# Patient Record
Sex: FEMALE | Race: WHITE | NOT HISPANIC OR LATINO | Employment: OTHER | URBAN - METROPOLITAN AREA
[De-identification: names, ages, dates, MRNs, and addresses within clinical notes are randomized per-mention and may not be internally consistent; named-entity substitution may affect disease eponyms.]

---

## 2023-04-28 ENCOUNTER — TELEPHONE (OUTPATIENT)
Dept: PRIMARY CARE | Facility: CLINIC | Age: 83
End: 2023-04-28
Payer: MEDICARE

## 2023-04-28 DIAGNOSIS — E03.9 HYPOTHYROIDISM, UNSPECIFIED TYPE: ICD-10-CM

## 2023-04-28 RX ORDER — LEVOTHYROXINE SODIUM 50 UG/1
50 TABLET ORAL DAILY
Qty: 90 TABLET | Refills: 3 | Status: SHIPPED | OUTPATIENT
Start: 2023-04-28 | End: 2024-02-02 | Stop reason: SDUPTHER

## 2023-04-28 RX ORDER — LEVOTHYROXINE SODIUM 50 UG/1
1 TABLET ORAL DAILY
COMMUNITY
Start: 2016-05-16 | End: 2023-04-28 | Stop reason: SDUPTHER

## 2023-04-28 NOTE — TELEPHONE ENCOUNTER
Rx Refill Request Telephone Encounter    Name:  Tania Guerrero  :  [id number]  Medication Name:    Levothyroxine Sodium 50MG Tablet   Specific Pharmacy location:   [facility] - [address]      Date of last appointment: 23   Date of next appointment: 23   Best number to reach patient: [phone]

## 2023-05-23 LAB
RBC, URINE: 9 /HPF (ref 0–5)
SQUAMOUS EPITHELIAL CELLS, URINE: 6 /HPF
WBC, URINE: 3 /HPF (ref 0–5)

## 2023-05-24 LAB — URINE CULTURE: NORMAL

## 2023-06-02 LAB
ALANINE AMINOTRANSFERASE (SGPT) (U/L) IN SER/PLAS: 35 U/L (ref 7–45)
ALBUMIN (G/DL) IN SER/PLAS: 3.9 G/DL (ref 3.4–5)
ALBUMIN (MG/L) IN URINE: 12.8 MG/L
ALBUMIN/CREATININE (UG/MG) IN URINE: 16 UG/MG CRT (ref 0–30)
ALKALINE PHOSPHATASE (U/L) IN SER/PLAS: 74 U/L (ref 33–136)
ANION GAP IN SER/PLAS: 15 MMOL/L (ref 10–20)
ASPARTATE AMINOTRANSFERASE (SGOT) (U/L) IN SER/PLAS: 16 U/L (ref 9–39)
BILIRUBIN TOTAL (MG/DL) IN SER/PLAS: 0.6 MG/DL (ref 0–1.2)
CALCIUM (MG/DL) IN SER/PLAS: 9.3 MG/DL (ref 8.6–10.3)
CARBON DIOXIDE, TOTAL (MMOL/L) IN SER/PLAS: 24 MMOL/L (ref 21–32)
CHLORIDE (MMOL/L) IN SER/PLAS: 104 MMOL/L (ref 98–107)
CHOLESTEROL (MG/DL) IN SER/PLAS: 110 MG/DL (ref 0–199)
CHOLESTEROL IN HDL (MG/DL) IN SER/PLAS: 59.7 MG/DL
CHOLESTEROL IN LDL (MG/DL) IN SER/PLAS BY DIRECT ASSAY: 54 MG/DL (ref 0–129)
CHOLESTEROL/HDL RATIO: 1.8
CREATININE (MG/DL) IN SER/PLAS: 0.99 MG/DL (ref 0.5–1.05)
CREATININE (MG/DL) IN URINE: 79.8 MG/DL (ref 20–320)
ERYTHROCYTE DISTRIBUTION WIDTH (RATIO) BY AUTOMATED COUNT: 12.9 % (ref 11.5–14.5)
ERYTHROCYTE MEAN CORPUSCULAR HEMOGLOBIN CONCENTRATION (G/DL) BY AUTOMATED: 34.2 G/DL (ref 32–36)
ERYTHROCYTE MEAN CORPUSCULAR VOLUME (FL) BY AUTOMATED COUNT: 95 FL (ref 80–100)
ERYTHROCYTES (10*6/UL) IN BLOOD BY AUTOMATED COUNT: 4.58 X10E12/L (ref 4–5.2)
ESTIMATED AVERAGE GLUCOSE FOR HBA1C: 140 MG/DL
GFR FEMALE: 57 ML/MIN/1.73M2
GLUCOSE (MG/DL) IN SER/PLAS: 112 MG/DL (ref 74–99)
HEMATOCRIT (%) IN BLOOD BY AUTOMATED COUNT: 43.3 % (ref 36–46)
HEMOGLOBIN (G/DL) IN BLOOD: 14.8 G/DL (ref 12–16)
HEMOGLOBIN A1C/HEMOGLOBIN TOTAL IN BLOOD: 6.5 %
LDL: 31 MG/DL (ref 0–99)
LEUKOCYTES (10*3/UL) IN BLOOD BY AUTOMATED COUNT: 7.8 X10E9/L (ref 4.4–11.3)
PLATELETS (10*3/UL) IN BLOOD AUTOMATED COUNT: 317 X10E9/L (ref 150–450)
POTASSIUM (MMOL/L) IN SER/PLAS: 3.5 MMOL/L (ref 3.5–5.3)
PROTEIN TOTAL: 6.8 G/DL (ref 6.4–8.2)
SODIUM (MMOL/L) IN SER/PLAS: 139 MMOL/L (ref 136–145)
THYROTROPIN (MIU/L) IN SER/PLAS BY DETECTION LIMIT <= 0.05 MIU/L: 1.34 MIU/L (ref 0.44–3.98)
TRIGLYCERIDE (MG/DL) IN SER/PLAS: 95 MG/DL (ref 0–149)
UREA NITROGEN (MG/DL) IN SER/PLAS: 19 MG/DL (ref 6–23)
VLDL: 19 MG/DL (ref 0–40)

## 2023-06-08 PROBLEM — I25.10 ASHD (ARTERIOSCLEROTIC HEART DISEASE): Status: ACTIVE | Noted: 2023-06-08

## 2023-06-08 PROBLEM — N18.31 CHRONIC KIDNEY DISEASE, STAGE 3A (MULTI): Status: ACTIVE | Noted: 2023-06-08

## 2023-06-08 PROBLEM — N28.9 LESION OF LEFT NATIVE URETER: Status: RESOLVED | Noted: 2023-06-08 | Resolved: 2023-06-08

## 2023-06-08 PROBLEM — Z00.00 MEDICARE ANNUAL WELLNESS VISIT, SUBSEQUENT: Status: ACTIVE | Noted: 2023-06-08

## 2023-06-08 PROBLEM — H40.053 BILATERAL OCULAR HYPERTENSION: Status: ACTIVE | Noted: 2023-06-08

## 2023-06-08 PROBLEM — E78.5 HYPERLIPIDEMIA: Status: ACTIVE | Noted: 2023-06-08

## 2023-06-08 PROBLEM — I51.9 LEFT VENTRICULAR DYSFUNCTION: Status: ACTIVE | Noted: 2023-06-08

## 2023-06-08 PROBLEM — E66.9 OBESITY: Status: ACTIVE | Noted: 2023-06-08

## 2023-06-08 PROBLEM — J98.4 MIXED RESTRICTIVE AND OBSTRUCTIVE LUNG DISEASE (MULTI): Status: ACTIVE | Noted: 2023-06-08

## 2023-06-08 PROBLEM — E87.6 HYPOKALEMIA: Status: ACTIVE | Noted: 2023-06-08

## 2023-06-08 PROBLEM — N39.3 STRESS INCONTINENCE, FEMALE: Status: ACTIVE | Noted: 2023-06-08

## 2023-06-08 PROBLEM — G56.03 CARPAL TUNNEL SYNDROME, BILATERAL UPPER LIMBS: Status: ACTIVE | Noted: 2023-06-08

## 2023-06-08 PROBLEM — I10 HYPERTENSION: Status: ACTIVE | Noted: 2023-06-08

## 2023-06-08 PROBLEM — I25.2 HISTORY OF MI (MYOCARDIAL INFARCTION): Status: ACTIVE | Noted: 2023-06-08

## 2023-06-08 PROBLEM — J44.9 MIXED RESTRICTIVE AND OBSTRUCTIVE LUNG DISEASE: Status: ACTIVE | Noted: 2023-06-08

## 2023-06-08 PROBLEM — H26.9 CATARACT, LEFT EYE: Status: ACTIVE | Noted: 2023-06-08

## 2023-06-08 PROBLEM — J44.9 COPD (CHRONIC OBSTRUCTIVE PULMONARY DISEASE) (MULTI): Status: ACTIVE | Noted: 2023-06-08

## 2023-06-08 PROBLEM — M17.0 PRIMARY OSTEOARTHRITIS OF BOTH KNEES: Status: ACTIVE | Noted: 2023-06-08

## 2023-06-08 PROBLEM — G45.8 SUBCLAVIAN STEAL SYNDROME: Status: ACTIVE | Noted: 2023-06-08

## 2023-06-08 PROBLEM — R60.0 BILATERAL LEG EDEMA: Status: ACTIVE | Noted: 2023-06-08

## 2023-06-08 PROBLEM — J43.9 MIXED RESTRICTIVE AND OBSTRUCTIVE LUNG DISEASE (MULTI): Status: ACTIVE | Noted: 2023-06-08

## 2023-06-08 PROBLEM — E87.6 HYPOKALEMIA: Status: RESOLVED | Noted: 2023-06-08 | Resolved: 2023-06-08

## 2023-06-08 PROBLEM — Q61.5 MEDULLARY SPONGE KIDNEY: Status: ACTIVE | Noted: 2023-06-08

## 2023-06-08 PROBLEM — E66.9 OBESITY: Status: RESOLVED | Noted: 2023-06-08 | Resolved: 2023-06-08

## 2023-06-08 PROBLEM — N13.30 HYDRONEPHROSIS, LEFT: Status: RESOLVED | Noted: 2023-06-08 | Resolved: 2023-06-08

## 2023-06-08 PROBLEM — N81.9 PROLAPSE OF FEMALE PELVIC ORGANS: Status: ACTIVE | Noted: 2023-06-08

## 2023-06-08 PROBLEM — D18.03 HEPATIC HEMANGIOMA: Status: ACTIVE | Noted: 2023-06-08

## 2023-06-08 PROBLEM — E11.9 DIET-CONTROLLED TYPE 2 DIABETES MELLITUS (MULTI): Status: ACTIVE | Noted: 2023-06-08

## 2023-06-08 PROBLEM — E04.1 THYROID NODULE: Status: ACTIVE | Noted: 2023-06-08

## 2023-06-08 PROBLEM — M81.0 OSTEOPOROSIS: Status: ACTIVE | Noted: 2023-06-08

## 2023-06-08 RX ORDER — BUDESONIDE AND FORMOTEROL FUMARATE DIHYDRATE 160; 4.5 UG/1; UG/1
2 AEROSOL RESPIRATORY (INHALATION) 2 TIMES DAILY
COMMUNITY
Start: 2022-02-18 | End: 2024-02-02 | Stop reason: CLARIF

## 2023-06-08 RX ORDER — ALBUTEROL SULFATE 90 UG/1
1 AEROSOL, METERED RESPIRATORY (INHALATION) EVERY 4 HOURS PRN
COMMUNITY
Start: 2016-10-20

## 2023-06-08 RX ORDER — AMLODIPINE BESYLATE 2.5 MG/1
1 TABLET ORAL DAILY
COMMUNITY
Start: 2022-11-18 | End: 2023-10-27 | Stop reason: SDUPTHER

## 2023-06-08 RX ORDER — PHENYLEPHRINE HCL 10 MG
500 TABLET ORAL DAILY
COMMUNITY

## 2023-06-08 RX ORDER — LATANOPROST 50 UG/ML
1 SOLUTION/ DROPS OPHTHALMIC NIGHTLY
COMMUNITY
Start: 2019-05-11

## 2023-06-08 RX ORDER — METOPROLOL SUCCINATE 100 MG/1
1 TABLET, EXTENDED RELEASE ORAL DAILY
COMMUNITY
Start: 2016-05-16 | End: 2023-11-27 | Stop reason: SDUPTHER

## 2023-06-08 RX ORDER — ASPIRIN 81 MG/1
1 TABLET ORAL DAILY
COMMUNITY
Start: 2016-05-16

## 2023-06-08 RX ORDER — ISOSORBIDE MONONITRATE 30 MG/1
1 TABLET, EXTENDED RELEASE ORAL DAILY
COMMUNITY
Start: 2016-05-16 | End: 2023-06-12 | Stop reason: WASHOUT

## 2023-06-08 RX ORDER — LOSARTAN POTASSIUM AND HYDROCHLOROTHIAZIDE 25; 100 MG/1; MG/1
1 TABLET ORAL DAILY
COMMUNITY
Start: 2017-12-14 | End: 2023-11-27 | Stop reason: SDUPTHER

## 2023-06-08 RX ORDER — SIMVASTATIN 10 MG/1
5 TABLET, FILM COATED ORAL NIGHTLY
COMMUNITY
Start: 2016-05-16 | End: 2023-11-27 | Stop reason: SDUPTHER

## 2023-06-08 RX ORDER — GLUCOSAMINE HCL 500 MG
1 TABLET ORAL DAILY
COMMUNITY
Start: 2016-05-16

## 2023-06-08 RX ORDER — ACETAMINOPHEN 500 MG
1 TABLET ORAL DAILY
COMMUNITY

## 2023-06-08 NOTE — PROGRESS NOTES
Subjective :  Chief Complaint: Tania Guerrero is an 83 y.o. female here for an annual Medicare Wellness visit, annual physical, and follow up labs and chronic conditions.      Patient feels well. No other complaints or concerns.    I have reviewed and reconciled the medication list with the patient today.    Current Outpatient Medications:     albuterol (Ventolin HFA) 90 mcg/actuation inhaler, Inhale 1 puff every 4 hours if needed for wheezing or shortness of breath., Disp: , Rfl:     amLODIPine (Norvasc) 2.5 mg tablet, Take 1 tablet (2.5 mg) by mouth once daily., Disp: , Rfl:     aspirin 81 mg EC tablet, Take 1 tablet (81 mg) by mouth once daily., Disp: , Rfl:     cholecalciferol (Vitamin D-3) 50 mcg (2,000 unit) capsule, Take by mouth., Disp: , Rfl:     Cinnamon 500 mg capsule, Take by mouth., Disp: , Rfl:     fish oil concentrate (Omega-3) 120-180 mg capsule, Take 1 capsule (1 g) by mouth once daily., Disp: , Rfl:     latanoprost (Xalatan) 0.005 % ophthalmic solution, Administer into affected eye(s)., Disp: , Rfl:     levothyroxine (Synthroid, Levoxyl) 50 mcg tablet, Take 1 tablet (50 mcg) by mouth once daily., Disp: 90 tablet, Rfl: 3    losartan-hydrochlorothiazide (Hyzaar) 100-25 mg tablet, Take 1 tablet by mouth once daily., Disp: , Rfl:     metoprolol succinate XL (Toprol-XL) 100 mg 24 hr tablet, Take 1 tablet (100 mg) by mouth once daily., Disp: , Rfl:     simvastatin (Zocor) 10 mg tablet, Take 0.5 tablets (5 mg) by mouth once daily at bedtime., Disp: , Rfl:     Symbicort 160-4.5 mcg/actuation inhaler, Inhale 2 puffs 2 times a day., Disp: , Rfl:     ubidecarenone (coenzyme Q10) 100 mg tablet, Take by mouth., Disp: , Rfl:     The patient's relevant past medical, surgical, family and social history was reviewed in [facility].  All pertinent lab work and results for this visit were reviewed with patient.    Recent Results (from the past 1008 hour(s))   Urine Culture    Collection Time: 05/23/23  4:18 PM    Specimen:  Urine    Unspecified   Result Value Ref Range    Urine Culture NO SIGNIFICANT GROWTH.    Urinalysis Microscopic Only    Collection Time: 05/23/23  4:18 PM   Result Value Ref Range    WBC, Urine 3 0 - 5 /HPF    RBC, Urine 9 (A) 0 - 5 /HPF    Squamous Epithelial Cells, Urine 6 /HPF   CBC    Collection Time: 06/02/23  8:02 AM   Result Value Ref Range    WBC 7.8 4.4 - 11.3 x10E9/L    RBC 4.58 4.00 - 5.20 x10E12/L    Hemoglobin 14.8 12.0 - 16.0 g/dL    Hematocrit 43.3 36.0 - 46.0 %    MCV 95 80 - 100 fL    MCHC 34.2 32.0 - 36.0 g/dL    Platelets 317 150 - 450 x10E9/L    RDW 12.9 11.5 - 14.5 %   Albumin , Urine Random    Collection Time: 06/02/23  8:02 AM   Result Value Ref Range    ALBUMIN (MG/L) IN URINE 12.8 Not Established mg/L    Albumin/Creatine Ratio 16.0 0.0 - 30.0 ug/mg crt    Creatinine, Urine 79.8 20.0 - 320.0 mg/dL   Comprehensive Metabolic Panel    Collection Time: 06/02/23  8:02 AM   Result Value Ref Range    Glucose 112 (H) 74 - 99 mg/dL    Sodium 139 136 - 145 mmol/L    Potassium 3.5 3.5 - 5.3 mmol/L    Chloride 104 98 - 107 mmol/L    Bicarbonate 24 21 - 32 mmol/L    Anion Gap 15 10 - 20 mmol/L    Urea Nitrogen 19 6 - 23 mg/dL    Creatinine 0.99 0.50 - 1.05 mg/dL    GFR Female 57 (A) >90 mL/min/1.73m2    Calcium 9.3 8.6 - 10.3 mg/dL    Albumin 3.9 3.4 - 5.0 g/dL    Alkaline Phosphatase 74 33 - 136 U/L    Total Protein 6.8 6.4 - 8.2 g/dL    AST 16 9 - 39 U/L    Total Bilirubin 0.6 0.0 - 1.2 mg/dL    ALT (SGPT) 35 7 - 45 U/L   Hemoglobin A1C    Collection Time: 06/02/23  8:02 AM   Result Value Ref Range    Hemoglobin A1C 6.5 (A) %    Estimated Average Glucose 140 MG/DL   Cholesterol, LDL Direct    Collection Time: 06/02/23  8:02 AM   Result Value Ref Range    LDL Direct 54 0 - 129 mg/dL   Lipid Panel    Collection Time: 06/02/23  8:02 AM   Result Value Ref Range    Cholesterol 110 0 - 199 mg/dL    HDL 59.7 mg/dL    Cholesterol/HDL Ratio 1.8     LDL 31 0 - 99 mg/dL    VLDL 19 0 - 40 mg/dL    Triglycerides 95  "0 - 149 mg/dL   TSH with reflex to Free T4 if abnormal    Collection Time: 06/02/23  8:02 AM   Result Value Ref Range    TSH 1.34 0.44 - 3.98 mIU/L         Review of Systems   A complete review of systems was performed and all systems were normal except what is noted in the HPI.      List of current healthcare providers:  Patient Care Team:  Sue Murray MD as PCP - General  Sue Murray MD as PCP - [employer] PCP        Over the past 2 weeks, how often have you been bothered by any of the following problems?  Little interest or pleasure in doing things: Not at all  Feeling down, depressed, or hopeless: Not at all  Patient Health Questionnaire-2 Score: 0             Advance Care Planning:    Living Will: Yes  POA: Yes    Objective :  /83   Pulse 75   Temp 36.5 °C (97.7 °F)   Ht 1.575 m (5' 2\")   Wt 78.3 kg (172 lb 9.6 oz)   SpO2 95%   BMI 31.57 kg/m²    No results found.  Physical Exam  Constitutional:       Appearance: Normal appearance. She is obese.   HENT:      Head: Normocephalic and atraumatic.   Neck:      Vascular: No carotid bruit.   Cardiovascular:      Rate and Rhythm: Normal rate and regular rhythm.      Heart sounds: Normal heart sounds.   Pulmonary:      Effort: Pulmonary effort is normal.      Breath sounds: Normal breath sounds. No wheezing, rhonchi or rales.   Abdominal:      General: Abdomen is flat. Bowel sounds are normal.      Palpations: Abdomen is soft.      Tenderness: There is no abdominal tenderness. There is no guarding.   Musculoskeletal:         General: Normal range of motion.      Right lower leg: No edema.      Left lower leg: No edema.   Skin:     General: Skin is dry.   Neurological:      General: No focal deficit present.      Mental Status: She is alert and oriented to person, place, and time.   Psychiatric:         Mood and Affect: Mood normal.         Behavior: Behavior normal.         Thought Content: Thought content normal. "         Assessment/Plan :  Problem List Items Addressed This Visit       Hypothyroid     Well controlled continue current medication. Reevaluate in 6 months.            Relevant Orders    TSH with reflex to Free T4 if abnormal    Chronic kidney disease, stage 3a     Stable, no proteinuria  Continue to monitor   Recheck 6 months         Relevant Orders    Comprehensive Metabolic Panel    Diet-controlled type 2 diabetes mellitus (CMS/HCC)     well controlled continue work on diet  Reevaluate in 6 months.           Relevant Orders    Comprehensive Metabolic Panel    Hemoglobin A1c    Hyperlipidemia     Well controlled continue current medication. Reevaluate in 6 months.          Relevant Orders    Cholesterol, LDL Direct    Lipid Panel    Comprehensive Metabolic Panel    Hypertension     Well controlled continue current medication. Reevaluate in 6 months.          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  COVID vaccination and shingrix recommended   Prevnar 13 3/16  Pneumovax 23 10/14  Tetanus 10/14  Mammogram 2/22  DEXA 2/22          Other Visit Diagnoses       Annual physical exam        Yearly physical done               The following health maintenance schedule was reviewed with the patient and provided in printed form in the after visit summary:  Health Maintenance   Topic Date Due    COVID-19 Vaccine (1) Never done    Diabetes: Foot Exam  Never done    Diabetes: Retinopathy Screening  Never done    Hepatitis A Vaccines (1 of 2 - Risk 2-dose series) Never done    Hepatitis B Vaccines (1 of 3 - Risk 3-dose series) Never done    Zoster Vaccines (2 of 3) 12/13/2018    Diabetes: Hemoglobin A1C  09/02/2023    Bone Density Scan  02/02/2024    TSH Level  06/02/2024    Lipid Panel  06/02/2024    Diabetes:  Urine Protein Screening  06/02/2024    Medicare Annual Wellness Visit (AWV)  06/13/2024    DTaP/Tdap/Td Vaccines (2 - Td or Tdap) 10/23/2024    Influenza Vaccine  Completed    Pneumococcal Vaccine: 65+ Years  Completed    HIB Vaccines  Aged Out    IPV Vaccines  Aged Out    Meningococcal Vaccine  Aged Out    Rotavirus Vaccines  Aged Out    HPV Vaccines  Aged Out           Patient understands and agrees with treatment plan.          Sue Murray MD

## 2023-06-08 NOTE — ASSESSMENT & PLAN NOTE
Medical Wellness exam done.  COVID vaccination and shingrix recommended   Prevnar 13 3/16  Pneumovax 23 10/14  Tetanus 10/14  Mammogram 2/22  DEXA 2/22

## 2023-06-08 NOTE — ASSESSMENT & PLAN NOTE
>>ASSESSMENT AND PLAN FOR MIXED RESTRICTIVE AND OBSTRUCTIVE LUNG DISEASE (CMS/HCC) WRITTEN ON 6/8/2023  8:30 AM BY EULALIO PICKETT MD    Stable   Continue current medication  Recheck 6 months

## 2023-06-08 NOTE — PATIENT INSTRUCTIONS
I would like you to follow up in 6 months  Please have all labs that were ordered done at least 1 week prior to your visit.      Recommend healthy diet based around fruits, vegetables, and lean proteins such as chicken, turkey, fish, and beans.  Also include moderate portions of healthy carbohydrates such as wheat bread and pasta, sweet potatoes. Limit sweets and alcoholic beverages. Try not drink more than 100 calories in beverages daily.   It is important to get a protein-rich breakfast daily such as oatmeal, eggs or Greek yogurt.  Increase activity as able to a recommended goal of at least 30 minutes of cardiovascular exercise (walking, swimming, biking, jogging etc.) at least 5 days weekly and a goal of 45 minutes or more most days of the week for weight loss. This exercise can be done all at one time or broken up into 2 or more sessions throughout the day.

## 2023-06-12 ENCOUNTER — OFFICE VISIT (OUTPATIENT)
Dept: PRIMARY CARE | Facility: CLINIC | Age: 83
End: 2023-06-12
Payer: MEDICARE

## 2023-06-12 VITALS
TEMPERATURE: 97.7 F | SYSTOLIC BLOOD PRESSURE: 124 MMHG | WEIGHT: 172.6 LBS | HEIGHT: 62 IN | HEART RATE: 75 BPM | OXYGEN SATURATION: 95 % | DIASTOLIC BLOOD PRESSURE: 83 MMHG | BODY MASS INDEX: 31.76 KG/M2

## 2023-06-12 DIAGNOSIS — J43.9 MIXED RESTRICTIVE AND OBSTRUCTIVE LUNG DISEASE (MULTI): ICD-10-CM

## 2023-06-12 DIAGNOSIS — E11.9 DIET-CONTROLLED TYPE 2 DIABETES MELLITUS (MULTI): ICD-10-CM

## 2023-06-12 DIAGNOSIS — Z00.00 MEDICARE ANNUAL WELLNESS VISIT, SUBSEQUENT: Primary | ICD-10-CM

## 2023-06-12 DIAGNOSIS — I10 PRIMARY HYPERTENSION: ICD-10-CM

## 2023-06-12 DIAGNOSIS — E78.2 MIXED HYPERLIPIDEMIA: ICD-10-CM

## 2023-06-12 DIAGNOSIS — N18.31 CHRONIC KIDNEY DISEASE, STAGE 3A (MULTI): ICD-10-CM

## 2023-06-12 DIAGNOSIS — J98.4 MIXED RESTRICTIVE AND OBSTRUCTIVE LUNG DISEASE (MULTI): ICD-10-CM

## 2023-06-12 DIAGNOSIS — E03.9 HYPOTHYROIDISM, UNSPECIFIED TYPE: ICD-10-CM

## 2023-06-12 DIAGNOSIS — Z00.00 ANNUAL PHYSICAL EXAM: ICD-10-CM

## 2023-06-12 PROCEDURE — 1170F FXNL STATUS ASSESSED: CPT | Performed by: FAMILY MEDICINE

## 2023-06-12 PROCEDURE — 1036F TOBACCO NON-USER: CPT | Performed by: FAMILY MEDICINE

## 2023-06-12 PROCEDURE — 99397 PER PM REEVAL EST PAT 65+ YR: CPT | Performed by: FAMILY MEDICINE

## 2023-06-12 PROCEDURE — 1159F MED LIST DOCD IN RCRD: CPT | Performed by: FAMILY MEDICINE

## 2023-06-12 PROCEDURE — 3074F SYST BP LT 130 MM HG: CPT | Performed by: FAMILY MEDICINE

## 2023-06-12 PROCEDURE — 3079F DIAST BP 80-89 MM HG: CPT | Performed by: FAMILY MEDICINE

## 2023-06-12 PROCEDURE — G0439 PPPS, SUBSEQ VISIT: HCPCS | Performed by: FAMILY MEDICINE

## 2023-06-12 PROCEDURE — 99214 OFFICE O/P EST MOD 30 MIN: CPT | Performed by: FAMILY MEDICINE

## 2023-06-12 PROCEDURE — 1160F RVW MEDS BY RX/DR IN RCRD: CPT | Performed by: FAMILY MEDICINE

## 2023-06-12 ASSESSMENT — ACTIVITIES OF DAILY LIVING (ADL)
TAKING_MEDICATION: INDEPENDENT
MANAGING_FINANCES: INDEPENDENT
DOING_HOUSEWORK: INDEPENDENT
DRESSING: INDEPENDENT
BATHING: INDEPENDENT
GROCERY_SHOPPING: INDEPENDENT

## 2023-06-12 ASSESSMENT — PATIENT HEALTH QUESTIONNAIRE - PHQ9
2. FEELING DOWN, DEPRESSED OR HOPELESS: NOT AT ALL
SUM OF ALL RESPONSES TO PHQ9 QUESTIONS 1 AND 2: 0
1. LITTLE INTEREST OR PLEASURE IN DOING THINGS: NOT AT ALL

## 2023-06-12 ASSESSMENT — ENCOUNTER SYMPTOMS
LOSS OF SENSATION IN FEET: 0
DEPRESSION: 0
OCCASIONAL FEELINGS OF UNSTEADINESS: 0

## 2023-09-22 ENCOUNTER — HOSPITAL ENCOUNTER (OUTPATIENT)
Dept: DATA CONVERSION | Facility: HOSPITAL | Age: 83
End: 2023-09-22
Attending: STUDENT IN AN ORGANIZED HEALTH CARE EDUCATION/TRAINING PROGRAM | Admitting: STUDENT IN AN ORGANIZED HEALTH CARE EDUCATION/TRAINING PROGRAM
Payer: MEDICARE

## 2023-09-22 DIAGNOSIS — N81.3 COMPLETE UTEROVAGINAL PROLAPSE: ICD-10-CM

## 2023-09-22 DIAGNOSIS — N39.3 STRESS INCONTINENCE (FEMALE) (MALE): ICD-10-CM

## 2023-09-22 DIAGNOSIS — N81.9 FEMALE GENITAL PROLAPSE, UNSPECIFIED: ICD-10-CM

## 2023-09-26 LAB — URINE CULTURE: NO GROWTH

## 2023-09-29 VITALS — BODY MASS INDEX: 31.64 KG/M2 | HEIGHT: 62 IN | WEIGHT: 171.96 LBS

## 2023-09-30 NOTE — H&P
History of Present Illness:   History Present Illness:  Reason for surgery: POP   HPI:    Ms. Guerrero is an 82yo with stage III POP who desires surgical management.  She presents for LeFort colpocleisis.    UDS: negative for leak, no sling  PMHx notable for: HTN, DM, CDK (baseline Cr 1.1), and MI    POP-Q: Stage: 3. Aa: 0. Ba: 0 C: +1 Gh: 3. Pb: 3 TVL: 8 Ap: 0. Bp: 0. D: +1.     Allergies:        Allergies:  ·  penicillin : Unknown  ·  Levaquin : Unknown  ·  sulfa  drugs: Unknown    Home Medication Review:   Home Medications Reviewed: no     Impression/Procedure:   ·  Impression and Planned Procedure: LeFort Colpocleisis, cystoscopy       ERAS (Enhanced Recovery After Surgery):  ·  ERAS Patient: no       Physical Exam by System:    Constitutional: A&Ox3   Respiratory/Thorax: No increased work of breathing,  comfortable on RA   Cardiovascular: Regular rate and rhythm   Gastrointestinal: Soft, non-tender   Genitourinary: Per HPI   Musculoskeletal: Normal ROM   Neurological: No acute deficits   Skin: No lesions     Consent:   COVID-19 Consent:  ·  COVID-19 Risk Consent Surgeon has reviewed key risks related to the risk of nasreen COVID-19 and if they contract COVID-19 what the risks are.     Attestation:   Note Completion:  I am a:  Resident/Fellow   Attending Attestation I saw and evaluated the patient.  I personally obtained the key and critical portions of the history and physical exam or was physically present for key and  critical portions performed by the resident/fellow. I reviewed the resident/fellow?s documentation and discussed the patient with the resident/fellow.  I agree with the resident/fellow?s medical decision making as documented in the note.     I personally evaluated the patient on 22-Sep-2023         Electronic Signatures:  Lorraine Joyner (Fellow))  (Signed 21-Sep-2023 16:22)   Authored: History of Present Illness, Allergies, Home  Medication Review, Impression/Procedure, ERAS, Physical  Exam, Consent, Note Completion  Reginald Dangelo)  (Signed 22-Sep-2023 07:45)   Authored: Note Completion   Co-Signer: History of Present Illness, Allergies, Home Medication Review, Impression/Procedure, ERAS, Physical Exam, Consent, Note Completion      Last Updated: 22-Sep-2023 07:45 by Reginald Dangelo)

## 2023-10-01 NOTE — OP NOTE
Post Operative Note:     PreOp Diagnosis: genital prolapse   Post-Procedure Diagnosis: same   Procedure: 1. LeFort Colpocleisis  2. Posterior repair   Surgeon: rios   Resident/Fellow/Other Assistant: Ar   Anesthesia: ga   Estimated Blood Loss (mL): 10   Specimen: no   Findings: stage 3 uterovaginal prolapse, normal bladder     Operative Report Dictated:  Dictation: not applicable - note contains Operative  Report   Operative Report:      Patient was taken to the operating room. She was placed under general anesthesia and given 2 g of ancef. She was then placed in dorsal lithotomy, prepped and draped in the usual sterile fashion. An EUA was performed and revealed the findings above. We  then began the procedure by grasping the anterior and posterior lips of the cervix with a single tooth tenaculum x 2. We then scored the anterior and posterior vaginal mucosa with the Bovie to kieran the areas of our dissection leaving to 1.5 cm strips  of mucosa laterally for the tunnels. Then we injected 10 units of vasopressin in 30 ml of NS anteriorly and posterioly to hydrodisect the mucosa off the vagina. We then completed the dissection of the vaginal mucosa with the bovie and sharply with metzenbaum  scissors. Then using 0-vicryl suture we incorporated the vaginal remaining vaginal mucosa to create lateral tunnels for cervical and uterine drainage with single interrupted sutures. This was done bilaterally. Then we created the cervical tunnel incorporating  the anterior and posterior lips of the cervix using the same suture. Then using the same suture we imbricated the uterus and vagina to reduce the prolapse with 3 sequential purse string stitches. Once this was completed we closed the anterior and posterior  vaginal mucosa to complete the procedure with a resulting vaginal length of approximately 3 cm. Once this was completed, the tate was removed, we performed a cystoscopy, the bladder was normal, both UOs and jets  were seen. The cystoscope was removed  and the tate was replaced.    We then turned our attention the the levator myorrhaphy. A wide daniel incision was made in the distal vagina and perineum and the excess epithelium was dissected and discarded after first injecting the area with 1% lidocaine with epinephrine. The perineal  muscles were then dissected off of the epithelium with madrid scissors. The muscles were then plicated using vertical mattress sutures until the genital hiatus was 2 cm wide. We then re-approximated the epithelium with a 0-vicryl stitch.       Electronic Signatures:  Reginald Dangelo)  (Signed 22-Sep-2023 09:13)   Authored: Post Operative Note, Note Completion      Last Updated: 22-Sep-2023 09:13 by Reginald Dangelo)

## 2023-10-27 DIAGNOSIS — I10 PRIMARY HYPERTENSION: Primary | ICD-10-CM

## 2023-10-27 RX ORDER — AMLODIPINE BESYLATE 2.5 MG/1
2.5 TABLET ORAL DAILY
Qty: 90 TABLET | Refills: 3 | Status: SHIPPED | OUTPATIENT
Start: 2023-10-27

## 2023-11-06 PROBLEM — R20.2 PARESTHESIA OF BOTH HANDS: Status: ACTIVE | Noted: 2023-11-06

## 2023-11-06 PROBLEM — J44.9 COPD (CHRONIC OBSTRUCTIVE PULMONARY DISEASE) (MULTI): Status: ACTIVE | Noted: 2023-11-06

## 2023-11-06 RX ORDER — ISOSORBIDE MONONITRATE 30 MG/1
1 TABLET, EXTENDED RELEASE ORAL DAILY
COMMUNITY
Start: 2016-05-16 | End: 2024-02-02 | Stop reason: WASHOUT

## 2023-11-07 ENCOUNTER — TELEMEDICINE (OUTPATIENT)
Dept: UROLOGY | Facility: CLINIC | Age: 83
End: 2023-11-07
Payer: MEDICARE

## 2023-11-07 DIAGNOSIS — R10.2 PELVIC PAIN: ICD-10-CM

## 2023-11-07 DIAGNOSIS — M62.89 PELVIC FLOOR DYSFUNCTION: Primary | ICD-10-CM

## 2023-11-07 DIAGNOSIS — Z09 POSTOP CHECK: ICD-10-CM

## 2023-11-07 PROCEDURE — 99024 POSTOP FOLLOW-UP VISIT: CPT | Performed by: STUDENT IN AN ORGANIZED HEALTH CARE EDUCATION/TRAINING PROGRAM

## 2023-11-07 RX ORDER — ESTRADIOL 0.1 MG/G
CREAM VAGINAL
Qty: 42.5 G | Refills: 12 | Status: SHIPPED | OUTPATIENT
Start: 2023-11-07 | End: 2024-02-02 | Stop reason: WASHOUT

## 2023-11-07 NOTE — PROGRESS NOTES
HISTORY OF PRESENT ILLNESS:  This is a 83 y.o. female, who presents with a virtual 6 weeks follow up visit. Doing well post-op, c/o small yellow spot on pad, unsure if it's urine or discharge.              HISTORY OF PRESENT ILLNESS:           Past Medical History  She has a past medical history of Disorder of kidney and ureter, unspecified (06/03/2021), Diverticulitis of intestine, part unspecified, without perforation or abscess without bleeding, Hydronephrosis, left (06/08/2023), Medullary cystic kidney, Old myocardial infarction, Other conditions influencing health status, Personal history of other diseases of the respiratory system (06/24/2019), Personal history of other infectious and parasitic diseases, Personal history of other specified conditions (06/11/2018), Personal history of urinary calculi, and Unspecified hydronephrosis (05/20/2021).    Surgical History  She has a past surgical history that includes Lithotripsy (05/16/2016); Other surgical history (06/24/2019); Other surgical history (12/02/2019); Other surgical history (12/02/2019); and Other surgical history (12/02/2019).     Social History  She reports that she has never smoked. She has never used smokeless tobacco. She reports that she does not currently use alcohol. She reports that she does not use drugs.    Family History  Family History   Problem Relation Name Age of Onset    No Known Problems Father          Allergies  Levofloxacin, Nickel, Penicillins, and Sulfa (sulfonamide antibiotics)      A comprehensive 10+ review of systems was negative except for: see hpi                         Assessment:    84 yo with stage 3 UVP      Prolapse:  S/p LeFort and OR on 9/22/23, doing well  F/up in 3 months   Given vaginal estrogen for possible discharge             Reginald Dangelo MD

## 2023-11-20 RX ORDER — MAGNESIUM HYDROXIDE 400 MG/5ML
1 SUSPENSION, ORAL (FINAL DOSE FORM) ORAL DAILY
COMMUNITY

## 2023-11-20 NOTE — PROGRESS NOTES
[facility]    Patient Name: Tania Guerrero  Patient : 1940      Scribe Attestation  By signing my name below, IJoellen Scribe   attest that this documentation has been prepared under the direction and in the presence of Rebel Nugent DO.      Reason for visit:  This is an 83-year-old female here for follow-up regarding her history of coronary artery disease as seen on cardiac catheterization done in , hypertension, dyslipidemia, COPD, and obesity.     HPI:  This is an 83-year-old female here for follow-up regarding her history of coronary artery disease as seen on cardiac catheterization done in , hypertension, dyslipidemia, COPD, and obesity.  The patient was last evaluated by me in 2022.  At that visit I started her on amlodipine 2.5 mg daily and asked that she follow-up in 1 year.  CMP done in 2023 showed normal serum sodium and potassium with a serum creatinine 0.97, normal ALT/AST, CBC showed a hemoglobin of 13.8.  Lipid panel done in 2023 showed an LDL cholesterol of 31 and triglycerides of 95 while on simvastatin 10 mg daily.  CT scan of the abdomen/pelvis done in 2023 showed no acute findings with unchanged nonobstructing left inferior pole renal calculi. ECG done today showed sinus rhythm with a heart rate of 73 bpm.  The patient reports that she has been feeling generally well from the cardiac standpoint. She denies any new chest pain, shortness of breath, palpitations and lightheadedness, although she states she has occasional lower extremity edema and wears support stockings. She states that she takes all of her medications as prescribed. During my exam, she was resting comfortably on the exam table.         Assessment/Plan:   1. Coronary artery disease  The patient has a history of history of mild coronary artery disease as seen on cardiac catheterization done in .  ECG done today showed sinus  rhythm with a heart rate of 73 bpm.    She denies anginal chest discomfort.  Blood pressure appears  controlled on exam today.   She should continue her current antihypertensive medications.  Echocardiogram done in May 2022 showed low normal left ventricular systolic function with an ejection fraction of 50 to 55%, low normal right ventricular systolic function, no significant valve abnormalities.   Lipid panel done in June 2023 showed an LDL cholesterol of 31 and triglycerides of 95 while on simvastatin 10 mg daily.   Recent lab works as noted in the HPI.   Lab works as noted below will be done in 6 months prior to his next visit.   Please see lifestyle recommendations below.  Follow up in 1 year and sooner if necessary.      2. Hypertension  The patient has a history of hypertension and blood pressure appears controlled on exam today.   She should continue her current antihypertensive medications.  Continue risk factor modification.     3. Dyslipidemia  Lipid panel done in June 2023 showed an LDL cholesterol of 31 and triglycerides of 95 while on simvastatin 10 mg daily.   I will update lipid panel in 6 months.  Please see lifestyle recommendations below.     4. COPD  Stable. Management as per PCP.     5. Obesity  Please see lifestyle recommendations below.    6. Lower extremity edema  The patient has 1+ pitting bilateral lower extremity edema.  I discussed with her the importance of following a low-sodium heart healthy diet as well as weight loss, wearing compression stockings and elevating legs when seated.        Orders:   CMP/lipid/magnesium/CBC in 6 months,   Follow-up in 1 year.      Lifestyle Recommendations  I recommend a whole-food plant-based diet, an eating pattern that encourages the consumption of unrefined plant foods (such as fruits, vegetables, tubers, whole grains, legumes, nuts and seeds) and discourages meats, dairy products, eggs and processed foods.     The AHA/ACC recommends that the patient  consume a dietary pattern that emphasizes intake of vegetables, fruits, and whole grains; includes low-fat dairy products, poultry, fish, legumes, non-tropical vegetable oils, and nuts; and limits intake of sodium, sweets, sugar-sweetened beverages, and red meats.  Adapt this dietary pattern to appropriate calorie requirements (a 500-750 kcal/day deficit to loose weight), personal and cultural food preferences, and nutrition therapy for other medical conditions (including diabetes).  Achieve this pattern by following plans such as the [place] Mediterranean, DASH dietary pattern, or AHA diet.     Engage in 2 hours and 30 minutes per week of moderate-intensity physical activity, or 1 hour and 15 minutes (75 minutes) per week of vigorous-intensity aerobic physical activity, or an equivalent combination of moderate and vigorous-intensity aerobic physical activity. Aerobic activity should be performed in episodes of at least 10 minutes preferably spread throughout the week.     Adhering to a heart healthy diet, regular exercise habits, avoidance of tobacco products, and maintenance of a healthy weight are crucial components of their heart disease risk reduction.     Any positive review of systems not specifically addressed in the office visit today should be evaluated and treated by the patients primary care physician or in an emergency department if necessary     Patient was notified that results from ordered tests will be called to the patient if it changes current management; it will otherwise be discussed at a future appointment and available on [facility].     Thank you for allowing me to participate in the care of this patient.        This document was generated using the assistance of voice recognition software. If there are any errors of spelling, grammar, syntax, or meaning; please feel free to contact me directly for clarification.    Past Medical History:  She has a past medical history of Disorder of kidney and  ureter, unspecified (06/03/2021), Diverticulitis of intestine, part unspecified, without perforation or abscess without bleeding, Hydronephrosis, left (06/08/2023), Medullary cystic kidney, Old myocardial infarction, Other conditions influencing health status, Personal history of other diseases of the respiratory system (06/24/2019), Personal history of other infectious and parasitic diseases, Personal history of other specified conditions (06/11/2018), Personal history of urinary calculi, and Unspecified hydronephrosis (05/20/2021).    Past Surgical History:  She has a past surgical history that includes Lithotripsy (05/16/2016); Other surgical history (06/24/2019); Other surgical history (12/02/2019); Other surgical history (12/02/2019); and Other surgical history (12/02/2019).      Social History:  She reports that she has never smoked. She has never used smokeless tobacco. She reports that she does not currently use alcohol. She reports that she does not use drugs.    Family History:  Family History   Problem Relation Name Age of Onset    No Known Problems Father          Allergies:  Levofloxacin, Nickel, Penicillins, and Sulfa (sulfonamide antibiotics)    Outpatient Medications:  Current Outpatient Medications   Medication Instructions    acetaminophen (Tylenol) 500 mg tablet TAKE 2 TABLETS BY MOUTH EVERY 6 HOURS    albuterol (Ventolin HFA) 90 mcg/actuation inhaler 1 puff, inhalation, Every 4 hours PRN    amLODIPine (NORVASC) 2.5 mg, oral, Daily    aspirin 81 mg EC tablet 1 tablet, oral, Daily    cholecalciferol (Vitamin D-3) 50 mcg (2,000 unit) capsule oral    Cinnamon 500 mg capsule oral    estradiol (Estrace) 0.01 % (0.1 mg/gram) vaginal cream Insert pea size amount into vagina every night for 2 weeks, then 2x/week after    fish oil concentrate (Omega-3) 120-180 mg capsule 1 capsule, oral, Daily    TEO OIL ORAL 1 capsule, oral, Daily    isosorbide mononitrate ER (Imdur) 30 mg 24 hr tablet 1 tablet, oral,  "Daily    ketorolac (Toradol) 10 mg tablet TAKE 1 TABLET BY MOUTH FOUR TIMES A DAY    latanoprost (Xalatan) 0.005 % ophthalmic solution ophthalmic (eye)    levothyroxine (SYNTHROID, LEVOXYL) 50 mcg, oral, Daily    losartan-hydrochlorothiazide (Hyzaar) 100-25 mg tablet 1 tablet, oral, Daily    metoprolol succinate XL (Toprol-XL) 100 mg 24 hr tablet 1 tablet, oral, Daily    mometasone-formoterol (Dulera 200) 200-5 mcg/actuation inhaler 1 puff, inhalation    potassium gluconate 595 mg (99 mg) tablet oral    simvastatin (ZOCOR) 5 mg, oral, Nightly    Symbicort 160-4.5 mcg/actuation inhaler 2 puffs, inhalation, 2 times daily    traMADol (Ultram) 50 mg tablet TAKE 1 TABLET BY MOUTH FOUR TIMES A DAY    ubidecarenone (coenzyme Q10) 100 mg tablet oral        ROS:  A 14 point review of systems was done and is negative other than as stated in HPI    Vitals:      5/11/2022     1:01 PM 6/7/2022    12:04 PM 11/18/2022    10:50 AM 12/16/2022    10:47 AM 5/23/2023     3:34 PM 6/12/2023    11:12 AM 9/22/2023     6:14 AM   Vitals   Systolic 140 137 162 122 129 124    Diastolic 74 79 102 81 78 83    Heart Rate 76 85 75 77 77 75    Temp  36.9 °C (98.4 °F)  36.6 °C (97.9 °F)  36.5 °C (97.7 °F)    Resp   17       Height (in) 1.575 m (5' 2\") 1.575 m (5' 2\") 1.575 m (5' 2\") 1.575 m (5' 2\")  1.575 m (5' 2\") 1.574 m (5' 1.97\")   Weight (lb) 174.38 174.2 175 174.4  172.6 171.96   BMI 31.89 kg/m2 31.86 kg/m2 32.01 kg/m2 31.9 kg/m2  31.57 kg/m2 31.48 kg/m2   BSA (m2) 1.86 m2 1.86 m2 1.86 m2 1.86 m2  1.85 m2 1.85 m2        Physical Exam:   Constitutional: Cooperative, in no acute distress, alert, appears stated age.   Skin: Skin color, texture, turgor normal. No rashes or lesions.   Head: Normocephalic. No masses, lesions, tenderness or abnormalities   Eyes: Extraocular movements are grossly intact.   Mouth and throat: Mucous membranes moist   Neck: Neck supple, no carotid bruits, no JVD   Respiratory: Lungs clear to auscultation, no wheezing or " rhonchi, no use of accessory muscles   Chest wall: No scars, normal excursion with respiration   Cardiovascular: Regular rhythm without murmur, gallop, or rubs   Gastrointestinal: Abdomen soft, nontender. Bowel sounds normal. Obese.  Musculoskeletal: Strength equal in upper extremities   Extremities: 1+ pitting edema   Neurologic: Sensation grossly intact, alert and oriented x3.    Intake/Output:   No intake/output data recorded.    Outpatient Medications  Current Outpatient Medications on File Prior to Visit   Medication Sig Dispense Refill    acetaminophen (Tylenol) 500 mg tablet TAKE 2 TABLETS BY MOUTH EVERY 6 HOURS 56 tablet 0    albuterol (Ventolin HFA) 90 mcg/actuation inhaler Inhale 1 puff every 4 hours if needed for wheezing or shortness of breath.      amLODIPine (Norvasc) 2.5 mg tablet Take 1 tablet (2.5 mg) by mouth once daily. 90 tablet 3    aspirin 81 mg EC tablet Take 1 tablet (81 mg) by mouth once daily.      cholecalciferol (Vitamin D-3) 50 mcg (2,000 unit) capsule Take by mouth.      Cinnamon 500 mg capsule Take by mouth.      fish oil concentrate (Omega-3) 120-180 mg capsule Take 1 capsule (1 g) by mouth once daily.      TEO OIL ORAL Take 1 capsule by mouth once daily.      latanoprost (Xalatan) 0.005 % ophthalmic solution Administer into affected eye(s).      levothyroxine (Synthroid, Levoxyl) 50 mcg tablet Take 1 tablet (50 mcg) by mouth once daily. 90 tablet 3    losartan-hydrochlorothiazide (Hyzaar) 100-25 mg tablet Take 1 tablet by mouth once daily.      metoprolol succinate XL (Toprol-XL) 100 mg 24 hr tablet Take 1 tablet (100 mg) by mouth once daily.      potassium gluconate 595 mg (99 mg) tablet Take by mouth.      simvastatin (Zocor) 10 mg tablet Take 0.5 tablets (5 mg) by mouth once daily at bedtime.      Symbicort 160-4.5 mcg/actuation inhaler Inhale 2 puffs 2 times a day.      ubidecarenone (coenzyme Q10) 100 mg tablet Take by mouth.      estradiol (Estrace) 0.01 % (0.1 mg/gram)  vaginal cream Insert pea size amount into vagina every night for 2 weeks, then 2x/week after 42.5 g 12    isosorbide mononitrate ER (Imdur) 30 mg 24 hr tablet Take 1 tablet (30 mg) by mouth once daily.      ketorolac (Toradol) 10 mg tablet TAKE 1 TABLET BY MOUTH FOUR TIMES A DAY 20 tablet 0    mometasone-formoterol (Dulera 200) 200-5 mcg/actuation inhaler Inhale 1 puff.      traMADol (Ultram) 50 mg tablet TAKE 1 TABLET BY MOUTH FOUR TIMES A DAY 10 tablet 0     No current facility-administered medications on file prior to visit.       Labs: (past 26 weeks)  Recent Results (from the past 4368 hour(s))   Urine Culture    Collection Time: 05/23/23  4:18 PM    Specimen: Urine    Unspecified   Result Value Ref Range    Urine Culture NO SIGNIFICANT GROWTH.    Urinalysis Microscopic Only    Collection Time: 05/23/23  4:18 PM   Result Value Ref Range    WBC, Urine 3 0 - 5 /HPF    RBC, Urine 9 (A) 0 - 5 /HPF    Squamous Epithelial Cells, Urine 6 /HPF   CBC    Collection Time: 06/02/23  8:02 AM   Result Value Ref Range    WBC 7.8 4.4 - 11.3 x10E9/L    RBC 4.58 4.00 - 5.20 x10E12/L    Hemoglobin 14.8 12.0 - 16.0 g/dL    Hematocrit 43.3 36.0 - 46.0 %    MCV 95 80 - 100 fL    MCHC 34.2 32.0 - 36.0 g/dL    Platelets 317 150 - 450 x10E9/L    RDW 12.9 11.5 - 14.5 %   Albumin , Urine Random    Collection Time: 06/02/23  8:02 AM   Result Value Ref Range    ALBUMIN (MG/L) IN URINE 12.8 Not Established mg/L    Albumin/Creatine Ratio 16.0 0.0 - 30.0 ug/mg crt    Creatinine, Urine 79.8 20.0 - 320.0 mg/dL   Comprehensive Metabolic Panel    Collection Time: 06/02/23  8:02 AM   Result Value Ref Range    Glucose 112 (H) 74 - 99 mg/dL    Sodium 139 136 - 145 mmol/L    Potassium 3.5 3.5 - 5.3 mmol/L    Chloride 104 98 - 107 mmol/L    Bicarbonate 24 21 - 32 mmol/L    Anion Gap 15 10 - 20 mmol/L    Urea Nitrogen 19 6 - 23 mg/dL    Creatinine 0.99 0.50 - 1.05 mg/dL    GFR Female 57 (A) >90 mL/min/1.73m2    Calcium 9.3 8.6 - 10.3 mg/dL    Albumin  3.9 3.4 - 5.0 g/dL    Alkaline Phosphatase 74 33 - 136 U/L    Total Protein 6.8 6.4 - 8.2 g/dL    AST 16 9 - 39 U/L    Total Bilirubin 0.6 0.0 - 1.2 mg/dL    ALT (SGPT) 35 7 - 45 U/L   Hemoglobin A1C    Collection Time: 06/02/23  8:02 AM   Result Value Ref Range    Hemoglobin A1C 6.5 (A) %    Estimated Average Glucose 140 MG/DL   Cholesterol, LDL Direct    Collection Time: 06/02/23  8:02 AM   Result Value Ref Range    LDL Direct 54 0 - 129 mg/dL   Lipid Panel    Collection Time: 06/02/23  8:02 AM   Result Value Ref Range    Cholesterol 110 0 - 199 mg/dL    HDL 59.7 mg/dL    Cholesterol/HDL Ratio 1.8     LDL 31 0 - 99 mg/dL    VLDL 19 0 - 40 mg/dL    Triglycerides 95 0 - 149 mg/dL   TSH with reflex to Free T4 if abnormal    Collection Time: 06/02/23  8:02 AM   Result Value Ref Range    TSH 1.34 0.44 - 3.98 mIU/L   Basic Metabolic Panel    Collection Time: 09/19/23  2:01 PM   Result Value Ref Range    Glucose 104 (H) 74 - 99 mg/dL    Sodium 140 136 - 145 mmol/L    Potassium 3.4 (L) 3.5 - 5.3 mmol/L    Chloride 106 98 - 107 mmol/L    Bicarbonate 26 21 - 32 mmol/L    Anion Gap 11 10 - 20 mmol/L    Urea Nitrogen 20 6 - 23 mg/dL    Creatinine 1.09 (H) 0.50 - 1.05 mg/dL    GFR Female 51 (A) >90 mL/min/1.73m2    Calcium 9.1 8.6 - 10.3 mg/dL   CBC    Collection Time: 09/19/23  2:01 PM   Result Value Ref Range    WBC 8.1 4.4 - 11.3 x10E9/L    RBC 4.64 4.00 - 5.20 x10E12/L    Hemoglobin 14.6 12.0 - 16.0 g/dL    Hematocrit 43.5 36.0 - 46.0 %    MCV 94 80 - 100 fL    MCHC 33.6 32.0 - 36.0 g/dL    Platelets 315 150 - 450 x10E9/L    RDW 12.9 11.5 - 14.5 %   Electrocardiogram 12 Lead    Collection Time: 09/19/23  2:32 PM   Result Value Ref Range    Ventricular Rate 79 BPM    Atrial Rate 79 BPM    FL Interval 154 ms    QRS Duration 87 ms    QT Interval 426 ms    QTC Calculation(Bazett) 489 ms    P Axis 28 degrees    R Axis -47 degrees    T Axis -2 degrees    QRS Count 12 beats    Q Onset 249 ms    T Offset 462 ms    QTC Fredericia  467 ms   Urine Culture    Collection Time: 09/25/23  3:12 PM    Specimen: Urine    Clean Catch/Voided   Result Value Ref Range    Urine Culture NO GROWTH    CBC and Auto Differential    Collection Time: 09/27/23  3:07 AM   Result Value Ref Range    WBC 9.9 4.4 - 11.3 x10E9/L    RBC 4.32 4.00 - 5.20 x10E12/L    Hemoglobin 13.8 12.0 - 16.0 g/dL    Hematocrit 40.9 36.0 - 46.0 %    MCV 95 80 - 100 fL    MCHC 33.7 32.0 - 36.0 g/dL    Platelets 309 150 - 450 x10E9/L    RDW 13.0 11.5 - 14.5 %    Neutrophils % 70.7 40.0 - 80.0 %    Immature Granulocytes %, Automated 0.3 0.0 - 0.9 %    Lymphocytes % 19.1 13.0 - 44.0 %    Monocytes % 7.4 2.0 - 10.0 %    Eosinophils % 1.9 0.0 - 6.0 %    Basophils % 0.6 0.0 - 2.0 %    Neutrophils Absolute 7.00 (H) 1.60 - 5.50 x10E9/L    Lymphocytes Absolute 1.89 0.80 - 3.00 x10E9/L    Monocytes Absolute 0.73 0.05 - 0.80 x10E9/L    Eosinophils Absolute 0.19 0.00 - 0.40 x10E9/L    Basophils Absolute 0.06 0.00 - 0.10 x10E9/L   Comprehensive Metabolic Panel    Collection Time: 09/27/23  3:07 AM   Result Value Ref Range    Glucose 131 (H) 74 - 99 mg/dL    Sodium 138 136 - 145 mmol/L    Potassium 3.6 3.5 - 5.3 mmol/L    Chloride 105 98 - 107 mmol/L    Bicarbonate 24 21 - 32 mmol/L    Anion Gap 13 10 - 20 mmol/L    Urea Nitrogen 16 6 - 23 mg/dL    Creatinine 0.97 0.50 - 1.05 mg/dL    GFR Female 58 (A) >90 mL/min/1.73m2    Calcium 9.0 8.6 - 10.3 mg/dL    Albumin 3.8 3.4 - 5.0 g/dL    Alkaline Phosphatase 74 33 - 136 U/L    Total Protein 6.4 6.4 - 8.2 g/dL    AST 17 9 - 39 U/L    Total Bilirubin 0.5 0.0 - 1.2 mg/dL    ALT (SGPT) 31 7 - 45 U/L   Urinalysis with Reflex Microscopic and Culture    Collection Time: 09/27/23  3:16 AM   Result Value Ref Range    Color, Urine Straw STRAW,YELLOW    Appearance, Urine Clear CLEAR    Specific Gravity, Urine 1.005 1.005 - 1.035    pH, Urine 6.0 5.0 - 8.0    Protein, Urine Negative NEGATIVE mg/dL    Glucose, Urine Negative NEGATIVE mg/dL    Blood, Urine MODERATE(2+)  (A) NEGATIVE    Ketones, Urine Negative NEGATIVE mg/dL    Bilirubin, Urine Negative NEGATIVE    Urobilinogen, Urine <2.0 0.0 - 1.9 mg/dL    Nitrite, Urine Negative NEGATIVE    Leukocyte Esterase, Urine SMALL(1+) (A) NEGATIVE   Urinalysis Microscopic Only    Collection Time: 09/27/23  3:16 AM   Result Value Ref Range    WBC, Urine 3 0 - 5 /HPF    RBC, Urine 7 (A) 0 - 5 /HPF    Squamous Epithelial Cells, Urine 1 /HPF    Transitional Epithelial Cells, Urine <1 /HPF    Mucus, Urine 1+ /LPF    Hyaline Casts, Urine OCC (A) /LPF    Amorphous Crystals, Urine 1+ /HPF   Urine Culture    Collection Time: 09/27/23  3:16 AM    Specimen: Urine   Result Value Ref Range    Urine Culture NO SIGNIFICANT GROWTH.    Sars-CoV-2 PCR, Symptomatic    Collection Time: 09/27/23  5:35 AM   Result Value Ref Range    SARS-CoV-2 Result NOT DETECTED Not Detected       ECG  No results found for this or any previous visit (from the past 4464 hour(s)).    Echocardiogram  No results found for this or any previous visit from the past 1095 days.      CV Studies:  EKG: No results found for this or any previous visit (from the past 4464 hour(s)).  Echocardiogram:   [place] [address]  Phone [phone] Fax [phone]    TRANSTHORACIC ECHOCARDIOGRAM REPORT      Patient Name:     VERNA Trujillo Physician:   [id number] Rebel Nugent DO  Study Date:       5/17/2022         Referring Physician: [id number] ELISE  MRN/PID:          [id number]          PCP:  Accession/Order#: [id number]      Department Location: [facility]  YOB: 1940         Fellow:  Gender:           F                 Nurse:  Admit Date:                         Sonographer:         Arianna Lion RDCS  Admission Status: Outpatient        Additional Staff:  Height:           157.48 cm         CC Report to:  Weight:           78.93 kg          Study Type:           Echocardiogram  BSA:              1.80 m2  Blood Pressure: 140 /74 mmHg    Diagnosis/ICD: I25.10-Atherosclerotic heart disease of native coronary artery  without angina pectoris  Indication:    ASHD  Procedure/CPT: Echo Complete w Full [id number]    Patient History:  Pertinent History: CAD, HTN and Hyperlipidemia.    Study Detail: The following Echo studies were performed: 2D, M-Mode, Doppler and  color flow.      PHYSICIAN INTERPRETATION:  Left Ventricle: The left ventricular systolic function is low normal, with an estimated ejection fraction of 50-55%. There are no regional wall motion abnormalities. The left ventricular cavity size is normal. Spectral Doppler shows an impaired relaxation pattern of left ventricular diastolic filling.  Left Atrium: The left atrium is normal in size.  Right Ventricle: The right ventricle is normal in size. There is low normal right ventricular systolic function.  Right Atrium: The right atrium is normal in size.  Aortic Valve: The aortic valve is trileaflet. There is trivial aortic valve regurgitation.  Mitral Valve: The mitral valve is normal in structure. There is trace mitral valve regurgitation.  Tricuspid Valve: The tricuspid valve is structurally normal. There is trace tricuspid regurgitation.  Pulmonic Valve: The pulmonic valve is structurally normal. There is physiologic pulmonic valve regurgitation.  Pericardium: There is no pericardial effusion noted.  Aorta: The aortic root is normal.      CONCLUSIONS:  1. The left ventricular systolic function is low normal with a 50-55% estimated ejection fraction.  2. Spectral Doppler shows an impaired relaxation pattern of left ventricular diastolic filling.    QUANTITATIVE DATA SUMMARY:  2D MEASUREMENTS:  Normal Ranges:  Ao Root d:     3.50 cm   (2.0-3.7cm)  IVSd:          1.19 cm   (0.6-1.1cm)  LVPWd:         0.68 cm   (0.6-1.1cm)  LVIDd:         4.06 cm   (3.9-5.9cm)  LVIDs:         2.78 cm  LV Mass Index: 65.5 g/m2  LV % FS         31.5 %    LA VOLUME:  Normal Ranges:  LA Vol A4C:        35.9 ml    (22+/-6mL/m2)  LA Vol A2C:        25.7 ml  LA Vol BP:         30.4 ml  LA Vol Index A4C:  19.9ml/m2  LA Vol Index A2C:  14.3 ml/m2  LA Vol Index BP:   16.9 ml/m2  LA Area A4C:       13.0 cm2  LA Area A2C:       11.0 cm2  LA Major Axis A4C: 4.0 cm  LA Major Axis A2C: 4.0 cm  LA Volume Index:   17.0 ml/m2    RA VOLUME BY A/L METHOD:  Normal Ranges:  RA Area A4C: 9.3 cm2    M-MODE MEASUREMENTS:  Normal Ranges:  Ao Root: 2.30 cm (2.0-3.7cm)  AoV Exc: 2.00 cm (1.5-2.5cm)  LAs:     4.00 cm (2.7-4.0cm)    AORTA MEASUREMENTS:  Normal Ranges:  AoV Exc:   2.00 cm (1.5-2.5cm)  Asc Ao, d: 3.15 cm (2.1-3.4cm)    LV SYSTOLIC FUNCTION BY 2D PLANIMETRY (MOD):  Normal Ranges:  EF-A4C View: 52.4 % (>55%)  EF-A2C View: 48.6 %  EF-Biplane:  49.5 %    LV DIASTOLIC FUNCTION:  Normal Ranges:  MV Peak E:    0.49 m/s (0.7-1.2 m/s)  MV Peak A:    0.61 m/s (0.42-0.7 m/s)  E/A Ratio:    0.80     (1.0-2.2)  MV e'         0.06 m/s (>8.0)  MV lateral e' 0.05 m/s  MV medial e'  0.06 m/s  E/e' Ratio:   8.87     (<8.0)  LV IVRT:      109 msec (<100ms)    AORTIC VALVE:  Normal Ranges:  LVOT Max Stiven:  0.99 m/s (<1.1m/s)  LVOT VTI:      24.50 cm  LVOT Diameter: 1.90 cm  (1.8-2.4cm)    RIGHT VENTRICLE:  RV 1   2.6 cm  RV 2   1.9 cm  RV 3   6.0 cm  TAPSE: 24.5 mm  RV s'  0.15 m/s    TRICUSPID VALVE/RVSP:  Normal Ranges:  Peak TR Velocity: 2.28 m/s  RV Syst Pressure: 23.8 mmHg (< 30mmHg)  TV E Vmax:        0.39 m/s  (0.3-0.7m/s)  TV A Vmax:        0.35 m/s  IVC Diam:         0.98 cm      [id number] Rebel Nugent DO  Electronically signed on 5/17/2022 at 12:10:42 PM         Final     Stress Testing IMESULT([id number]:1:1825): No results found for this or any previous visit from the past 1825 days.    Cardiac Catheterization: No results found for this or any previous visit from the past 1825 days.  No results found for this or any previous visit from the past 3650 days.     Cardiac Scoring: No  results found for this or any previous visit from the past 1825 days.    AAA : No results found for this or any previous visit from the past 1825 days.    OTHER: No results found for this or any previous visit from the past 1825 days.    LAST IMAGING RESULTS  Electrocardiogram 12 Lead  Sinus rhythm  Left anterior fascicular block  LVH with secondary repolarization abnormality  Probable anterior infarct, age indeterminate  Confirmed by Zeeshan Chaidez ([id number]) on 9/20/2023 8:47:50 AM  Problem List Items Addressed This Visit       ASHD (arteriosclerotic heart disease) - Primary    Relevant Medications    simvastatin (Zocor) 10 mg tablet    metoprolol succinate XL (Toprol-XL) 100 mg 24 hr tablet    losartan-hydrochlorothiazide (Hyzaar) 100-25 mg tablet    Other Relevant Orders    ECG 12 Lead (Completed)    Comprehensive Metabolic Panel    Lipid Panel    Magnesium    CBC    Follow Up In Cardiology    Bilateral lower extremity edema    Hyperlipidemia    Relevant Medications    simvastatin (Zocor) 10 mg tablet    metoprolol succinate XL (Toprol-XL) 100 mg 24 hr tablet    losartan-hydrochlorothiazide (Hyzaar) 100-25 mg tablet    Other Relevant Orders    ECG 12 Lead (Completed)    Comprehensive Metabolic Panel    Lipid Panel    Magnesium    CBC    Follow Up In Cardiology    Hypertension    Relevant Medications    simvastatin (Zocor) 10 mg tablet    metoprolol succinate XL (Toprol-XL) 100 mg 24 hr tablet    losartan-hydrochlorothiazide (Hyzaar) 100-25 mg tablet    Other Relevant Orders    ECG 12 Lead (Completed)    Comprehensive Metabolic Panel    Lipid Panel    Magnesium    CBC    Follow Up In Cardiology    Obesity (BMI 30-39.9)    Relevant Medications    simvastatin (Zocor) 10 mg tablet    metoprolol succinate XL (Toprol-XL) 100 mg 24 hr tablet    losartan-hydrochlorothiazide (Hyzaar) 100-25 mg tablet    Other Relevant Orders    ECG 12 Lead (Completed)    Comprehensive Metabolic Panel    Lipid Panel    Magnesium    CBC     Follow Up In Cardiology    COPD (chronic obstructive pulmonary disease) (CMS/[facility])    Relevant Medications    simvastatin (Zocor) 10 mg tablet    metoprolol succinate XL (Toprol-XL) 100 mg 24 hr tablet    losartan-hydrochlorothiazide (Hyzaar) 100-25 mg tablet    Other Relevant Orders    ECG 12 Lead (Completed)    Comprehensive Metabolic Panel    Lipid Panel    Magnesium    CBC    Follow Up In Cardiology            Rebel Nugent DO, FACC, FACOI

## 2023-11-24 ENCOUNTER — APPOINTMENT (OUTPATIENT)
Dept: CARDIOLOGY | Facility: CLINIC | Age: 83
End: 2023-11-24
Payer: MEDICARE

## 2023-11-27 ENCOUNTER — OFFICE VISIT (OUTPATIENT)
Dept: CARDIOLOGY | Facility: CLINIC | Age: 83
End: 2023-11-27
Payer: MEDICARE

## 2023-11-27 VITALS
BODY MASS INDEX: 29.44 KG/M2 | WEIGHT: 160 LBS | SYSTOLIC BLOOD PRESSURE: 130 MMHG | HEART RATE: 73 BPM | DIASTOLIC BLOOD PRESSURE: 88 MMHG | HEIGHT: 62 IN

## 2023-11-27 DIAGNOSIS — E66.9 OBESITY (BMI 30-39.9): ICD-10-CM

## 2023-11-27 DIAGNOSIS — R60.0 BILATERAL LOWER EXTREMITY EDEMA: ICD-10-CM

## 2023-11-27 DIAGNOSIS — E78.2 MIXED HYPERLIPIDEMIA: ICD-10-CM

## 2023-11-27 DIAGNOSIS — I10 PRIMARY HYPERTENSION: ICD-10-CM

## 2023-11-27 DIAGNOSIS — I25.10 ASHD (ARTERIOSCLEROTIC HEART DISEASE): Primary | ICD-10-CM

## 2023-11-27 DIAGNOSIS — J44.9 CHRONIC OBSTRUCTIVE PULMONARY DISEASE, UNSPECIFIED COPD TYPE (MULTI): ICD-10-CM

## 2023-11-27 PROCEDURE — 3079F DIAST BP 80-89 MM HG: CPT | Performed by: INTERNAL MEDICINE

## 2023-11-27 PROCEDURE — 1036F TOBACCO NON-USER: CPT | Performed by: INTERNAL MEDICINE

## 2023-11-27 PROCEDURE — 1160F RVW MEDS BY RX/DR IN RCRD: CPT | Performed by: INTERNAL MEDICINE

## 2023-11-27 PROCEDURE — 3075F SYST BP GE 130 - 139MM HG: CPT | Performed by: INTERNAL MEDICINE

## 2023-11-27 PROCEDURE — 1159F MED LIST DOCD IN RCRD: CPT | Performed by: INTERNAL MEDICINE

## 2023-11-27 PROCEDURE — 99214 OFFICE O/P EST MOD 30 MIN: CPT | Performed by: INTERNAL MEDICINE

## 2023-11-27 PROCEDURE — 93000 ELECTROCARDIOGRAM COMPLETE: CPT | Performed by: INTERNAL MEDICINE

## 2023-11-27 RX ORDER — SIMVASTATIN 10 MG/1
5 TABLET, FILM COATED ORAL NIGHTLY
Qty: 45 TABLET | Refills: 3 | Status: SHIPPED | OUTPATIENT
Start: 2023-11-27 | End: 2024-01-19 | Stop reason: SDUPTHER

## 2023-11-27 RX ORDER — METOPROLOL SUCCINATE 100 MG/1
100 TABLET, EXTENDED RELEASE ORAL DAILY
Qty: 90 TABLET | Refills: 3 | Status: SHIPPED | OUTPATIENT
Start: 2023-11-27 | End: 2024-01-18 | Stop reason: SDUPTHER

## 2023-11-27 RX ORDER — LOSARTAN POTASSIUM AND HYDROCHLOROTHIAZIDE 25; 100 MG/1; MG/1
1 TABLET ORAL DAILY
Qty: 90 TABLET | Refills: 3 | Status: SHIPPED | OUTPATIENT
Start: 2023-11-27 | End: 2024-01-18 | Stop reason: SDUPTHER

## 2023-12-11 ENCOUNTER — LAB (OUTPATIENT)
Dept: LAB | Facility: LAB | Age: 83
End: 2023-12-11
Payer: MEDICARE

## 2023-12-11 DIAGNOSIS — E11.9 DIET-CONTROLLED TYPE 2 DIABETES MELLITUS (MULTI): ICD-10-CM

## 2023-12-11 DIAGNOSIS — I10 PRIMARY HYPERTENSION: ICD-10-CM

## 2023-12-11 DIAGNOSIS — E78.2 MIXED HYPERLIPIDEMIA: ICD-10-CM

## 2023-12-11 DIAGNOSIS — E03.9 HYPOTHYROIDISM, UNSPECIFIED TYPE: ICD-10-CM

## 2023-12-11 DIAGNOSIS — N18.31 CHRONIC KIDNEY DISEASE, STAGE 3A (MULTI): ICD-10-CM

## 2023-12-11 LAB
ALBUMIN SERPL BCP-MCNC: 4 G/DL (ref 3.4–5)
ALP SERPL-CCNC: 66 U/L (ref 33–136)
ALT SERPL W P-5'-P-CCNC: 34 U/L (ref 7–45)
ANION GAP SERPL CALC-SCNC: 15 MMOL/L (ref 10–20)
AST SERPL W P-5'-P-CCNC: 16 U/L (ref 9–39)
BILIRUB SERPL-MCNC: 0.7 MG/DL (ref 0–1.2)
BUN SERPL-MCNC: 18 MG/DL (ref 6–23)
CALCIUM SERPL-MCNC: 8.9 MG/DL (ref 8.6–10.3)
CHLORIDE SERPL-SCNC: 103 MMOL/L (ref 98–107)
CHOLEST SERPL-MCNC: 125 MG/DL (ref 0–199)
CHOLESTEROL/HDL RATIO: 2.1
CO2 SERPL-SCNC: 28 MMOL/L (ref 21–32)
CREAT SERPL-MCNC: 0.94 MG/DL (ref 0.5–1.05)
EST. AVERAGE GLUCOSE BLD GHB EST-MCNC: 143 MG/DL
GFR SERPL CREATININE-BSD FRML MDRD: 61 ML/MIN/1.73M*2
GLUCOSE SERPL-MCNC: 101 MG/DL (ref 74–99)
HBA1C MFR BLD: 6.6 %
HDLC SERPL-MCNC: 59.9 MG/DL
LDLC SERPL CALC-MCNC: 44 MG/DL
LDLC SERPL DIRECT ASSAY-MCNC: 56 MG/DL (ref 0–129)
NON HDL CHOLESTEROL: 65 MG/DL (ref 0–149)
POTASSIUM SERPL-SCNC: 3.7 MMOL/L (ref 3.5–5.3)
PROT SERPL-MCNC: 6.5 G/DL (ref 6.4–8.2)
SODIUM SERPL-SCNC: 142 MMOL/L (ref 136–145)
TRIGL SERPL-MCNC: 105 MG/DL (ref 0–149)
TSH SERPL-ACNC: 1.52 MIU/L (ref 0.44–3.98)
VLDL: 21 MG/DL (ref 0–40)

## 2023-12-11 PROCEDURE — 84443 ASSAY THYROID STIM HORMONE: CPT

## 2023-12-11 PROCEDURE — 83036 HEMOGLOBIN GLYCOSYLATED A1C: CPT

## 2023-12-11 PROCEDURE — 80053 COMPREHEN METABOLIC PANEL: CPT

## 2023-12-11 PROCEDURE — 83721 ASSAY OF BLOOD LIPOPROTEIN: CPT

## 2023-12-11 PROCEDURE — 80061 LIPID PANEL: CPT

## 2023-12-11 PROCEDURE — 36415 COLL VENOUS BLD VENIPUNCTURE: CPT

## 2023-12-15 PROBLEM — J44.9 COPD (CHRONIC OBSTRUCTIVE PULMONARY DISEASE) (MULTI): Status: ACTIVE | Noted: 2023-06-08

## 2023-12-15 NOTE — PROGRESS NOTES
Subjective   Patient ID: Tania Guerrero is a 83 y.o. female who presents for No chief complaint on file..  HPI  Patient presents today for follow up labs and chronic conditions.  Patient otherwise feels well. No other complaints or concerns.    The patient's relevant past medical, surgical, family, and social history was reviewed in [facility].  All pertinent lab work and results for this visit were reviewed with patient.    Lab on 12/11/2023   Component Date Value Ref Range Status    LDL, Direct 12/11/2023 56  0 - 129 mg/dL Final    Cholesterol 12/11/2023 125  0 - 199 mg/dL Final          Age      Desirable   Borderline High   High     0-19 Y     0 - 169       170 - 199     >/= 200    20-24 Y     0 - 189       190 - 224     >/= 225         >24 Y     0 - 199       200 - 239     >/= 240   **All ranges are based on fasting samples. Specific   therapeutic targets will vary based on patient-specific   cardiac risk.    Pediatric guidelines reference:Pediatrics 2011, 128(S5).Adult guidelines reference: NCEP ATPIII Guidelines,SUE 2001, 258:2486-97    Venipuncture immediately after or during the administration of Metamizole may lead to falsely low results. Testing should be performed immediately prior to Metamizole dosing.    HDL-Cholesterol 12/11/2023 59.9  mg/dL Final      Age       Very Low   Low     Normal    High    0-19 Y    < 35      < 40     40-45     ----  20-24 Y    ----     < 40      >45      ----        >24 Y      ----     < 40     40-60      >60      Cholesterol/HDL Ratio 12/11/2023 2.1   Final      Ref Values  Desirable  < 3.4  High Risk  > 5.0    LDL Calculated 12/11/2023 44  <=99 mg/dL Final                                Near   Borderline      AGE      Desirable  Optimal    High     High     Very High     0-19 Y     0 - 109     ---    110-129   >/= 130     ----    20-24 Y     0 - 119     ---    120-159   >/= 160     ----      >24 Y     0 -  99   100-129  130-159   160-189     >/=190      VLDL 12/11/2023 21  0  - 40 mg/dL Final    Triglycerides 12/11/2023 105  0 - 149 mg/dL Final       Age         Desirable   Borderline High   High     Very High   0 D-90 D    19 - 174         ----         ----        ----  91 D- 9 Y     0 -  74        75 -  99     >/= 100      ----    10-19 Y     0 -  89        90 - 129     >/= 130      ----    20-24 Y     0 - 114       115 - 149     >/= 150      ----         >24 Y     0 - 149       150 - 199    200- 499    >/= 500    Venipuncture immediately after or during the administration of Metamizole may lead to falsely low results. Testing should be performed immediately prior to Metamizole dosing.    Non HDL Cholesterol 12/11/2023 65  0 - 149 mg/dL Final          Age       Desirable   Borderline High   High     Very High     0-19 Y     0 - 119       120 - 144     >/= 145    >/= 160    20-24 Y     0 - 149       150 - 189     >/= 190      ----         >24 Y    30 mg/dL above LDL Cholesterol goal      Glucose 12/11/2023 101 (H)  74 - 99 mg/dL Final    Sodium 12/11/2023 142  136 - 145 mmol/L Final    Potassium 12/11/2023 3.7  3.5 - 5.3 mmol/L Final    Chloride 12/11/2023 103  98 - 107 mmol/L Final    Bicarbonate 12/11/2023 28  21 - 32 mmol/L Final    Anion Gap 12/11/2023 15  10 - 20 mmol/L Final    Urea Nitrogen 12/11/2023 18  6 - 23 mg/dL Final    Creatinine 12/11/2023 0.94  0.50 - 1.05 mg/dL Final    eGFR 12/11/2023 61  >60 mL/min/1.73m*2 Final    Calculations of estimated GFR are performed using the 2021 CKD-EPI Study Refit equation without the race variable for the IDMS-Traceable creatinine methods.  https://jasn.asnjournals.org/content/early/2021/09/22/ASN.1223294551    Calcium 12/11/2023 8.9  8.6 - 10.3 mg/dL Final    Albumin 12/11/2023 4.0  3.4 - 5.0 g/dL Final    Alkaline Phosphatase 12/11/2023 66  33 - 136 U/L Final    Total Protein 12/11/2023 6.5  6.4 - 8.2 g/dL Final    AST 12/11/2023 16  9 - 39 U/L Final    Bilirubin, Total 12/11/2023 0.7  0.0 - 1.2 mg/dL Final    ALT 12/11/2023 34  7 - 45  U/L Final    Patients treated with Sulfasalazine may generate falsely decreased results for ALT.    Thyroid Stimulating Hormone 12/11/2023 1.52  0.44 - 3.98 mIU/L Final    Hemoglobin A1C 12/11/2023 6.6 (H)  see below % Final    Estimated Average Glucose 12/11/2023 143  Not Established mg/dL Final           Review of Systems   A complete review of systems was performed and all systems were normal except what is noted in the HPI.        Objective   There were no vitals taken for this visit.   Physical Exam    Health Maintenance Due   Topic Date Due    COVID-19 Vaccine (1) Never done    Diabetes: Foot Exam  Never done    Diabetes: Retinopathy Screening  Never done    Hepatitis A Vaccines (1 of 2 - Risk 2-dose series) Never done    Hepatitis B Vaccines (1 of 3 - Risk 3-dose series) Never done    Zoster Vaccines (2 of 3) 12/13/2018    Influenza Vaccine (1) 09/01/2023    Bone Density Scan  02/02/2024        Assessment/Plan   Problem List Items Addressed This Visit       Hypothyroid     Well controlled continue current medication. Reevaluate in 6 months.            Relevant Orders    TSH with reflex to Free T4 if abnormal    Chronic kidney disease, stage 3a (CMS/HCC)     Stable, no proteinuria  On ARB  Continue to monitor   Recheck 6 months         Relevant Orders    CBC    Comprehensive Metabolic Panel    Albumin , Urine Random    Magnesium    Parathyroid Hormone, Intact    Vitamin D 25-Hydroxy,Total (for eval of Vitamin D levels)    Diet-controlled type 2 diabetes mellitus (CMS/HCC) - Primary     well controlled continue work on diet  On ARB, statin  Reevaluate in 6 months.           Relevant Orders    Comprehensive Metabolic Panel    Albumin , Urine Random    Hemoglobin A1C    Vitamin B12    Hyperlipidemia     Well controlled continue current medication. Reevaluate in 6 months.          Relevant Orders    Lipid Panel    Cholesterol, LDL Direct    Hypertension     Well controlled continue current medication. Reevaluate  in 6 months.          Relevant Orders    Comprehensive Metabolic Panel    COPD (chronic obstructive pulmonary disease) (CMS/HCC)     Stable   Continue current medication  Recheck 6 months              Patient understands and agrees with care plan.           Sue Murray MD

## 2023-12-18 ENCOUNTER — APPOINTMENT (OUTPATIENT)
Dept: PRIMARY CARE | Facility: CLINIC | Age: 83
End: 2023-12-18
Payer: MEDICARE

## 2023-12-28 PROBLEM — E66.9 OBESITY (BMI 30-39.9): Status: RESOLVED | Noted: 2023-06-08 | Resolved: 2023-12-28

## 2023-12-28 NOTE — PROGRESS NOTES
Subjective   Patient ID: Tania Guerrero is a 83 y.o. female who presents for No chief complaint on file..  HPI  Patient presents today for follow up labs and chronic conditions.  Patient otherwise feels well. No other complaints or concerns.    The patient's relevant past medical, surgical, family, and social history was reviewed in [facility].  All pertinent lab work and results for this visit were reviewed with patient.    Lab on 12/11/2023   Component Date Value Ref Range Status    LDL, Direct 12/11/2023 56  0 - 129 mg/dL Final    Cholesterol 12/11/2023 125  0 - 199 mg/dL Final          Age      Desirable   Borderline High   High     0-19 Y     0 - 169       170 - 199     >/= 200    20-24 Y     0 - 189       190 - 224     >/= 225         >24 Y     0 - 199       200 - 239     >/= 240   **All ranges are based on fasting samples. Specific   therapeutic targets will vary based on patient-specific   cardiac risk.    Pediatric guidelines reference:Pediatrics 2011, 128(S5).Adult guidelines reference: NCEP ATPIII Guidelines,SUE 2001, 258:2486-97    Venipuncture immediately after or during the administration of Metamizole may lead to falsely low results. Testing should be performed immediately prior to Metamizole dosing.    HDL-Cholesterol 12/11/2023 59.9  mg/dL Final      Age       Very Low   Low     Normal    High    0-19 Y    < 35      < 40     40-45     ----  20-24 Y    ----     < 40      >45      ----        >24 Y      ----     < 40     40-60      >60      Cholesterol/HDL Ratio 12/11/2023 2.1   Final      Ref Values  Desirable  < 3.4  High Risk  > 5.0    LDL Calculated 12/11/2023 44  <=99 mg/dL Final                                Near   Borderline      AGE      Desirable  Optimal    High     High     Very High     0-19 Y     0 - 109     ---    110-129   >/= 130     ----    20-24 Y     0 - 119     ---    120-159   >/= 160     ----      >24 Y     0 -  99   100-129  130-159   160-189     >/=190      VLDL 12/11/2023 21  0  - 40 mg/dL Final    Triglycerides 12/11/2023 105  0 - 149 mg/dL Final       Age         Desirable   Borderline High   High     Very High   0 D-90 D    19 - 174         ----         ----        ----  91 D- 9 Y     0 -  74        75 -  99     >/= 100      ----    10-19 Y     0 -  89        90 - 129     >/= 130      ----    20-24 Y     0 - 114       115 - 149     >/= 150      ----         >24 Y     0 - 149       150 - 199    200- 499    >/= 500    Venipuncture immediately after or during the administration of Metamizole may lead to falsely low results. Testing should be performed immediately prior to Metamizole dosing.    Non HDL Cholesterol 12/11/2023 65  0 - 149 mg/dL Final          Age       Desirable   Borderline High   High     Very High     0-19 Y     0 - 119       120 - 144     >/= 145    >/= 160    20-24 Y     0 - 149       150 - 189     >/= 190      ----         >24 Y    30 mg/dL above LDL Cholesterol goal      Glucose 12/11/2023 101 (H)  74 - 99 mg/dL Final    Sodium 12/11/2023 142  136 - 145 mmol/L Final    Potassium 12/11/2023 3.7  3.5 - 5.3 mmol/L Final    Chloride 12/11/2023 103  98 - 107 mmol/L Final    Bicarbonate 12/11/2023 28  21 - 32 mmol/L Final    Anion Gap 12/11/2023 15  10 - 20 mmol/L Final    Urea Nitrogen 12/11/2023 18  6 - 23 mg/dL Final    Creatinine 12/11/2023 0.94  0.50 - 1.05 mg/dL Final    eGFR 12/11/2023 61  >60 mL/min/1.73m*2 Final    Calculations of estimated GFR are performed using the 2021 CKD-EPI Study Refit equation without the race variable for the IDMS-Traceable creatinine methods.  https://jasn.asnjournals.org/content/early/2021/09/22/ASN.0076078792    Calcium 12/11/2023 8.9  8.6 - 10.3 mg/dL Final    Albumin 12/11/2023 4.0  3.4 - 5.0 g/dL Final    Alkaline Phosphatase 12/11/2023 66  33 - 136 U/L Final    Total Protein 12/11/2023 6.5  6.4 - 8.2 g/dL Final    AST 12/11/2023 16  9 - 39 U/L Final    Bilirubin, Total 12/11/2023 0.7  0.0 - 1.2 mg/dL Final    ALT 12/11/2023 34  7 - 45  U/L Final    Patients treated with Sulfasalazine may generate falsely decreased results for ALT.    Thyroid Stimulating Hormone 12/11/2023 1.52  0.44 - 3.98 mIU/L Final    Hemoglobin A1C 12/11/2023 6.6 (H)  see below % Final    Estimated Average Glucose 12/11/2023 143  Not Established mg/dL Final           Review of Systems   A complete review of systems was performed and all systems were normal except what is noted in the HPI.        Objective   There were no vitals taken for this visit.   Physical Exam    Health Maintenance Due   Topic Date Due    COVID-19 Vaccine (1) Never done    Diabetes: Foot Exam  Never done    Diabetes: Retinopathy Screening  Never done    Hepatitis A Vaccines (1 of 2 - Risk 2-dose series) Never done    Hepatitis B Vaccines (1 of 3 - Risk 3-dose series) Never done    Zoster Vaccines (2 of 3) 12/13/2018    Influenza Vaccine (1) 09/01/2023    Bone Density Scan  02/02/2024        Assessment/Plan   Problem List Items Addressed This Visit       Hypothyroid     Well controlled continue current medication. Reevaluate in 6 months.            Relevant Orders    TSH with reflex to Free T4 if abnormal    Chronic kidney disease, stage 3a (CMS/HCC)     Stable, no proteinuria  On ARB  Continue to monitor   Recheck 6 months         Relevant Orders    CBC    Comprehensive Metabolic Panel    Albumin , Urine Random    Diet-controlled type 2 diabetes mellitus (CMS/HCC)     well controlled continue work on diet  On ARB, statin  Reevaluate in 6 months.           Relevant Orders    Comprehensive Metabolic Panel    Albumin , Urine Random    Hemoglobin A1C    Vitamin B12    Hyperlipidemia     Well controlled continue current medication. Reevaluate in 6 months.          Relevant Orders    Lipid Panel    Cholesterol, LDL Direct    Hypertension - Primary     Well controlled continue current medication. Reevaluate in 6 months.          Relevant Orders    Comprehensive Metabolic Panel    COPD (chronic obstructive  pulmonary disease) (CMS/HCC)     Stable   Continue current medication  Recheck 6 months              Patient understands and agrees with care plan.           Sue Murray MD

## 2023-12-29 ENCOUNTER — APPOINTMENT (OUTPATIENT)
Dept: PRIMARY CARE | Facility: CLINIC | Age: 83
End: 2023-12-29
Payer: MEDICARE

## 2024-01-18 DIAGNOSIS — I10 PRIMARY HYPERTENSION: ICD-10-CM

## 2024-01-18 DIAGNOSIS — E66.9 OBESITY (BMI 30-39.9): ICD-10-CM

## 2024-01-18 DIAGNOSIS — I25.10 ASHD (ARTERIOSCLEROTIC HEART DISEASE): ICD-10-CM

## 2024-01-18 DIAGNOSIS — J44.9 CHRONIC OBSTRUCTIVE PULMONARY DISEASE, UNSPECIFIED COPD TYPE (MULTI): ICD-10-CM

## 2024-01-18 DIAGNOSIS — E78.2 MIXED HYPERLIPIDEMIA: ICD-10-CM

## 2024-01-18 RX ORDER — METOPROLOL SUCCINATE 100 MG/1
100 TABLET, EXTENDED RELEASE ORAL DAILY
Qty: 90 TABLET | Refills: 3 | Status: SHIPPED | OUTPATIENT
Start: 2024-01-18

## 2024-01-18 RX ORDER — LOSARTAN POTASSIUM AND HYDROCHLOROTHIAZIDE 25; 100 MG/1; MG/1
1 TABLET ORAL DAILY
Qty: 90 TABLET | Refills: 1 | Status: SHIPPED | OUTPATIENT
Start: 2024-01-18

## 2024-01-19 DIAGNOSIS — J44.9 CHRONIC OBSTRUCTIVE PULMONARY DISEASE, UNSPECIFIED COPD TYPE (MULTI): ICD-10-CM

## 2024-01-19 DIAGNOSIS — I25.10 ASHD (ARTERIOSCLEROTIC HEART DISEASE): ICD-10-CM

## 2024-01-19 DIAGNOSIS — I10 PRIMARY HYPERTENSION: ICD-10-CM

## 2024-01-19 DIAGNOSIS — E66.9 OBESITY (BMI 30-39.9): ICD-10-CM

## 2024-01-19 DIAGNOSIS — E78.2 MIXED HYPERLIPIDEMIA: ICD-10-CM

## 2024-01-19 RX ORDER — SIMVASTATIN 10 MG/1
5 TABLET, FILM COATED ORAL NIGHTLY
Qty: 45 TABLET | Refills: 3 | Status: SHIPPED | OUTPATIENT
Start: 2024-01-19

## 2024-02-02 ENCOUNTER — OFFICE VISIT (OUTPATIENT)
Dept: PRIMARY CARE | Facility: CLINIC | Age: 84
End: 2024-02-02
Payer: MEDICARE

## 2024-02-02 VITALS
DIASTOLIC BLOOD PRESSURE: 75 MMHG | HEART RATE: 70 BPM | BODY MASS INDEX: 32.06 KG/M2 | HEIGHT: 62 IN | SYSTOLIC BLOOD PRESSURE: 114 MMHG | OXYGEN SATURATION: 95 % | WEIGHT: 174.2 LBS | TEMPERATURE: 98 F

## 2024-02-02 DIAGNOSIS — I10 PRIMARY HYPERTENSION: ICD-10-CM

## 2024-02-02 DIAGNOSIS — E03.9 HYPOTHYROIDISM, UNSPECIFIED TYPE: ICD-10-CM

## 2024-02-02 DIAGNOSIS — Z78.0 ASYMPTOMATIC MENOPAUSE: ICD-10-CM

## 2024-02-02 DIAGNOSIS — N18.31 CHRONIC KIDNEY DISEASE, STAGE 3A (MULTI): ICD-10-CM

## 2024-02-02 DIAGNOSIS — E78.2 MIXED HYPERLIPIDEMIA: ICD-10-CM

## 2024-02-02 DIAGNOSIS — Z00.00 ANNUAL PHYSICAL EXAM: ICD-10-CM

## 2024-02-02 DIAGNOSIS — E11.9 DIET-CONTROLLED TYPE 2 DIABETES MELLITUS (MULTI): ICD-10-CM

## 2024-02-02 DIAGNOSIS — J44.9 CHRONIC OBSTRUCTIVE PULMONARY DISEASE, UNSPECIFIED COPD TYPE (MULTI): ICD-10-CM

## 2024-02-02 DIAGNOSIS — Z00.00 MEDICARE ANNUAL WELLNESS VISIT, SUBSEQUENT: Primary | ICD-10-CM

## 2024-02-02 PROCEDURE — 3078F DIAST BP <80 MM HG: CPT | Performed by: FAMILY MEDICINE

## 2024-02-02 PROCEDURE — 1124F ACP DISCUSS-NO DSCNMKR DOCD: CPT | Performed by: FAMILY MEDICINE

## 2024-02-02 PROCEDURE — 1159F MED LIST DOCD IN RCRD: CPT | Performed by: FAMILY MEDICINE

## 2024-02-02 PROCEDURE — G0439 PPPS, SUBSEQ VISIT: HCPCS | Performed by: FAMILY MEDICINE

## 2024-02-02 PROCEDURE — 1160F RVW MEDS BY RX/DR IN RCRD: CPT | Performed by: FAMILY MEDICINE

## 2024-02-02 PROCEDURE — 99397 PER PM REEVAL EST PAT 65+ YR: CPT | Performed by: FAMILY MEDICINE

## 2024-02-02 PROCEDURE — 1036F TOBACCO NON-USER: CPT | Performed by: FAMILY MEDICINE

## 2024-02-02 PROCEDURE — 3074F SYST BP LT 130 MM HG: CPT | Performed by: FAMILY MEDICINE

## 2024-02-02 PROCEDURE — 1170F FXNL STATUS ASSESSED: CPT | Performed by: FAMILY MEDICINE

## 2024-02-02 PROCEDURE — 99214 OFFICE O/P EST MOD 30 MIN: CPT | Performed by: FAMILY MEDICINE

## 2024-02-02 RX ORDER — BISMUTH SUBSALICYLATE 262 MG
1 TABLET,CHEWABLE ORAL DAILY
COMMUNITY

## 2024-02-02 RX ORDER — LEVOTHYROXINE SODIUM 50 UG/1
50 TABLET ORAL DAILY
Qty: 90 TABLET | Refills: 3 | Status: SHIPPED | OUTPATIENT
Start: 2024-02-02

## 2024-02-02 ASSESSMENT — ENCOUNTER SYMPTOMS
DEPRESSION: 0
OCCASIONAL FEELINGS OF UNSTEADINESS: 0
LOSS OF SENSATION IN FEET: 0

## 2024-02-02 ASSESSMENT — ACTIVITIES OF DAILY LIVING (ADL)
DOING_HOUSEWORK: INDEPENDENT
TAKING_MEDICATION: INDEPENDENT
GROCERY_SHOPPING: INDEPENDENT
MANAGING_FINANCES: INDEPENDENT
BATHING: INDEPENDENT
DRESSING: INDEPENDENT

## 2024-02-02 NOTE — ASSESSMENT & PLAN NOTE
Medical Wellness exam done.  Prevnar 13 3/16  Pneumovax 23 10/14  Tetanus 10/14  Mammogram 2/22  DEXA 2/22 - ordered

## 2024-02-02 NOTE — PROGRESS NOTES
Subjective :  Chief Complaint: Tania Guerrero is an 83 y.o. female here for an annual Medicare Wellness visit, annual physical, and follow up labs and chronic conditions.      Patient otherwise feels well. No other complaints or concerns.    I have reviewed and reconciled the medication list with the patient today.    Current Outpatient Medications:     acetaminophen (Tylenol) 500 mg tablet, TAKE 2 TABLETS BY MOUTH EVERY 6 HOURS, Disp: 56 tablet, Rfl: 0    albuterol (Ventolin HFA) 90 mcg/actuation inhaler, Inhale 1 puff every 4 hours if needed for wheezing or shortness of breath., Disp: , Rfl:     amLODIPine (Norvasc) 2.5 mg tablet, Take 1 tablet (2.5 mg) by mouth once daily., Disp: 90 tablet, Rfl: 3    aspirin 81 mg EC tablet, Take 1 tablet (81 mg) by mouth once daily., Disp: , Rfl:     cholecalciferol (Vitamin D-3) 50 mcg (2,000 unit) capsule, Take by mouth., Disp: , Rfl:     Cinnamon 500 mg capsule, Take by mouth., Disp: , Rfl:     fish oil concentrate (Omega-3) 120-180 mg capsule, Take 1 capsule (1 g) by mouth once daily., Disp: , Rfl:     TEO OIL ORAL, Take 1 capsule by mouth once daily., Disp: , Rfl:     latanoprost (Xalatan) 0.005 % ophthalmic solution, Administer into affected eye(s)., Disp: , Rfl:     losartan-hydrochlorothiazide (Hyzaar) 100-25 mg tablet, Take 1 tablet by mouth once daily., Disp: 90 tablet, Rfl: 1    metoprolol succinate XL (Toprol-XL) 100 mg 24 hr tablet, Take 1 tablet (100 mg) by mouth once daily., Disp: 90 tablet, Rfl: 3    multivitamin tablet, Take 1 tablet by mouth once daily., Disp: , Rfl:     potassium gluconate 595 mg (99 mg) tablet, Take by mouth., Disp: , Rfl:     simvastatin (Zocor) 10 mg tablet, Take 0.5 tablets (5 mg) by mouth once daily at bedtime., Disp: 45 tablet, Rfl: 3    ubidecarenone (coenzyme Q10) 100 mg tablet, Take by mouth., Disp: , Rfl:     fluticasone-umeclidin-vilanter (TRELEGY-ELLIPTA) 200-62.5-25 mcg blister with device, Inhale 1 puff once daily in the  morning. Take before meals., Disp: 9 each, Rfl: 3    levothyroxine (Synthroid, Levoxyl) 50 mcg tablet, Take 1 tablet (50 mcg) by mouth once daily., Disp: 90 tablet, Rfl: 3    The patient's relevant past medical, surgical, family and social history was reviewed in [facility].  All pertinent lab work and results for this visit were reviewed with patient.    No visits with results within 6 Week(s) from this visit.   Latest known visit with results is:   Lab on 12/11/2023   Component Date Value Ref Range Status    LDL, Direct 12/11/2023 56  0 - 129 mg/dL Final    Cholesterol 12/11/2023 125  0 - 199 mg/dL Final          Age      Desirable   Borderline High   High     0-19 Y     0 - 169       170 - 199     >/= 200    20-24 Y     0 - 189       190 - 224     >/= 225         >24 Y     0 - 199       200 - 239     >/= 240   **All ranges are based on fasting samples. Specific   therapeutic targets will vary based on patient-specific   cardiac risk.    Pediatric guidelines reference:Pediatrics 2011, 128(S5).Adult guidelines reference: NCEP ATPIII Guidelines,SUE 2001, 258:2486-97    Venipuncture immediately after or during the administration of Metamizole may lead to falsely low results. Testing should be performed immediately prior to Metamizole dosing.    HDL-Cholesterol 12/11/2023 59.9  mg/dL Final      Age       Very Low   Low     Normal    High    0-19 Y    < 35      < 40     40-45     ----  20-24 Y    ----     < 40      >45      ----        >24 Y      ----     < 40     40-60      >60      Cholesterol/HDL Ratio 12/11/2023 2.1   Final      Ref Values  Desirable  < 3.4  High Risk  > 5.0    LDL Calculated 12/11/2023 44  <=99 mg/dL Final                                Near   Borderline      AGE      Desirable  Optimal    High     High     Very High     0-19 Y     0 - 109     ---    110-129   >/= 130     ----    20-24 Y     0 - 119     ---    120-159   >/= 160     ----      >24 Y     0 -  99   100-129  130-159   160-189     >/=190       VLDL 12/11/2023 21  0 - 40 mg/dL Final    Triglycerides 12/11/2023 105  0 - 149 mg/dL Final       Age         Desirable   Borderline High   High     Very High   0 D-90 D    19 - 174         ----         ----        ----  91 D- 9 Y     0 -  74        75 -  99     >/= 100      ----    10-19 Y     0 -  89        90 - 129     >/= 130      ----    20-24 Y     0 - 114       115 - 149     >/= 150      ----         >24 Y     0 - 149       150 - 199    200- 499    >/= 500    Venipuncture immediately after or during the administration of Metamizole may lead to falsely low results. Testing should be performed immediately prior to Metamizole dosing.    Non HDL Cholesterol 12/11/2023 65  0 - 149 mg/dL Final          Age       Desirable   Borderline High   High     Very High     0-19 Y     0 - 119       120 - 144     >/= 145    >/= 160    20-24 Y     0 - 149       150 - 189     >/= 190      ----         >24 Y    30 mg/dL above LDL Cholesterol goal      Glucose 12/11/2023 101 (H)  74 - 99 mg/dL Final    Sodium 12/11/2023 142  136 - 145 mmol/L Final    Potassium 12/11/2023 3.7  3.5 - 5.3 mmol/L Final    Chloride 12/11/2023 103  98 - 107 mmol/L Final    Bicarbonate 12/11/2023 28  21 - 32 mmol/L Final    Anion Gap 12/11/2023 15  10 - 20 mmol/L Final    Urea Nitrogen 12/11/2023 18  6 - 23 mg/dL Final    Creatinine 12/11/2023 0.94  0.50 - 1.05 mg/dL Final    eGFR 12/11/2023 61  >60 mL/min/1.73m*2 Final    Calculations of estimated GFR are performed using the 2021 CKD-EPI Study Refit equation without the race variable for the IDMS-Traceable creatinine methods.  https://jasn.asnjournals.org/content/early/2021/09/22/ASN.8266575313    Calcium 12/11/2023 8.9  8.6 - 10.3 mg/dL Final    Albumin 12/11/2023 4.0  3.4 - 5.0 g/dL Final    Alkaline Phosphatase 12/11/2023 66  33 - 136 U/L Final    Total Protein 12/11/2023 6.5  6.4 - 8.2 g/dL Final    AST 12/11/2023 16  9 - 39 U/L Final    Bilirubin, Total 12/11/2023 0.7  0.0 - 1.2 mg/dL Final     "ALT 12/11/2023 34  7 - 45 U/L Final    Patients treated with Sulfasalazine may generate falsely decreased results for ALT.    Thyroid Stimulating Hormone 12/11/2023 1.52  0.44 - 3.98 mIU/L Final    Hemoglobin A1C 12/11/2023 6.6 (H)  see below % Final    Estimated Average Glucose 12/11/2023 143  Not Established mg/dL Final         Review of Systems   A complete review of systems was performed and all systems were normal except what is noted in the HPI.      List of current healthcare providers:  Patient Care Team:  Sue Murray MD as PCP - General  Sue Murray MD as PCP - [employer] PCP                      Advance Care Planning:    Living Will: Yes  POA: Yes    Objective :  /75   Pulse 70   Temp 36.7 °C (98 °F)   Ht 1.575 m (5' 2\")   Wt 79 kg (174 lb 3.2 oz)   SpO2 95%   BMI 31.86 kg/m²    No results found.  Physical Exam  Constitutional:       Appearance: Normal appearance.   HENT:      Head: Normocephalic and atraumatic.   Neck:      Vascular: No carotid bruit.   Cardiovascular:      Rate and Rhythm: Normal rate and regular rhythm.      Heart sounds: Normal heart sounds.   Pulmonary:      Effort: Pulmonary effort is normal.      Breath sounds: Normal breath sounds. No wheezing, rhonchi or rales.   Abdominal:      General: Abdomen is flat. Bowel sounds are normal.      Palpations: Abdomen is soft.      Tenderness: There is no abdominal tenderness. There is no guarding.   Musculoskeletal:         General: Normal range of motion.      Right lower leg: No edema.      Left lower leg: No edema.   Skin:     General: Skin is dry.   Neurological:      General: No focal deficit present.      Mental Status: She is alert and oriented to person, place, and time.   Psychiatric:         Mood and Affect: Mood normal.         Behavior: Behavior normal.         Thought Content: Thought content normal.         Assessment/Plan :  Problem List Items Addressed This Visit       Hypothyroid    " Relevant Medications    levothyroxine (Synthroid, Levoxyl) 50 mcg tablet    Chronic kidney disease, stage 3a (CMS/[facility])    Diet-controlled type 2 diabetes mellitus (CMS/[facility])    Hyperlipidemia    Hypertension    Medicare annual wellness visit, subsequent - Primary    COPD (chronic obstructive pulmonary disease) (CMS/[facility])    Relevant Medications    fluticasone-umeclidin-vilanter (TRELEGY-ELLIPTA) 200-62.5-25 mcg blister with device     Other Visit Diagnoses       Annual physical exam        Yearly physical done    Asymptomatic menopause        Relevant Orders    XR DEXA bone density               The following health maintenance schedule was reviewed with the patient and provided in printed form in the after visit summary:  Health Maintenance   Topic Date Due    COVID-19 Vaccine (1) Never done    Diabetes: Foot Exam  Never done    Diabetes: Retinopathy Screening  Never done    Hepatitis A Vaccines (1 of 2 - Risk 2-dose series) Never done    Hepatitis B Vaccines (1 of 3 - Risk 3-dose series) Never done    Zoster Vaccines (2 of 3) 12/13/2018    Influenza Vaccine (1) 09/01/2023    Bone Density Scan  02/02/2024    Diabetes: Hemoglobin A1C  03/11/2024    Diabetes: Urine Protein Screening  06/02/2024    Medicare Annual Wellness Visit (AWV)  06/13/2024    DTaP/Tdap/Td Vaccines (2 - Td or Tdap) 10/23/2024    TSH Level  12/11/2024    Lipid Panel  12/11/2024    Pneumococcal Vaccine: 65+ Years  Completed    HIB Vaccines  Aged Out    IPV Vaccines  Aged Out    Meningococcal Vaccine  Aged Out    Rotavirus Vaccines  Aged Out    HPV Vaccines  Aged Out           Patient understands and agrees with treatment plan.          Sue Murray MD

## 2024-02-02 NOTE — PATIENT INSTRUCTIONS
I recommend RSV vaccine, Shingrix, and new COVID booster at the pharmacy.    I would like you to follow up in 6 months  Please have all labs that were ordered done at least 1 week prior to your visit.    Recommend healthy diet based around fruits, vegetables, and lean proteins such as chicken, turkey, fish, and beans.  Also include moderate portions of healthy carbohydrates such as wheat bread and pasta, sweet potatoes. Limit sweets and alcoholic beverages. Try not drink more than 100 calories in beverages daily.   It is important to get a protein-rich breakfast daily such as oatmeal, eggs or Greek yogurt.  Increase activity as able to a recommended goal of at least 30 minutes of cardiovascular exercise (walking, swimming, biking, jogging etc.) at least 5 days weekly and a goal of 45 minutes or more most days of the week for weight loss. This exercise can be done all at one time or broken up into 2 or more sessions throughout the day.

## 2024-02-02 NOTE — ASSESSMENT & PLAN NOTE
Stop symbicort due to formulary change  Start trelegy as directed - Risks, benefits and side effects reviewed with patient.   Recheck 6 months

## 2024-02-06 ENCOUNTER — OFFICE VISIT (OUTPATIENT)
Dept: UROLOGY | Facility: CLINIC | Age: 84
End: 2024-02-06
Payer: MEDICARE

## 2024-02-06 DIAGNOSIS — N81.11 MIDLINE CYSTOCELE: Primary | ICD-10-CM

## 2024-02-06 PROCEDURE — 1123F ACP DISCUSS/DSCN MKR DOCD: CPT | Performed by: STUDENT IN AN ORGANIZED HEALTH CARE EDUCATION/TRAINING PROGRAM

## 2024-02-06 PROCEDURE — 1159F MED LIST DOCD IN RCRD: CPT | Performed by: STUDENT IN AN ORGANIZED HEALTH CARE EDUCATION/TRAINING PROGRAM

## 2024-02-06 PROCEDURE — 1036F TOBACCO NON-USER: CPT | Performed by: STUDENT IN AN ORGANIZED HEALTH CARE EDUCATION/TRAINING PROGRAM

## 2024-02-06 PROCEDURE — 99213 OFFICE O/P EST LOW 20 MIN: CPT | Performed by: STUDENT IN AN ORGANIZED HEALTH CARE EDUCATION/TRAINING PROGRAM

## 2024-02-06 PROCEDURE — 1160F RVW MEDS BY RX/DR IN RCRD: CPT | Performed by: STUDENT IN AN ORGANIZED HEALTH CARE EDUCATION/TRAINING PROGRAM

## 2024-02-06 NOTE — PROGRESS NOTES
"         HISTORY OF PRESENT ILLNESS:  Tania Guerrero is a 82 y/o female presenting on 2/6/24 for a 5 month post-op visit. She is doing well with no issues.        Past Medical History  She has a past medical history of Disorder of kidney and ureter, unspecified (06/03/2021), Diverticulitis of intestine, part unspecified, without perforation or abscess without bleeding, Hydronephrosis, left (06/08/2023), Medullary cystic kidney, Old myocardial infarction, Other conditions influencing health status, Personal history of other diseases of the respiratory system (06/24/2019), Personal history of other infectious and parasitic diseases, Personal history of other specified conditions (06/11/2018), Personal history of urinary calculi, and Unspecified hydronephrosis (05/20/2021).    Surgical History  She has a past surgical history that includes Lithotripsy (05/16/2016); Other surgical history (06/24/2019); Other surgical history (12/02/2019); Other surgical history (12/02/2019); and Other surgical history (12/02/2019).     Social History  She reports that she has never smoked. She has never used smokeless tobacco. She reports that she does not currently use alcohol. She reports that she does not use drugs.    Family History  Family History   Problem Relation Name Age of Onset    No Known Problems Father          Allergies  Levofloxacin, Nickel, Penicillins, and Sulfa (sulfonamide antibiotics)      A comprehensive 10+ review of systems was negative except for: see hpi                          PHYSICAL EXAMINATION:  BP Readings from Last 3 Encounters:   02/02/24 114/75   11/27/23 130/88   06/12/23 124/83      Wt Readings from Last 3 Encounters:   02/02/24 79 kg (174 lb 3.2 oz)   11/27/23 72.6 kg (160 lb)   06/12/23 78.3 kg (172 lb 9.6 oz)      BMI: Estimated body mass index is 31.86 kg/m² as calculated from the following:    Height as of 2/2/24: 1.575 m (5' 2\").    Weight as of 2/2/24: 79 kg (174 lb 3.2 oz).  BSA: " "Estimated body surface area is 1.86 meters squared as calculated from the following:    Height as of 2/2/24: 1.575 m (5' 2\").    Weight as of 2/2/24: 79 kg (174 lb 3.2 oz).  HEENT: Normocephalic, atraumatic, PER EOMI, nonicteric, trachea normal, thyroid normal, oropharynx normal.  CARDIAC: regular rate & rhythm, S1 & S2 normal.  No heaves, thrills, gallops or murmurs.  LUNGS: Clear to auscultation, no spinal or CV tenderness.  EXTREMITIES: No evidence of cyanosis, clubbing or edema.               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 Reginald Dangelo.         "

## 2024-03-11 ENCOUNTER — HOSPITAL ENCOUNTER (OUTPATIENT)
Dept: RADIOLOGY | Facility: CLINIC | Age: 84
Discharge: HOME | End: 2024-03-11
Payer: MEDICARE

## 2024-03-11 DIAGNOSIS — Z78.0 ASYMPTOMATIC MENOPAUSE: ICD-10-CM

## 2024-03-11 PROCEDURE — 77080 DXA BONE DENSITY AXIAL: CPT | Performed by: RADIOLOGY

## 2024-03-11 PROCEDURE — 77080 DXA BONE DENSITY AXIAL: CPT

## 2024-03-11 NOTE — LETTER
March 26, 2024     Tania Guerrero  [address] [facility] [id number]      Dear Ms. Guerrero:    Below are the results from your recent visit:    Resulted Orders   XR DEXA bone density    Narrative    Interpreted By:  Anup Murphy,   STUDY:  DEXA BONE DENSITY3/11/2024 11:47 am      INDICATION:  Signs/Symptoms:screen. The patient is a 83 y/o  year old F.      COMPARISON:  02/02/2022      ACCESSION NUMBER(S):  [id number]      ORDERING CLINICIAN:  EULALIO PICKETT      TECHNIQUE:  DEXA BONE DENSITY      FINDINGS:  SPINE L1-L4  Bone Mineral Density: 1.026  T-Score -0.2  Z-Score 2.6  Bone Mineral Density change vs baseline (%):  -1.3  Bone Mineral Density change vs previous (%): -1.3      LEFT FEMUR -TOTAL  Bone Mineral Density: 0.779  T-Score -1.3   Z-Score  0.8  Bone Mineral Density change vs baseline (%): Not reported  Bone Mineral Density change vs previous (%): Not reported      LEFT FEMUR -NECK  Bone Mineral Density: 0.632  T-Score -2  Z-Score 0.4          World Health Organization (WHO) criteria for post-menopausal,   Women:  Normal:         T-score at or above -1 SD  Osteopenia:   T-score between -1 and -2.5 SD  Osteoporosis: T-score at or below -2.5 SD          10-year Fracture Risk (%):  Major Osteoporotic Fracture  15  Hip Fracture                        4.3      Note:  If no FRAX score is reported, it is because:  Some T-score for Spine Total or Hip Total or Femoral Neck at or below  -2.5      This exam was performed at [facility] on a [facility]  [facility] C Dexa Unit.        Impression    DEXA:  According to World Health Organization criteria,  classification is low bone mass (osteopenia)      Followup recommended in two years or sooner as clinically warranted.      All images and detailed analysis are available on the  Radiology  PACS.      MACRO:  None      Signed by: Anup Murphy 3/11/2024 12:17 PM  Dictation workstation:   [id number]           The test results show the  following:   Recent bone density test shows osteopenia or mild thinning of the bones.   Recommend vitamin D 4540-0613 international units daily, calcium 8478-6108 mg daily and increased weight-bearing exercise such as walking.   There are prescription medications that help prevent further bone loss - if patient is interested can make virtual or in-office appointment  when able.   Will recheck scan in 2 years.       If you have any questions or concerns, please don't hesitate to call.         Sincerely,        Sue Murray MD

## 2024-03-12 ENCOUNTER — TELEPHONE (OUTPATIENT)
Dept: PRIMARY CARE | Facility: CLINIC | Age: 84
End: 2024-03-12
Payer: MEDICARE

## 2024-03-12 NOTE — TELEPHONE ENCOUNTER
Patient called in and stated that she is not able to take calcium due to her Kidney Stones. The patient would like to know can she take magnesium to help with her bones?        Interface, Radiology Results In routed this conversation to MD Sue Napier MD   to Do [place] [phone] Clinical Support Staff       3/11/24 12:19 PM  Notify patient recent bone density test shows osteopenia or mild thinning of the bones.  Recommend vitamin D 5073-1688 international units daily, calcium 7123-2390 mg daily and increased weight-bearing exercise such as walking.  There are prescription medications that help prevent further bone loss - if patient is interested can make virtual or in-office appointment with me or Genevieve Pierre when able.  Will recheck scan in 2 years.  Ulysses Rangel MA   to [facility] [place] [phone] Clinical Support Staff       3/11/24  6:21 PM  Left patient voicemail to call back regarding DEXA results  This encounter is not signed. The conversation may still be ongoing.

## 2024-04-21 ENCOUNTER — ANESTHESIA EVENT (OUTPATIENT)
Dept: OPERATING ROOM | Facility: HOSPITAL | Age: 84
DRG: 354 | End: 2024-04-21
Payer: MEDICARE

## 2024-04-21 ENCOUNTER — ANESTHESIA (OUTPATIENT)
Dept: RADIOLOGY | Facility: HOSPITAL | Age: 84
DRG: 354 | End: 2024-04-21
Payer: MEDICARE

## 2024-04-21 ENCOUNTER — APPOINTMENT (OUTPATIENT)
Dept: RADIOLOGY | Facility: HOSPITAL | Age: 84
DRG: 354 | End: 2024-04-21
Payer: MEDICARE

## 2024-04-21 ENCOUNTER — ANESTHESIA (OUTPATIENT)
Dept: OPERATING ROOM | Facility: HOSPITAL | Age: 84
DRG: 354 | End: 2024-04-21
Payer: MEDICARE

## 2024-04-21 ENCOUNTER — HOSPITAL ENCOUNTER (OUTPATIENT)
Facility: HOSPITAL | Age: 84
Setting detail: OBSERVATION
Discharge: HOME | DRG: 354 | End: 2024-04-22
Attending: EMERGENCY MEDICINE | Admitting: STUDENT IN AN ORGANIZED HEALTH CARE EDUCATION/TRAINING PROGRAM
Payer: MEDICARE

## 2024-04-21 ENCOUNTER — ANESTHESIA EVENT (OUTPATIENT)
Dept: RADIOLOGY | Facility: HOSPITAL | Age: 84
DRG: 354 | End: 2024-04-21
Payer: MEDICARE

## 2024-04-21 DIAGNOSIS — E87.6 HYPOKALEMIA: ICD-10-CM

## 2024-04-21 DIAGNOSIS — K43.6 IRREDUCIBLE SPIGELIAN HERNIA: ICD-10-CM

## 2024-04-21 DIAGNOSIS — K46.0 INCARCERATED HERNIA: Primary | ICD-10-CM

## 2024-04-21 DIAGNOSIS — N39.0 URINARY TRACT INFECTION WITHOUT HEMATURIA, SITE UNSPECIFIED: ICD-10-CM

## 2024-04-21 PROBLEM — R73.9 HYPERGLYCEMIA: Status: ACTIVE | Noted: 2024-04-21

## 2024-04-21 PROBLEM — R00.0 TACHYCARDIA: Status: ACTIVE | Noted: 2024-04-21

## 2024-04-21 PROBLEM — Z98.890 PONV (POSTOPERATIVE NAUSEA AND VOMITING): Status: ACTIVE | Noted: 2024-04-21

## 2024-04-21 PROBLEM — R11.2 PONV (POSTOPERATIVE NAUSEA AND VOMITING): Status: ACTIVE | Noted: 2024-04-21

## 2024-04-21 LAB
ALBUMIN SERPL BCP-MCNC: 4.1 G/DL (ref 3.4–5)
ALP SERPL-CCNC: 80 U/L (ref 33–136)
ALT SERPL W P-5'-P-CCNC: 34 U/L (ref 7–45)
ANION GAP SERPL CALC-SCNC: 15 MMOL/L (ref 10–20)
APPEARANCE UR: ABNORMAL
AST SERPL W P-5'-P-CCNC: 16 U/L (ref 9–39)
BACTERIA #/AREA URNS AUTO: ABNORMAL /HPF
BASOPHILS # BLD AUTO: 0.05 X10*3/UL (ref 0–0.1)
BASOPHILS NFR BLD AUTO: 0.6 %
BILIRUB SERPL-MCNC: 0.7 MG/DL (ref 0–1.2)
BILIRUB UR STRIP.AUTO-MCNC: NEGATIVE MG/DL
BUN SERPL-MCNC: 20 MG/DL (ref 6–23)
C DIF TOX TCDA+TCDB STL QL NAA+PROBE: NOT DETECTED
CALCIUM SERPL-MCNC: 9.3 MG/DL (ref 8.6–10.3)
CHLORIDE SERPL-SCNC: 102 MMOL/L (ref 98–107)
CO2 SERPL-SCNC: 24 MMOL/L (ref 21–32)
COLOR UR: YELLOW
CREAT SERPL-MCNC: 1.25 MG/DL (ref 0.5–1.05)
EGFRCR SERPLBLD CKD-EPI 2021: 43 ML/MIN/1.73M*2
EOSINOPHIL # BLD AUTO: 0.08 X10*3/UL (ref 0–0.4)
EOSINOPHIL NFR BLD AUTO: 0.9 %
ERYTHROCYTE [DISTWIDTH] IN BLOOD BY AUTOMATED COUNT: 12.9 % (ref 11.5–14.5)
GLUCOSE BLD MANUAL STRIP-MCNC: 139 MG/DL (ref 74–99)
GLUCOSE SERPL-MCNC: 172 MG/DL (ref 74–99)
GLUCOSE UR STRIP.AUTO-MCNC: NEGATIVE MG/DL
HCT VFR BLD AUTO: 46.9 % (ref 36–46)
HEMOCCULT STL QL IA: POSITIVE
HGB BLD-MCNC: 16.1 G/DL (ref 12–16)
HYALINE CASTS #/AREA URNS AUTO: ABNORMAL /LPF
IMM GRANULOCYTES # BLD AUTO: 0.02 X10*3/UL (ref 0–0.5)
IMM GRANULOCYTES NFR BLD AUTO: 0.2 % (ref 0–0.9)
KETONES UR STRIP.AUTO-MCNC: NEGATIVE MG/DL
LACTATE SERPL-SCNC: 1.1 MMOL/L (ref 0.4–2)
LEUKOCYTE ESTERASE UR QL STRIP.AUTO: ABNORMAL
LIPASE SERPL-CCNC: 29 U/L (ref 9–82)
LYMPHOCYTES # BLD AUTO: 1.87 X10*3/UL (ref 0.8–3)
LYMPHOCYTES NFR BLD AUTO: 21.3 %
MAGNESIUM SERPL-MCNC: 1.71 MG/DL (ref 1.6–2.4)
MCH RBC QN AUTO: 31.5 PG (ref 26–34)
MCHC RBC AUTO-ENTMCNC: 34.3 G/DL (ref 32–36)
MCV RBC AUTO: 92 FL (ref 80–100)
MONOCYTES # BLD AUTO: 0.6 X10*3/UL (ref 0.05–0.8)
MONOCYTES NFR BLD AUTO: 6.8 %
MUCOUS THREADS #/AREA URNS AUTO: ABNORMAL /LPF
NEUTROPHILS # BLD AUTO: 6.16 X10*3/UL (ref 1.6–5.5)
NEUTROPHILS NFR BLD AUTO: 70.2 %
NITRITE UR QL STRIP.AUTO: POSITIVE
NRBC BLD-RTO: 0 /100 WBCS (ref 0–0)
PH UR STRIP.AUTO: 5 [PH]
PLATELET # BLD AUTO: 372 X10*3/UL (ref 150–450)
POTASSIUM SERPL-SCNC: 3.1 MMOL/L (ref 3.5–5.3)
PROT SERPL-MCNC: 7.2 G/DL (ref 6.4–8.2)
PROT UR STRIP.AUTO-MCNC: ABNORMAL MG/DL
RBC # BLD AUTO: 5.11 X10*6/UL (ref 4–5.2)
RBC # UR STRIP.AUTO: NEGATIVE /UL
RBC #/AREA URNS AUTO: ABNORMAL /HPF
SODIUM SERPL-SCNC: 138 MMOL/L (ref 136–145)
SP GR UR STRIP.AUTO: 1.04
UROBILINOGEN UR STRIP.AUTO-MCNC: <2 MG/DL
WBC # BLD AUTO: 8.8 X10*3/UL (ref 4.4–11.3)
WBC #/AREA URNS AUTO: >50 /HPF

## 2024-04-21 PROCEDURE — 2060000001 HC INTERMEDIATE ICU ROOM DAILY

## 2024-04-21 PROCEDURE — 2780000003 HC OR 278 NO HCPCS: Performed by: SURGERY

## 2024-04-21 PROCEDURE — 74177 CT ABD & PELVIS W/CONTRAST: CPT | Performed by: RADIOLOGY

## 2024-04-21 PROCEDURE — 80053 COMPREHEN METABOLIC PANEL: CPT | Performed by: PHYSICIAN ASSISTANT

## 2024-04-21 PROCEDURE — 3700000002 HC GENERAL ANESTHESIA TIME - EACH INCREMENTAL 1 MINUTE: Performed by: SURGERY

## 2024-04-21 PROCEDURE — 2500000004 HC RX 250 GENERAL PHARMACY W/ HCPCS (ALT 636 FOR OP/ED): Performed by: SURGERY

## 2024-04-21 PROCEDURE — 2550000001 HC RX 255 CONTRASTS: Performed by: PHYSICIAN ASSISTANT

## 2024-04-21 PROCEDURE — 2500000005 HC RX 250 GENERAL PHARMACY W/O HCPCS: Performed by: LICENSED PRACTICAL NURSE

## 2024-04-21 PROCEDURE — 87493 C DIFF AMPLIFIED PROBE: CPT | Performed by: PHYSICIAN ASSISTANT

## 2024-04-21 PROCEDURE — 82947 ASSAY GLUCOSE BLOOD QUANT: CPT

## 2024-04-21 PROCEDURE — 87086 URINE CULTURE/COLONY COUNT: CPT | Mod: PORLAB | Performed by: PHYSICIAN ASSISTANT

## 2024-04-21 PROCEDURE — 99223 1ST HOSP IP/OBS HIGH 75: CPT | Performed by: STUDENT IN AN ORGANIZED HEALTH CARE EDUCATION/TRAINING PROGRAM

## 2024-04-21 PROCEDURE — 3700000001 HC GENERAL ANESTHESIA TIME - INITIAL BASE CHARGE: Performed by: SURGERY

## 2024-04-21 PROCEDURE — G0378 HOSPITAL OBSERVATION PER HR: HCPCS

## 2024-04-21 PROCEDURE — 7100000001 HC RECOVERY ROOM TIME - INITIAL BASE CHARGE: Performed by: SURGERY

## 2024-04-21 PROCEDURE — 87329 GIARDIA AG IA: CPT | Performed by: PHYSICIAN ASSISTANT

## 2024-04-21 PROCEDURE — 88302 TISSUE EXAM BY PATHOLOGIST: CPT | Performed by: PATHOLOGY

## 2024-04-21 PROCEDURE — 85025 COMPLETE CBC W/AUTO DIFF WBC: CPT | Performed by: PHYSICIAN ASSISTANT

## 2024-04-21 PROCEDURE — 96368 THER/DIAG CONCURRENT INF: CPT | Mod: 59

## 2024-04-21 PROCEDURE — 2500000001 HC RX 250 WO HCPCS SELF ADMINISTERED DRUGS (ALT 637 FOR MEDICARE OP): Performed by: STUDENT IN AN ORGANIZED HEALTH CARE EDUCATION/TRAINING PROGRAM

## 2024-04-21 PROCEDURE — 2500000004 HC RX 250 GENERAL PHARMACY W/ HCPCS (ALT 636 FOR OP/ED): Performed by: PHYSICIAN ASSISTANT

## 2024-04-21 PROCEDURE — 36415 COLL VENOUS BLD VENIPUNCTURE: CPT | Performed by: PHYSICIAN ASSISTANT

## 2024-04-21 PROCEDURE — 87506 IADNA-DNA/RNA PROBE TQ 6-11: CPT | Mod: PORLAB | Performed by: PHYSICIAN ASSISTANT

## 2024-04-21 PROCEDURE — 0751T DGTZ GLS MCRSCP SLD LEVEL II: CPT | Mod: TC,PORLAB | Performed by: SURGERY

## 2024-04-21 PROCEDURE — 2500000004 HC RX 250 GENERAL PHARMACY W/ HCPCS (ALT 636 FOR OP/ED): Performed by: LICENSED PRACTICAL NURSE

## 2024-04-21 PROCEDURE — 83735 ASSAY OF MAGNESIUM: CPT | Performed by: PHYSICIAN ASSISTANT

## 2024-04-21 PROCEDURE — 82274 ASSAY TEST FOR BLOOD FECAL: CPT | Performed by: PHYSICIAN ASSISTANT

## 2024-04-21 PROCEDURE — 83605 ASSAY OF LACTIC ACID: CPT | Performed by: PHYSICIAN ASSISTANT

## 2024-04-21 PROCEDURE — 3600000008 HC OR TIME - EACH INCREMENTAL 1 MINUTE - PROCEDURE LEVEL THREE: Performed by: SURGERY

## 2024-04-21 PROCEDURE — 99291 CRITICAL CARE FIRST HOUR: CPT | Performed by: PHYSICIAN ASSISTANT

## 2024-04-21 PROCEDURE — 87328 CRYPTOSPORIDIUM AG IA: CPT | Performed by: PHYSICIAN ASSISTANT

## 2024-04-21 PROCEDURE — 74177 CT ABD & PELVIS W/CONTRAST: CPT

## 2024-04-21 PROCEDURE — 2500000004 HC RX 250 GENERAL PHARMACY W/ HCPCS (ALT 636 FOR OP/ED): Performed by: STUDENT IN AN ORGANIZED HEALTH CARE EDUCATION/TRAINING PROGRAM

## 2024-04-21 PROCEDURE — 96365 THER/PROPH/DIAG IV INF INIT: CPT | Mod: 59

## 2024-04-21 PROCEDURE — 83690 ASSAY OF LIPASE: CPT | Performed by: PHYSICIAN ASSISTANT

## 2024-04-21 PROCEDURE — 7100000002 HC RECOVERY ROOM TIME - EACH INCREMENTAL 1 MINUTE: Performed by: SURGERY

## 2024-04-21 PROCEDURE — 3600000003 HC OR TIME - INITIAL BASE CHARGE - PROCEDURE LEVEL THREE: Performed by: SURGERY

## 2024-04-21 PROCEDURE — 96367 TX/PROPH/DG ADDL SEQ IV INF: CPT | Mod: 59

## 2024-04-21 PROCEDURE — 81001 URINALYSIS AUTO W/SCOPE: CPT | Performed by: PHYSICIAN ASSISTANT

## 2024-04-21 PROCEDURE — 96361 HYDRATE IV INFUSION ADD-ON: CPT | Mod: 59

## 2024-04-21 RX ORDER — ENOXAPARIN SODIUM 100 MG/ML
40 INJECTION SUBCUTANEOUS EVERY 24 HOURS
Status: DISCONTINUED | OUTPATIENT
Start: 2024-04-21 | End: 2024-04-22 | Stop reason: HOSPADM

## 2024-04-21 RX ORDER — DEXTROSE 50 % IN WATER (D50W) INTRAVENOUS SYRINGE
12.5
Status: DISCONTINUED | OUTPATIENT
Start: 2024-04-21 | End: 2024-04-22 | Stop reason: HOSPADM

## 2024-04-21 RX ORDER — METOPROLOL SUCCINATE 50 MG/1
100 TABLET, EXTENDED RELEASE ORAL DAILY
Status: DISCONTINUED | OUTPATIENT
Start: 2024-04-21 | End: 2024-04-22 | Stop reason: HOSPADM

## 2024-04-21 RX ORDER — CLINDAMYCIN PHOSPHATE 600 MG/50ML
600 INJECTION, SOLUTION INTRAVENOUS ONCE
Status: COMPLETED | OUTPATIENT
Start: 2024-04-21 | End: 2024-04-21

## 2024-04-21 RX ORDER — LIDOCAINE HYDROCHLORIDE 10 MG/ML
0.1 INJECTION, SOLUTION EPIDURAL; INFILTRATION; INTRACAUDAL; PERINEURAL ONCE
Status: DISCONTINUED | OUTPATIENT
Start: 2024-04-21 | End: 2024-04-21 | Stop reason: HOSPADM

## 2024-04-21 RX ORDER — CEFTRIAXONE 1 G/50ML
1 INJECTION, SOLUTION INTRAVENOUS ONCE
Status: COMPLETED | OUTPATIENT
Start: 2024-04-21 | End: 2024-04-21

## 2024-04-21 RX ORDER — ONDANSETRON HYDROCHLORIDE 2 MG/ML
INJECTION, SOLUTION INTRAVENOUS AS NEEDED
Status: DISCONTINUED | OUTPATIENT
Start: 2024-04-21 | End: 2024-04-21

## 2024-04-21 RX ORDER — MEPERIDINE HYDROCHLORIDE 25 MG/ML
12.5 INJECTION INTRAMUSCULAR; INTRAVENOUS; SUBCUTANEOUS EVERY 10 MIN PRN
Status: DISCONTINUED | OUTPATIENT
Start: 2024-04-21 | End: 2024-04-21 | Stop reason: HOSPADM

## 2024-04-21 RX ORDER — MORPHINE SULFATE 2 MG/ML
2 INJECTION, SOLUTION INTRAMUSCULAR; INTRAVENOUS EVERY 5 MIN PRN
Status: DISCONTINUED | OUTPATIENT
Start: 2024-04-21 | End: 2024-04-21 | Stop reason: HOSPADM

## 2024-04-21 RX ORDER — HYDROCHLOROTHIAZIDE 25 MG/1
25 TABLET ORAL DAILY
Status: DISCONTINUED | OUTPATIENT
Start: 2024-04-21 | End: 2024-04-22 | Stop reason: HOSPADM

## 2024-04-21 RX ORDER — ASPIRIN 81 MG/1
81 TABLET ORAL DAILY
Status: DISCONTINUED | OUTPATIENT
Start: 2024-04-21 | End: 2024-04-22 | Stop reason: HOSPADM

## 2024-04-21 RX ORDER — ONDANSETRON 4 MG/1
4 TABLET, FILM COATED ORAL EVERY 8 HOURS PRN
Status: DISCONTINUED | OUTPATIENT
Start: 2024-04-21 | End: 2024-04-22 | Stop reason: HOSPADM

## 2024-04-21 RX ORDER — PANTOPRAZOLE SODIUM 40 MG/1
40 TABLET, DELAYED RELEASE ORAL
Status: DISCONTINUED | OUTPATIENT
Start: 2024-04-22 | End: 2024-04-22 | Stop reason: HOSPADM

## 2024-04-21 RX ORDER — FLUTICASONE FUROATE AND VILANTEROL 200; 25 UG/1; UG/1
1 POWDER RESPIRATORY (INHALATION)
Status: DISCONTINUED | OUTPATIENT
Start: 2024-04-21 | End: 2024-04-22 | Stop reason: HOSPADM

## 2024-04-21 RX ORDER — METOPROLOL TARTRATE 1 MG/ML
10 INJECTION, SOLUTION INTRAVENOUS EVERY 6 HOURS
Status: DISCONTINUED | OUTPATIENT
Start: 2024-04-21 | End: 2024-04-21

## 2024-04-21 RX ORDER — ROCURONIUM BROMIDE 10 MG/ML
INJECTION, SOLUTION INTRAVENOUS AS NEEDED
Status: DISCONTINUED | OUTPATIENT
Start: 2024-04-21 | End: 2024-04-21

## 2024-04-21 RX ORDER — LOSARTAN POTASSIUM 50 MG/1
100 TABLET ORAL DAILY
Status: DISCONTINUED | OUTPATIENT
Start: 2024-04-21 | End: 2024-04-22 | Stop reason: HOSPADM

## 2024-04-21 RX ORDER — ONDANSETRON HYDROCHLORIDE 2 MG/ML
4 INJECTION, SOLUTION INTRAVENOUS EVERY 8 HOURS PRN
Status: DISCONTINUED | OUTPATIENT
Start: 2024-04-21 | End: 2024-04-22 | Stop reason: HOSPADM

## 2024-04-21 RX ORDER — GUAIFENESIN 600 MG/1
600 TABLET, EXTENDED RELEASE ORAL EVERY 12 HOURS PRN
Status: DISCONTINUED | OUTPATIENT
Start: 2024-04-21 | End: 2024-04-22 | Stop reason: HOSPADM

## 2024-04-21 RX ORDER — PROPOFOL 10 MG/ML
INJECTION, EMULSION INTRAVENOUS AS NEEDED
Status: DISCONTINUED | OUTPATIENT
Start: 2024-04-21 | End: 2024-04-21

## 2024-04-21 RX ORDER — MORPHINE SULFATE 2 MG/ML
2 INJECTION, SOLUTION INTRAMUSCULAR; INTRAVENOUS EVERY 4 HOURS PRN
Status: DISCONTINUED | OUTPATIENT
Start: 2024-04-21 | End: 2024-04-22 | Stop reason: HOSPADM

## 2024-04-21 RX ORDER — BISACODYL 5 MG
10 TABLET, DELAYED RELEASE (ENTERIC COATED) ORAL DAILY PRN
Status: DISCONTINUED | OUTPATIENT
Start: 2024-04-21 | End: 2024-04-22 | Stop reason: HOSPADM

## 2024-04-21 RX ORDER — FENTANYL CITRATE 50 UG/ML
INJECTION, SOLUTION INTRAMUSCULAR; INTRAVENOUS AS NEEDED
Status: DISCONTINUED | OUTPATIENT
Start: 2024-04-21 | End: 2024-04-21

## 2024-04-21 RX ORDER — ACETAMINOPHEN 325 MG/1
650 TABLET ORAL EVERY 6 HOURS
Status: DISCONTINUED | OUTPATIENT
Start: 2024-04-21 | End: 2024-04-22 | Stop reason: HOSPADM

## 2024-04-21 RX ORDER — LIDOCAINE HCL/PF 100 MG/5ML
SYRINGE (ML) INTRAVENOUS AS NEEDED
Status: DISCONTINUED | OUTPATIENT
Start: 2024-04-21 | End: 2024-04-21

## 2024-04-21 RX ORDER — ACETAMINOPHEN 325 MG/1
650 TABLET ORAL EVERY 4 HOURS PRN
Status: DISCONTINUED | OUTPATIENT
Start: 2024-04-21 | End: 2024-04-21 | Stop reason: SDUPTHER

## 2024-04-21 RX ORDER — HYDRALAZINE HYDROCHLORIDE 20 MG/ML
5 INJECTION INTRAMUSCULAR; INTRAVENOUS EVERY 30 MIN PRN
Status: DISCONTINUED | OUTPATIENT
Start: 2024-04-21 | End: 2024-04-21 | Stop reason: HOSPADM

## 2024-04-21 RX ORDER — SODIUM CHLORIDE, SODIUM LACTATE, POTASSIUM CHLORIDE, CALCIUM CHLORIDE 600; 310; 30; 20 MG/100ML; MG/100ML; MG/100ML; MG/100ML
100 INJECTION, SOLUTION INTRAVENOUS CONTINUOUS
Status: DISCONTINUED | OUTPATIENT
Start: 2024-04-21 | End: 2024-04-21 | Stop reason: HOSPADM

## 2024-04-21 RX ORDER — LATANOPROST 50 UG/ML
1 SOLUTION/ DROPS OPHTHALMIC DAILY
Status: DISCONTINUED | OUTPATIENT
Start: 2024-04-21 | End: 2024-04-22 | Stop reason: HOSPADM

## 2024-04-21 RX ORDER — ACETAMINOPHEN 160 MG/5ML
650 SOLUTION ORAL EVERY 6 HOURS
Status: DISCONTINUED | OUTPATIENT
Start: 2024-04-21 | End: 2024-04-22 | Stop reason: HOSPADM

## 2024-04-21 RX ORDER — FAMOTIDINE 10 MG/ML
20 INJECTION INTRAVENOUS ONCE
Status: CANCELLED | OUTPATIENT
Start: 2024-04-21 | End: 2024-04-21

## 2024-04-21 RX ORDER — ALBUTEROL SULFATE 90 UG/1
1 AEROSOL, METERED RESPIRATORY (INHALATION) EVERY 4 HOURS PRN
Status: DISCONTINUED | OUTPATIENT
Start: 2024-04-21 | End: 2024-04-22 | Stop reason: HOSPADM

## 2024-04-21 RX ORDER — SODIUM CHLORIDE 450 MG/100ML
100 INJECTION, SOLUTION INTRAVENOUS CONTINUOUS
Status: DISCONTINUED | OUTPATIENT
Start: 2024-04-21 | End: 2024-04-22 | Stop reason: HOSPADM

## 2024-04-21 RX ORDER — ONDANSETRON HYDROCHLORIDE 2 MG/ML
4 INJECTION, SOLUTION INTRAVENOUS ONCE AS NEEDED
Status: DISCONTINUED | OUTPATIENT
Start: 2024-04-21 | End: 2024-04-21 | Stop reason: HOSPADM

## 2024-04-21 RX ORDER — ACETAMINOPHEN 650 MG/1
650 SUPPOSITORY RECTAL EVERY 6 HOURS
Status: DISCONTINUED | OUTPATIENT
Start: 2024-04-21 | End: 2024-04-22 | Stop reason: HOSPADM

## 2024-04-21 RX ORDER — DEXTROSE 50 % IN WATER (D50W) INTRAVENOUS SYRINGE
25
Status: DISCONTINUED | OUTPATIENT
Start: 2024-04-21 | End: 2024-04-22 | Stop reason: HOSPADM

## 2024-04-21 RX ORDER — CEFTRIAXONE 1 G/50ML
1 INJECTION, SOLUTION INTRAVENOUS EVERY 24 HOURS
Status: DISCONTINUED | OUTPATIENT
Start: 2024-04-22 | End: 2024-04-22 | Stop reason: HOSPADM

## 2024-04-21 RX ORDER — INSULIN LISPRO 100 [IU]/ML
0-5 INJECTION, SOLUTION INTRAVENOUS; SUBCUTANEOUS
Status: DISCONTINUED | OUTPATIENT
Start: 2024-04-21 | End: 2024-04-22 | Stop reason: HOSPADM

## 2024-04-21 RX ORDER — LABETALOL HYDROCHLORIDE 5 MG/ML
5 INJECTION, SOLUTION INTRAVENOUS ONCE AS NEEDED
Status: DISCONTINUED | OUTPATIENT
Start: 2024-04-21 | End: 2024-04-21 | Stop reason: HOSPADM

## 2024-04-21 RX ORDER — TALC
3 POWDER (GRAM) TOPICAL NIGHTLY PRN
Status: DISCONTINUED | OUTPATIENT
Start: 2024-04-21 | End: 2024-04-22 | Stop reason: HOSPADM

## 2024-04-21 RX ORDER — AMLODIPINE BESYLATE 2.5 MG/1
2.5 TABLET ORAL DAILY
Status: DISCONTINUED | OUTPATIENT
Start: 2024-04-21 | End: 2024-04-22 | Stop reason: HOSPADM

## 2024-04-21 RX ORDER — BISACODYL 10 MG/1
10 SUPPOSITORY RECTAL DAILY PRN
Status: DISCONTINUED | OUTPATIENT
Start: 2024-04-21 | End: 2024-04-22 | Stop reason: HOSPADM

## 2024-04-21 RX ORDER — NALOXONE HYDROCHLORIDE 0.4 MG/ML
0.2 INJECTION, SOLUTION INTRAMUSCULAR; INTRAVENOUS; SUBCUTANEOUS EVERY 5 MIN PRN
Status: DISCONTINUED | OUTPATIENT
Start: 2024-04-21 | End: 2024-04-22 | Stop reason: HOSPADM

## 2024-04-21 RX ORDER — DROPERIDOL 2.5 MG/ML
0.62 INJECTION, SOLUTION INTRAMUSCULAR; INTRAVENOUS ONCE AS NEEDED
Status: DISCONTINUED | OUTPATIENT
Start: 2024-04-21 | End: 2024-04-21 | Stop reason: HOSPADM

## 2024-04-21 RX ORDER — POTASSIUM CHLORIDE 14.9 MG/ML
20 INJECTION INTRAVENOUS ONCE
Status: COMPLETED | OUTPATIENT
Start: 2024-04-21 | End: 2024-04-21

## 2024-04-21 RX ORDER — SODIUM CHLORIDE, SODIUM LACTATE, POTASSIUM CHLORIDE, CALCIUM CHLORIDE 600; 310; 30; 20 MG/100ML; MG/100ML; MG/100ML; MG/100ML
100 INJECTION, SOLUTION INTRAVENOUS CONTINUOUS
Status: CANCELLED | OUTPATIENT
Start: 2024-04-21

## 2024-04-21 RX ORDER — PANTOPRAZOLE SODIUM 40 MG/10ML
40 INJECTION, POWDER, LYOPHILIZED, FOR SOLUTION INTRAVENOUS
Status: DISCONTINUED | OUTPATIENT
Start: 2024-04-22 | End: 2024-04-22 | Stop reason: HOSPADM

## 2024-04-21 RX ORDER — LEVOTHYROXINE SODIUM 50 UG/1
50 TABLET ORAL DAILY
Status: DISCONTINUED | OUTPATIENT
Start: 2024-04-21 | End: 2024-04-22 | Stop reason: HOSPADM

## 2024-04-21 RX ORDER — HYDRALAZINE HYDROCHLORIDE 20 MG/ML
10 INJECTION INTRAMUSCULAR; INTRAVENOUS EVERY 4 HOURS PRN
Status: DISCONTINUED | OUTPATIENT
Start: 2024-04-21 | End: 2024-04-21

## 2024-04-21 RX ORDER — ALBUTEROL SULFATE 0.83 MG/ML
2.5 SOLUTION RESPIRATORY (INHALATION) ONCE AS NEEDED
Status: DISCONTINUED | OUTPATIENT
Start: 2024-04-21 | End: 2024-04-21 | Stop reason: HOSPADM

## 2024-04-21 RX ADMIN — SODIUM CHLORIDE 100 ML/HR: 4.5 INJECTION, SOLUTION INTRAVENOUS at 20:31

## 2024-04-21 RX ADMIN — FENTANYL CITRATE 25 MCG: 50 INJECTION INTRAMUSCULAR; INTRAVENOUS at 17:46

## 2024-04-21 RX ADMIN — ENOXAPARIN SODIUM 40 MG: 40 INJECTION SUBCUTANEOUS at 20:31

## 2024-04-21 RX ADMIN — ROCURONIUM BROMIDE 40 MG: 10 INJECTION, SOLUTION INTRAVENOUS at 17:24

## 2024-04-21 RX ADMIN — FENTANYL CITRATE 25 MCG: 50 INJECTION INTRAMUSCULAR; INTRAVENOUS at 18:31

## 2024-04-21 RX ADMIN — POTASSIUM CHLORIDE 20 MEQ: 14.9 INJECTION, SOLUTION INTRAVENOUS at 16:29

## 2024-04-21 RX ADMIN — LIDOCAINE HYDROCHLORIDE 50 MG: 20 INJECTION, SOLUTION INTRAVENOUS at 17:24

## 2024-04-21 RX ADMIN — IOHEXOL 75 ML: 350 INJECTION, SOLUTION INTRAVENOUS at 12:28

## 2024-04-21 RX ADMIN — CLINDAMYCIN PHOSPHATE 600 MG: 600 INJECTION, SOLUTION INTRAVENOUS at 15:47

## 2024-04-21 RX ADMIN — SODIUM CHLORIDE 1000 ML: 9 INJECTION, SOLUTION INTRAVENOUS at 10:37

## 2024-04-21 RX ADMIN — PROPOFOL 120 MG: 10 INJECTION, EMULSION INTRAVENOUS at 17:24

## 2024-04-21 RX ADMIN — CEFTRIAXONE SODIUM 1 G: 1 INJECTION, SOLUTION INTRAVENOUS at 16:30

## 2024-04-21 RX ADMIN — ASPIRIN 81 MG: 81 TABLET, COATED ORAL at 20:30

## 2024-04-21 RX ADMIN — ONDANSETRON 4 MG: 2 INJECTION INTRAMUSCULAR; INTRAVENOUS at 18:14

## 2024-04-21 RX ADMIN — FENTANYL CITRATE 50 MCG: 50 INJECTION INTRAMUSCULAR; INTRAVENOUS at 17:24

## 2024-04-21 RX ADMIN — SUGAMMADEX 200 MG: 100 INJECTION, SOLUTION INTRAVENOUS at 18:14

## 2024-04-21 RX ADMIN — DEXAMETHASONE SODIUM PHOSPHATE 8 MG: 4 INJECTION INTRA-ARTICULAR; INTRALESIONAL; INTRAMUSCULAR; INTRAVENOUS; SOFT TISSUE at 17:24

## 2024-04-21 SDOH — SOCIAL STABILITY: SOCIAL INSECURITY: ABUSE: ADULT

## 2024-04-21 SDOH — HEALTH STABILITY: MENTAL HEALTH: CURRENT SMOKER: 0

## 2024-04-21 SDOH — SOCIAL STABILITY: SOCIAL INSECURITY: HAVE YOU HAD THOUGHTS OF HARMING ANYONE ELSE?: YES

## 2024-04-21 SDOH — SOCIAL STABILITY: SOCIAL INSECURITY: ARE THERE ANY APPARENT SIGNS OF INJURIES/BEHAVIORS THAT COULD BE RELATED TO ABUSE/NEGLECT?: NO

## 2024-04-21 SDOH — SOCIAL STABILITY: SOCIAL INSECURITY: DO YOU FEEL UNSAFE GOING BACK TO THE PLACE WHERE YOU ARE LIVING?: NO

## 2024-04-21 SDOH — SOCIAL STABILITY: SOCIAL INSECURITY: DO YOU FEEL ANYONE HAS EXPLOITED OR TAKEN ADVANTAGE OF YOU FINANCIALLY OR OF YOUR PERSONAL PROPERTY?: NO

## 2024-04-21 SDOH — SOCIAL STABILITY: SOCIAL INSECURITY: ARE YOU OR HAVE YOU BEEN THREATENED OR ABUSED PHYSICALLY, EMOTIONALLY, OR SEXUALLY BY ANYONE?: NO

## 2024-04-21 SDOH — SOCIAL STABILITY: SOCIAL INSECURITY: WERE YOU ABLE TO COMPLETE ALL THE BEHAVIORAL HEALTH SCREENINGS?: YES

## 2024-04-21 SDOH — SOCIAL STABILITY: SOCIAL INSECURITY: HAVE YOU HAD ANY THOUGHTS OF HARMING ANYONE ELSE?: NO

## 2024-04-21 SDOH — SOCIAL STABILITY: SOCIAL INSECURITY: DOES ANYONE TRY TO KEEP YOU FROM HAVING/CONTACTING OTHER FRIENDS OR DOING THINGS OUTSIDE YOUR HOME?: NO

## 2024-04-21 SDOH — SOCIAL STABILITY: SOCIAL INSECURITY: HAS ANYONE EVER THREATENED TO HURT YOUR FAMILY OR YOUR PETS?: NO

## 2024-04-21 ASSESSMENT — LIFESTYLE VARIABLES
HAVE YOU EVER FELT YOU SHOULD CUT DOWN ON YOUR DRINKING: NO
PRESCIPTION_ABUSE_PAST_12_MONTHS: NO
EVER FELT BAD OR GUILTY ABOUT YOUR DRINKING: NO
SKIP TO QUESTIONS 9-10: 1
AUDIT-C TOTAL SCORE: 0
HOW MANY STANDARD DRINKS CONTAINING ALCOHOL DO YOU HAVE ON A TYPICAL DAY: PATIENT DOES NOT DRINK
HOW OFTEN DO YOU HAVE 6 OR MORE DRINKS ON ONE OCCASION: NEVER
HOW OFTEN DO YOU HAVE A DRINK CONTAINING ALCOHOL: NEVER
EVER HAD A DRINK FIRST THING IN THE MORNING TO STEADY YOUR NERVES TO GET RID OF A HANGOVER: NO
AUDIT-C TOTAL SCORE: 0
SUBSTANCE_ABUSE_PAST_12_MONTHS: NO
TOTAL SCORE: 0
HAVE PEOPLE ANNOYED YOU BY CRITICIZING YOUR DRINKING: NO

## 2024-04-21 ASSESSMENT — PAIN SCALES - GENERAL
PAIN_LEVEL: 4
PAINLEVEL_OUTOF10: 0 - NO PAIN
PAINLEVEL_OUTOF10: 4
PAINLEVEL_OUTOF10: 8

## 2024-04-21 ASSESSMENT — ACTIVITIES OF DAILY LIVING (ADL)
PATIENT'S MEMORY ADEQUATE TO SAFELY COMPLETE DAILY ACTIVITIES?: YES
BATHING: INDEPENDENT
HEARING - LEFT EAR: FUNCTIONAL
ADEQUATE_TO_COMPLETE_ADL: YES
JUDGMENT_ADEQUATE_SAFELY_COMPLETE_DAILY_ACTIVITIES: YES
GROOMING: INDEPENDENT
FEEDING YOURSELF: INDEPENDENT
TOILETING: INDEPENDENT
DRESSING YOURSELF: INDEPENDENT
HEARING - RIGHT EAR: FUNCTIONAL
WALKS IN HOME: NEEDS ASSISTANCE
LACK_OF_TRANSPORTATION: NO

## 2024-04-21 ASSESSMENT — ENCOUNTER SYMPTOMS
HEADACHES: 0
RHINORRHEA: 0
DYSURIA: 0
NERVOUS/ANXIOUS: 0
FATIGUE: 0
WHEEZING: 0
AGITATION: 0
MYALGIAS: 0
ARTHRALGIAS: 0
WOUND: 0
STRIDOR: 0
ACTIVITY CHANGE: 0
BACK PAIN: 0
BLOOD IN STOOL: 0
CHEST TIGHTNESS: 0
COLOR CHANGE: 0
DECREASED CONCENTRATION: 0
DIFFICULTY URINATING: 0
SHORTNESS OF BREATH: 0
ANAL BLEEDING: 0
VOMITING: 0
DIARRHEA: 0
SINUS PAIN: 0
CONSTIPATION: 0
PALPITATIONS: 0
COUGH: 0
NUMBNESS: 0
LIGHT-HEADEDNESS: 0
APNEA: 0
WEAKNESS: 0
NAUSEA: 0
FEVER: 0
FREQUENCY: 0
DIAPHORESIS: 0
CONFUSION: 0
HEMATURIA: 0
CHILLS: 0
APPETITE CHANGE: 0
DIZZINESS: 0
JOINT SWELLING: 0
ABDOMINAL DISTENTION: 0
FLANK PAIN: 0
ABDOMINAL PAIN: 1
SORE THROAT: 0

## 2024-04-21 ASSESSMENT — COGNITIVE AND FUNCTIONAL STATUS - GENERAL
DAILY ACTIVITIY SCORE: 24
MOBILITY SCORE: 22
WALKING IN HOSPITAL ROOM: A LITTLE
PATIENT BASELINE BEDBOUND: NO
CLIMB 3 TO 5 STEPS WITH RAILING: A LITTLE

## 2024-04-21 ASSESSMENT — PATIENT HEALTH QUESTIONNAIRE - PHQ9
2. FEELING DOWN, DEPRESSED OR HOPELESS: NOT AT ALL
1. LITTLE INTEREST OR PLEASURE IN DOING THINGS: NOT AT ALL
SUM OF ALL RESPONSES TO PHQ9 QUESTIONS 1 & 2: 0

## 2024-04-21 ASSESSMENT — PAIN - FUNCTIONAL ASSESSMENT
PAIN_FUNCTIONAL_ASSESSMENT: 0-10

## 2024-04-21 ASSESSMENT — COLUMBIA-SUICIDE SEVERITY RATING SCALE - C-SSRS
2. HAVE YOU ACTUALLY HAD ANY THOUGHTS OF KILLING YOURSELF?: NO
1. IN THE PAST MONTH, HAVE YOU WISHED YOU WERE DEAD OR WISHED YOU COULD GO TO SLEEP AND NOT WAKE UP?: NO
6. HAVE YOU EVER DONE ANYTHING, STARTED TO DO ANYTHING, OR PREPARED TO DO ANYTHING TO END YOUR LIFE?: NO

## 2024-04-21 ASSESSMENT — PAIN DESCRIPTION - PAIN TYPE: TYPE: ACUTE PAIN

## 2024-04-21 ASSESSMENT — PAIN DESCRIPTION - FREQUENCY: FREQUENCY: INTERMITTENT

## 2024-04-21 ASSESSMENT — PAIN DESCRIPTION - DESCRIPTORS: DESCRIPTORS: SHARP;STABBING;TENDER

## 2024-04-21 ASSESSMENT — PAIN DESCRIPTION - LOCATION: LOCATION: ABDOMEN

## 2024-04-21 ASSESSMENT — PAIN DESCRIPTION - ORIENTATION: ORIENTATION: RIGHT;LOWER

## 2024-04-21 NOTE — ANESTHESIA POSTPROCEDURE EVALUATION
Patient: Tania Guerrero    Procedure Summary       Date: 04/21/24 Room / Location: [facility] 01 / Virtual [facility]    Anesthesia Start: 1717 Anesthesia Stop: 1842    Procedure: DIAGNOSTIC LAPAROSCOPY , Repair Spigelian Hernia (Right) Diagnosis:       Incarcerated hernia      Irreducible Spigelian hernia      (Incarcerated hernia [K46.0])      (Irreducible Spigelian hernia [K43.6])    Surgeons: Sumeet Cespedes MD Responsible Provider: IGNACIO Blakely    Anesthesia Type: general ASA Status: 3            Anesthesia Type: general    Vitals Value Taken Time   /55 04/21/24 1935   Temp 36.2 °C (97.2 °F) 04/21/24 1840   Pulse 55 04/21/24 1938   Resp 21 04/21/24 1938   SpO2 99 % 04/21/24 1938   Vitals shown include unfiled device data.    Anesthesia Post Evaluation    Patient location during evaluation: PACU  Patient participation: complete - patient participated  Level of consciousness: awake  Pain score: 4  Pain management: adequate  Airway patency: patent  Cardiovascular status: acceptable  Respiratory status: acceptable  Hydration status: acceptable  Postoperative Nausea and Vomiting: none    There were no known notable events for this encounter.

## 2024-04-21 NOTE — ANESTHESIA PREPROCEDURE EVALUATION
Patient: Tania Guerrero    Procedure Information       Anesthesia Start Date/Time: 04/21/24 7097    Procedure: Repair Spigelian Hernia (Right)    Location: [facility] 01 / Virtual [facility]    Surgeons: Sumeet Cespedes MD            Relevant Problems   Anesthesia   (+) PONV (postoperative nausea and vomiting)      Cardiac   (+) ASHD (arteriosclerotic heart disease)   (+) Hyperlipidemia   (+) Hypertension      Pulmonary   (+) COPD (chronic obstructive pulmonary disease) (Multi)      Neuro   (+) Carpal tunnel syndrome, bilateral upper limbs      Liver   (+) Hepatic hemangioma      Endocrine   (+) Hypothyroid      Musculoskeletal   (+) Carpal tunnel syndrome, bilateral upper limbs   (+) Primary osteoarthritis of both knees       Clinical information reviewed:   Tobacco  Allergies  Meds  Problems  Med Hx  Surg Hx   Fam Hx  Soc   Hx        NPO Detail:  No data recorded     Physical Exam    Airway  Mallampati: II  TM distance: >3 FB  Neck ROM: full     Cardiovascular - normal exam     Dental - normal exam     Pulmonary - normal exam     Abdominal - normal exam         Anesthesia Plan    History of general anesthesia?: yes  History of complications of general anesthesia?: no    ASA 3     general     The patient is not a current smoker.    intravenous induction   Postoperative administration of opioids is intended.  Anesthetic plan and risks discussed with patient.  Use of blood products discussed with patient who.    Plan discussed with CRNA.

## 2024-04-21 NOTE — H&P
History Obtained From: Pt    History Of Present Illness:  Tania Guerrero is a 84 y.o. female with PMHx s/f osteoporosis, hypothyroidism, HTN, Hld T2DM, COPD, CKD presenting with R inguinal area pain and mass. Pt states she has had a mass here for a long time, but never sought out medical attention for it. Over the past few days it started hurting more so she came to the hospital. No nausea, vomiting, lightheadedness, dizziness, or syncope. She admits to chronic loose stool, but no recent changes or additional hematochezia. She did take her AM medications this morning but hasn't eaten anything since.    ED Course (Summary):   Vitals on presentation: 97.3 F, 111 bpm, 16 rr, 139/69, 98% on RA  Labs: CMP glu 172, K 3.1, Cr 1.25  Mag 1.71, lactate 1.1, Lipase 29  CBC WBC 8.8, Hg 16.1, Plt 372  UA hazy, 1+ protein, positive nitrite, moderate leuk est  Positive FOBT  Imaging: CT a/p with contrast - small fat containing RLQ spigelian hernia which significant inflammation of the herniated fat, suggesting strangulation. Agree with interpretation.  Interventions: NS 1 L bolus, Clindamycin, Surgery was consulted and recommended emergent surgery. Pt will be admitted under IM.    ED Course:  Diagnoses as of 04/21/24 1621   Incarcerated hernia   Hypokalemia   Urinary tract infection without hematuria, site unspecified     Relevant Results  Results for orders placed or performed during the hospital encounter of 04/21/24 (from the past 24 hour(s))   CBC and Auto Differential   Result Value Ref Range    WBC 8.8 4.4 - 11.3 x10*3/uL    nRBC 0.0 0.0 - 0.0 /100 WBCs    RBC 5.11 4.00 - 5.20 x10*6/uL    Hemoglobin 16.1 (H) 12.0 - 16.0 g/dL    Hematocrit 46.9 (H) 36.0 - 46.0 %    MCV 92 80 - 100 fL    MCH 31.5 26.0 - 34.0 pg    MCHC 34.3 32.0 - 36.0 g/dL    RDW 12.9 11.5 - 14.5 %    Platelets 372 150 - 450 x10*3/uL    Neutrophils % 70.2 40.0 - 80.0 %    Immature Granulocytes %, Automated 0.2 0.0 - 0.9 %    Lymphocytes % 21.3 13.0 - 44.0  %    Monocytes % 6.8 2.0 - 10.0 %    Eosinophils % 0.9 0.0 - 6.0 %    Basophils % 0.6 0.0 - 2.0 %    Neutrophils Absolute 6.16 (H) 1.60 - 5.50 x10*3/uL    Immature Granulocytes Absolute, Automated 0.02 0.00 - 0.50 x10*3/uL    Lymphocytes Absolute 1.87 0.80 - 3.00 x10*3/uL    Monocytes Absolute 0.60 0.05 - 0.80 x10*3/uL    Eosinophils Absolute 0.08 0.00 - 0.40 x10*3/uL    Basophils Absolute 0.05 0.00 - 0.10 x10*3/uL   Magnesium   Result Value Ref Range    Magnesium 1.71 1.60 - 2.40 mg/dL   Lipase   Result Value Ref Range    Lipase 29 9 - 82 U/L   Lactate   Result Value Ref Range    Lactate 1.1 0.4 - 2.0 mmol/L   Comprehensive metabolic panel   Result Value Ref Range    Glucose 172 (H) 74 - 99 mg/dL    Sodium 138 136 - 145 mmol/L    Potassium 3.1 (L) 3.5 - 5.3 mmol/L    Chloride 102 98 - 107 mmol/L    Bicarbonate 24 21 - 32 mmol/L    Anion Gap 15 10 - 20 mmol/L    Urea Nitrogen 20 6 - 23 mg/dL    Creatinine 1.25 (H) 0.50 - 1.05 mg/dL    eGFR 43 (L) >60 mL/min/1.73m*2    Calcium 9.3 8.6 - 10.3 mg/dL    Albumin 4.1 3.4 - 5.0 g/dL    Alkaline Phosphatase 80 33 - 136 U/L    Total Protein 7.2 6.4 - 8.2 g/dL    AST 16 9 - 39 U/L    Bilirubin, Total 0.7 0.0 - 1.2 mg/dL    ALT 34 7 - 45 U/L   C. difficile, PCR    Specimen: Stool   Result Value Ref Range    C. difficile, PCR Not Detected Not Detected   Fecal Occult Blood Immunoassy    Specimen: Stool   Result Value Ref Range    Fecal Occult Blood Immunoassay Positive (A) Negative   Urinalysis with Reflex Culture and Microscopic   Result Value Ref Range    Color, Urine Yellow Straw, Yellow    Appearance, Urine Hazy (N) Clear    Specific Gravity, Urine 1.038 (N) 1.005 - 1.035    pH, Urine 5.0 5.0, 5.5, 6.0, 6.5, 7.0, 7.5, 8.0    Protein, Urine 30 (1+) (N) NEGATIVE mg/dL    Glucose, Urine NEGATIVE NEGATIVE mg/dL    Blood, Urine NEGATIVE NEGATIVE    Ketones, Urine NEGATIVE NEGATIVE mg/dL    Bilirubin, Urine NEGATIVE NEGATIVE    Urobilinogen, Urine <2.0 <2.0 mg/dL    Nitrite, Urine  POSITIVE (A) NEGATIVE    Leukocyte Esterase, Urine MODERATE (2+) (A) NEGATIVE   Microscopic Only, Urine   Result Value Ref Range    WBC, Urine >50 (A) 1-5, NONE /HPF    RBC, Urine 3-5 NONE, 1-2, 3-5 /HPF    Bacteria, Urine 2+ (A) NONE SEEN /HPF    Mucus, Urine 1+ Reference range not established. /LPF    Hyaline Casts, Urine 2+ (A) NONE /LPF      CT abdomen pelvis w IV contrast    Result Date: 4/21/2024  Interpreted By:  Brian Broussard, STUDY: CT ABDOMEN PELVIS W IV CONTRAST;  4/21/2024 12:37 pm   INDICATION: Signs/Symptoms:rlq pain, tender.   COMPARISON: 06/06/2023   ACCESSION NUMBER(S): [id number]   ORDERING CLINICIAN: SAMUEL JIN   TECHNIQUE: Contiguous axial images were obtained through the abdomen and pelvis following the intravenous administration of 75 cc Omnipaque 350. Coronal and sagittal reconstructions were performed.   FINDINGS: LOWER CHEST: Images through the lung bases  demonstrate mild areas of atelectasis and/or scarring. Small hiatal hernia.   ABDOMEN AND PELVIS:   LIVER: Stable cysts in the left hepatic lobe.   BILE DUCTS: Not abnormally dilated.   GALLBLADDER: No calcified stones. No wall thickening.   PANCREAS: Appears unremarkable.   SPLEEN: Appears unremarkable.   ADRENAL GLANDS: Stable prominence of the left adrenal gland. Right adrenal gland remains unremarkable.   KIDNEYS, URETERS, AND BLADDER: The kidneys enhance with contrast material symmetrically. There is no evidence of hydronephrosis. Stable cysts in the kidneys, measuring up to 6.1 cm on the left. Nonobstructing calculi in the inferior pole of the left kidney measuring up to 12 mm in size. 1 mm nonobstructing calculus within the midpole of the right kidney. There is urinary bladder prolapse, similar to the prior study. Urinary bladder is otherwise unremarkable and unchanged.   BOWEL: Colonic diverticulosis. No definite evidence of diverticulitis. There is mild prominence of the walls of the colon most likely related to incomplete  distention. Colitis felt less likely. There is proliferation of the submucosal fat of a portion of the distal/terminal ileum, as well as the ascending colon, nonspecific, but can be associated with chronic inflammation. There is no evidence of a bowel obstruction.  Appendix is not identified. Although CT has limited sensitivity and specificity for gastric pathology, the stomach appears grossly unremarkable.   RETROPERITONEUM, VESSELS: There is no aneurysmal dilatation of the abdominal aorta. The IVC is within normal limits.  There are atherosclerotic calcifications of the aorta and its branches. No pathologically enlarged retroperitoneal lymph nodes are noted.   PERITONEUM: There is no evidence of pneumoperitoneum.  No ascites or loculated fluid collection noted.  No pathologically enlarged mesenteric lymph nodes are identified.   ABDOMINAL WALL, SOFT TISSUES: There is a fat containing anterior abdominal wall hernia in the right lower quadrant, likely a spigelian hernia. The herniated fat is inflamed, suggesting strangulation. Small fat containing left inguinal hernia noted and unchanged.   SKELETON: Osteopenia. Degenerate changes. No acute process.       Small fat containing right lower quadrant spigelian hernia which demonstrates significant inflammation of the herniated fat, suggesting strangulation. Prominence of the walls of the colon most likely relates to incomplete distention. Colitis felt less likely. Nonobstructive nephrolithiasis.   MACRO: None.   Signed by: Brian Broussard 4/21/2024 1:02 PM Dictation workstation:   [id number]     Scheduled medications:  [Held by provider] amLODIPine, 2.5 mg, oral, Daily  [Held by provider] aspirin, 81 mg, oral, Daily  cefTRIAXone, 1 g, intravenous, Once  enoxaparin, 40 mg, subcutaneous, q24h  tiotropium, 2 puff, inhalation, Daily   And  fluticasone furoate-vilanteroL, 1 puff, inhalation, Daily  insulin lispro, 0-5 Units, subcutaneous, TID with meals  latanoprost, 1 drop,  Both Eyes, Daily  [Held by provider] levothyroxine, 50 mcg, oral, Daily  [Held by provider] losartan 100 mg, hydroCHLOROthiazide 25 mg for Hyzaar 100/25, , oral, Daily  [Held by provider] metoprolol succinate XL, 100 mg, oral, Daily  metoprolol, 10 mg, intravenous, q6h  [START ON 4/22/2024] pantoprazole, 40 mg, oral, Daily before breakfast   Or  [START ON 4/22/2024] pantoprazole, 40 mg, intravenous, Daily before breakfast  potassium chloride, 20 mEq, intravenous, Once      Continuous medications:     PRN medications:  PRN medications: albuterol, bisacodyl, bisacodyl, dextrose, dextrose, glucagon, glucagon, guaiFENesin, hydrALAZINE, melatonin, ondansetron **OR** ondansetron      Past Medical History  She has a past medical history of Disorder of kidney and ureter, unspecified (06/03/2021), Diverticulitis of intestine, part unspecified, without perforation or abscess without bleeding, Hydronephrosis, left (06/08/2023), Medullary cystic kidney, Old myocardial infarction, Other conditions influencing health status, Personal history of other diseases of the respiratory system (06/24/2019), Personal history of other infectious and parasitic diseases, Personal history of other specified conditions (06/11/2018), Personal history of urinary calculi, and Unspecified hydronephrosis (05/20/2021).    Surgical History  She has a past surgical history that includes Lithotripsy (05/16/2016); Other surgical history (06/24/2019); Other surgical history (12/02/2019); Other surgical history (12/02/2019); and Other surgical history (12/02/2019).     Social History  She reports that she has never smoked. She has never used smokeless tobacco. She reports that she does not currently use alcohol. She reports that she does not use drugs.    Family History  Family History   Problem Relation Name Age of Onset    No Known Problems Father          Allergies  Levofloxacin, Nickel, Penicillins, and Sulfa (sulfonamide antibiotics)    Code  Status  Full Code     Review of Systems   Constitutional:  Negative for activity change, appetite change, chills, diaphoresis, fatigue and fever.   HENT:  Negative for congestion, ear pain, rhinorrhea, sinus pain and sore throat.    Respiratory:  Negative for apnea, cough, chest tightness, shortness of breath, wheezing and stridor.    Cardiovascular:  Negative for chest pain, palpitations and leg swelling.   Gastrointestinal:  Positive for abdominal pain. Negative for abdominal distention, anal bleeding, blood in stool, constipation, diarrhea, nausea and vomiting.   Genitourinary:  Negative for difficulty urinating, dysuria, flank pain, frequency, hematuria and urgency.   Musculoskeletal:  Negative for arthralgias, back pain, gait problem, joint swelling and myalgias.   Skin:  Negative for color change, pallor, rash and wound.   Neurological:  Negative for dizziness, syncope, weakness, light-headedness, numbness and headaches.   Psychiatric/Behavioral:  Negative for agitation, behavioral problems, confusion and decreased concentration. The patient is not nervous/anxious.    All other systems reviewed and are negative.      Last Recorded Vitals  /70   Pulse 55   Temp 36.3 °C (97.3 °F) (Temporal)   Resp 18   Wt 77.1 kg (170 lb)   SpO2 95%      Physical Exam  Vitals and nursing note reviewed.   Constitutional:       General: She is in acute distress.      Appearance: Normal appearance. She is normal weight. She is not ill-appearing or toxic-appearing.   HENT:      Head: Normocephalic and atraumatic.      Mouth/Throat:      Mouth: Mucous membranes are moist.      Pharynx: No posterior oropharyngeal erythema.   Eyes:      Extraocular Movements: Extraocular movements intact.      Pupils: Pupils are equal, round, and reactive to light.   Cardiovascular:      Rate and Rhythm: Normal rate and regular rhythm.      Heart sounds: Normal heart sounds. No murmur heard.     No friction rub. No gallop.   Pulmonary:       Effort: Pulmonary effort is normal. No respiratory distress.      Breath sounds: Normal breath sounds. No stridor. No wheezing, rhonchi or rales.   Abdominal:      General: Abdomen is flat. Bowel sounds are normal. There is no distension.      Palpations: Abdomen is soft. There is mass.      Tenderness: There is no abdominal tenderness. There is no right CVA tenderness, left CVA tenderness, guarding or rebound.      Comments: R inguinal area tender mass to palpation   Musculoskeletal:         General: No swelling, tenderness, deformity or signs of injury. Normal range of motion.      Right lower leg: No edema.      Left lower leg: No edema.   Skin:     General: Skin is warm and dry.      Coloration: Skin is not jaundiced or pale.      Findings: No bruising, erythema, lesion or rash.   Neurological:      General: No focal deficit present.      Mental Status: She is alert and oriented to person, place, and time. Mental status is at baseline.      Cranial Nerves: No cranial nerve deficit.      Sensory: No sensory deficit.      Motor: No weakness.   Psychiatric:         Mood and Affect: Mood normal.         Behavior: Behavior normal.         Thought Content: Thought content normal.         Judgment: Judgment normal.         Assessment/Plan   Principal Problem:    Incarcerated hernia  Active Problems:    Hypothyroid    Chronic kidney disease, stage 3a (Multi)    Diet-controlled type 2 diabetes mellitus (Multi)    Hyperlipidemia    Hypertension    Hypokalemia    COPD (chronic obstructive pulmonary disease) (Multi)    Tachycardia    Hyperglycemia      Plan:  Admit to [facility] with tele.    Incarcerated hernia:  Surgery consulted.  NPO  Pain control    HTN:  Hold PO bp meds. PRN Hydralazine ordered IV while NPO    UTI:  Ucx pending.  Rocephin    Hypokalemia:  K runs ordered. Recheck in AM.    COPD:  Home inhalers    DM2:  SSI    Hypothyroid:  Restart PO home Synthroid after surgery    DVT ppx: Lovenox subcutaneous, needs  unheld after surgery  NPO for now  Full code per discussion with pt.         DO Theodore Barrios dictation software was used to dictate this note and thus there may be minor errors in translation/transcription including garbled speech or misspellings. Please contact for clarification if needed.

## 2024-04-21 NOTE — ED PROVIDER NOTES
HPI   Chief Complaint   Patient presents with    Abdominal Pain     R lower quadrant pain ongoing intermittently for over a year. Pt noted a lump in the location this past week. Pain with palpation and when straining to go to bathroom. Denies N/V/D. Denies urinary complaints       History of present illness: 84-year-old female with history of diverticulosis, medullary cystic kidney, coronary artery disease, kidney stones complains of lower abdominal pain worse over the last week.  Patient noted that she has had a swelling area of her right lower abdomen for about a year.  Has been an issue on and off.  Over the last week it has become persistently painful and swollen.  Patient notes that she had a bowel movement this morning that was normal.  The area of swelling gets a little worse with having a bowel movement.  She has nausea without vomiting.  Denies any dysuria, polyuria, vaginal leading to vaginal discharge.    Review of systems: Constitutional, eye, ENT, cardiovascular, respiratory, gastrointestinal, genitourinary, neurologic, musculoskeletal, dermatologic, hematologic, endocrine systems were evaluated and were negative unless otherwise specified in history of present illness.    Medications: Reviewed and per nursing note.    Family history: Denies relevant medical conditions.    Social history: Denies tobacco, alcohol, drug use.      Physical exam:    Appearance: Well-developed, well-nourished, nontoxic-appearing, alert and oriented x3. Talking in complete sentences.    HEENT:  Head normocephalic atraumatic, extraocular movements intact, pupils equal round reactive to light, mucous membranes are moist and pink.    NECK:  Nml Inspection, no meningismus, no thyromegaly, no lymphadenopathy, no JVD, trachea is midline.    Respiratory: Clear to auscultation bilaterally with normal bilateral excursion. No wheezes, rhonchi, crackles.    Cardiovascular: Regular rate and rhythm, no murmurs, rubs or gallops. Pulses 2+  symmetrically in the dorsalis pedis and radial pulses.    Abdomen/GI: Firm palpable mass about 3 cm diameter tender in the right lower quadrant.  Attempted to reduce possible hernia unsuccessfully.  Nondistended, normal bowel sounds x4. No masses or organomegaly.    :  No CVA tenderness    Neuro:  Oriented x 3, Speech Clear, cranial nerves grossly intact. Normal sensation to light touch in all 4 extremities.    Musculoskeletal: Patient spontaneously moves all 4 extremities.    Skin:  No cyanosis, clubbing, edema, open wounds, or rashes.                          [place] Coma Scale Score: 15                     Patient History   Past Medical History:   Diagnosis Date    Disorder of kidney and ureter, unspecified 06/03/2021    Lesion of left native ureter    Diverticulitis of intestine, part unspecified, without perforation or abscess without bleeding     Diverticulitis    Hydronephrosis, left 06/08/2023    Medullary cystic kidney     Kidney, medullary sponge    Old myocardial infarction     History of acute anterior wall MI    Other conditions influencing health status     History of urethral stent    Personal history of other diseases of the respiratory system 06/24/2019    History of chronic obstructive lung disease    Personal history of other infectious and parasitic diseases     History of Clostridium difficile infection    Personal history of other specified conditions 06/11/2018    History of dizziness    Personal history of urinary calculi     History of kidney stones    Unspecified hydronephrosis 05/20/2021    Hydronephrosis, left     Past Surgical History:   Procedure Laterality Date    LITHOTRIPSY  05/16/2016    Renal Lithotripsy    OTHER SURGICAL HISTORY  06/24/2019    Tooth extraction    OTHER SURGICAL HISTORY  12/02/2019    Tonsillectomy    OTHER SURGICAL HISTORY  12/02/2019    Dilation and curettage    OTHER SURGICAL HISTORY  12/02/2019    Hernia repair     Family History   Problem Relation Name Age  of Onset    No Known Problems Father       Social History     Tobacco Use    Smoking status: Never    Smokeless tobacco: Never   Vaping Use    Vaping status: Never Used   Substance Use Topics    Alcohol use: Not Currently    Drug use: Never       Physical Exam   ED Triage Vitals [04/21/24 0928]   Temperature Heart Rate Respirations BP   36.3 °C (97.3 °F) (!) 111 16 139/69      Pulse Ox Temp Source Heart Rate Source Patient Position   98 % Temporal Monitor --      BP Location FiO2 (%)     -- --       Physical Exam    ED Course & MDM   Diagnoses as of 04/27/24 1813   Incarcerated hernia   Hypokalemia   Urinary tract infection without hematuria, site unspecified       Medical Decision Making  Labs Reviewed  FECAL OCCULT BLOOD IMMUNOASSY - Abnormal     Fecal Occult Blood Immunoassay   Positive (*)            CBC WITH AUTO DIFFERENTIAL - Abnormal     WBC                           8.8                    nRBC                          0.0                    RBC                           5.11                   Hemoglobin                    16.1 (*)               Hematocrit                    46.9 (*)               MCV                           92                     MCH                           31.5                   MCHC                          34.3                   RDW                           12.9                   Platelets                     372                    Neutrophils %                 70.2                   Immature Granulocytes %, Automated   0.2                    Lymphocytes %                 21.3                   Monocytes %                   6.8                    Eosinophils %                 0.9                    Basophils %                   0.6                    Neutrophils Absolute          6.16 (*)               Immature Granulocytes Absolute, Au*   0.02                   Lymphocytes Absolute          1.87                   Monocytes Absolute            0.60                   Eosinophils  Absolute          0.08                   Basophils Absolute            0.05                URINALYSIS WITH REFLEX CULTURE AND MICROSCOPIC - Abnormal     Color, Urine                  Yellow                 Appearance, Urine             Hazy (*)               Specific Gravity, Urine       1.038 (*)               pH, Urine                     5.0                    Protein, Urine                30 (1+) (*)               Glucose, Urine                NEGATIVE                Blood, Urine                  NEGATIVE                Ketones, Urine                NEGATIVE                Bilirubin, Urine              NEGATIVE                Urobilinogen, Urine           <2.0                   Nitrite, Urine                POSITIVE (*)               Leukocyte Esterase, Urine       (*)               COMPREHENSIVE METABOLIC PANEL - Abnormal     Glucose                       172 (*)                Sodium                        138                    Potassium                     3.1 (*)                Chloride                      102                    Bicarbonate                   24                     Anion Gap                     15                     Urea Nitrogen                 20                     Creatinine                    1.25 (*)               eGFR                          43 (*)                 Calcium                       9.3                    Albumin                       4.1                    Alkaline Phosphatase          80                     Total Protein                 7.2                    AST                           16                     Bilirubin, Total              0.7                    ALT                           34                  MICROSCOPIC ONLY, URINE - Abnormal     WBC, Urine                    >50 (*)                RBC, Urine                    3-5                    Bacteria, Urine               2+ (*)                 Mucus, Urine                  1+                     Hyaline  Casts, Urine          2+ (*)              C. DIFFICILE, PCR - Normal     C. difficile, PCR                                        Narrative: This test is an FDA-cleared real-time PCR assay for detection of toxigenic C. difficile DNA from unprocessed liquid or unformed stool specimens that have not undergone nucleic acid extraction in symptomatic patients with potential C. difficile infection (CDI). A positive result may indicate colonization, and clinical assessment is required for the diagnosis of CDI. This test cannot be performed on formed stools or used as a test of cure, and should not be performed more than once per 7 days.  MAGNESIUM - Normal     Magnesium                     1.71                LIPASE - Normal     Lipase                        29                         Narrative: Venipuncture immediately after or during the administration of Metamizole may lead to falsely low results. Testing should be performed immediately prior to Metamizole dosing.  LACTATE - Normal     Lactate                       1.1                        Narrative: Venipuncture immediately after or during the administration of Metamizole may lead to falsely low results. Testing should be performed immediately                  prior to Metamizole dosing.  STOOL PATHOGEN PANEL, PCR  URINE CULTURE  URINALYSIS WITH REFLEX CULTURE AND MICROSCOPIC         Narrative: The following orders were created for panel order Urinalysis with Reflex Culture and Microscopic.                  Procedure                               Abnormality         Status                                     ---------                               -----------         ------                                     Urinalysis with Reflex C...[[id number]]  Abnormal            Final result                               Extra Urine Gray Tube[[id number]]                            In process                                                   Please view results for these tests on  the individual orders.  EXTRA URINE GRAY TUBE  OVA/PARA + GIARDIA/CRYPTOSPORIDIUM ANTIGEN         Narrative: The following orders were created for panel order Ova/Para + Giardia/Cryptosporidium Antigen.                  Procedure                               Abnormality         Status                                     ---------                               -----------         ------                                     Ova and Parasite Examina...[[id number]]                      In process                                 Giardia Antigen[[id number]]                                  In process                                 Cryptosporidium Antigen,...[[id number]]                      In process                                                   Please view results for these tests on the individual orders.  OVA AND PARASITE EXAMINATION  GIARDIA ANTIGEN  CRYPTOSPORIDIUM ANTIGEN, STOOL    CT abdomen pelvis w IV contrast   Final Result    Small fat containing right lower quadrant spigelian hernia which    demonstrates significant inflammation of the herniated fat,    suggesting strangulation. Prominence of the walls of the colon most    likely relates to incomplete distention. Colitis felt less likely.    Nonobstructive nephrolithiasis.          MACRO:    None.          Signed by: Brian Broussard 4/21/2024 1:02 PM    Dictation workstation:   [id number]         84-year-old female complains of abdominal pain.  Differential diagnosis of hernia that could be incarcerated or strangulated, abdominal abscess, diverticulitis, appendicitis, cholecystitis, pancreatitis, bowel obstruction.  Examination shows tender lesion on the right lower quadrant but cannot be reduced.    CBC CMP lipase urinalysis CT ab pelvis ordered.  Patient drove here today and cannot give her any additional medicine at this time for pain.    Labs show CBC with white blood cell count 8.8 hemoglobin 16.1, lipase normal, magnesium normal, lactate normal, CMP  glucose 172 potassium 3.1 creatinine 1.25 otherwise unremarkable.  Ordered potassium supplementation IV.  Urinalysis shows positive nitrate leukocyte Estrace bacteria and white cells.  CT abdomen pelvis shows fat-containing anterior abdominal wall hernia in the right lower quadrant likely spigelian hernia.  The herniated fat is inflamed suggesting strangulation's.  Small fat-containing left inguinal hernia noted and unchanged.    Spoke with Dr. Cespedes who would like to take the patient to the operating room for surgical repair of incarcerated hernia.  Patient has multiple allergies.  Given presurgical clindamycin IV.  Patient's later produced a urine sample which was found to be positive for urinary tract infection.  Will initiate additional antibiotic possible ceftriaxone if patient does not have anaphylaxis allergy to penicillins.  Patient will be admitted to the service of Dr. Mendez and urgently go to the operating room for repair.        Procedure  Critical Care    Performed by: Vinicius Melo PA-C  Authorized by: Carolyn Cleaning MD    Critical care provider statement:     Critical care time (minutes):  60    Critical care time was exclusive of:  Separately billable procedures and treating other patients and teaching time    Critical care was necessary to treat or prevent imminent or life-threatening deterioration of the following conditions: Incarcerated hernia.    Critical care was time spent personally by me on the following activities:  Blood draw for specimens, development of treatment plan with patient or surrogate, ordering and performing treatments and interventions, ordering and review of laboratory studies, ordering and review of radiographic studies, re-evaluation of patient's condition, evaluation of patient's response to treatment and examination of patient    Care discussed with: admitting provider         Vinicius Melo PA-C  04/21/24 1610       Vinicius Melo PA-C  04/21/24 1611    This patient  was seen by the advanced practice provider.  I have personally performed a substantive portion of the encounter.  I have seen and examined the patient; agree with the workup, evaluation, MDM, management and diagnosis.  The care plan has been discussed.      I personally saw the patient and made/approved the management plan and take responsibility for the patient management.    History: Presents with abdominal pain  Exam: tender to palpation belly soft  MDM: patient with incarcerated hernia seen by surgery going for planned operative management       Carolyn Cleaning MD  04/27/24 0162

## 2024-04-21 NOTE — OP NOTE
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] Strangulated spigalean hernia       Procedures  DIAGNOSTIC LAPAROSCOPY , Repair Spigelian Hernia  04169 - CA RPR AA HERNIA 1ST 3-10 CM NCRC8/STRANGULATED      Surgeons      * Sumeet Cespedes - Primary    Resident/Fellow/Other Assistant:  Surgeons and Role:  * No surgeons found with a matching role *  Jaki Eveleth  Procedure Summary  Anesthesia: General  ASA: III  Anesthesia Staff: CRNA: LEXII Blakely-CRNA  Estimated Blood Loss: 3 mL  Intra-op Medications:   Administrations occurring from 1720 to 1820 on 24:   Medication Name Total Dose   albuterol 90 mcg/actuation inhaler 1 puff Cannot be calculated   bisacodyl (Dulcolax) EC tablet 10 mg Cannot be calculated   bisacodyl (Dulcolax) suppository 10 mg Cannot be calculated   cefTRIAXone (Rocephin) IVPB 1 g Cannot be calculated   dextrose 50 % injection 12.5 g Cannot be calculated   dextrose 50 % injection 25 g Cannot be calculated   glucagon (Glucagen) injection 1 mg Cannot be calculated   glucagon (Glucagen) injection 1 mg Cannot be calculated   guaiFENesin (Mucinex) 12 hr tablet 600 mg Cannot be calculated   hydrALAZINE (Apresoline) injection 10 mg Cannot be calculated   insulin lispro (HumaLOG) injection 0-5 Units Cannot be calculated   latanoprost (Xalatan) 0.005 % ophthalmic solution 1 drop Cannot be calculated   melatonin tablet 3 mg Cannot be calculated   metoprolol tartrate (Lopressor) injection 10 mg Cannot be calculated              Anesthesia Record               Intraprocedure I/O Totals       None           Specimen:   ID Type Source Tests Collected by Time   1 : HERNIA SAC WITH STRANGULATED FAT Tissue HERNIA SAC SURGICAL PATHOLOGY EXAM Sumeet Cespedes,  MD 4/21/2024 6148        Staff:   Circulator: Laura Nicole RN  Scrub Person: Alessia Los         Drains and/or Catheters:   Urethral Catheter Non-latex 16 Fr. (Active)       Tourniquet Times:         Implants:     Findings:       Indications: Tania Guerrero is an 84 y.o. 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       The patient was seen in the preoperative area. The risks, benefits, complications, treatment options, non-operative alternatives, expected recovery and outcomes were discussed with the patient. The possibilities of reaction to medication, pulmonary aspiration, injury to surrounding structures, bleeding, recurrent infection, the need for additional procedures, failure to diagnose a condition, and creating a complication requiring transfusion or operation were discussed with the patient. The patient concurred with the proposed plan, giving informed consent.  The site of surgery was properly noted/marked if necessary per policy. The patient has been actively warmed in preoperative area. Preoperative antibiotics w/I 1 hr of incision Venous thrombosis prophylaxis have been ordered including bilateral sequential compression devices    Procedure Details: Patient presented with a painful mass in the right lower quadrant found on CAT scan to be incarcerated spigalean hernia.    She was counseled for surgery brought to the operating room placed in supine position and placed under general endotracheal anesthesia.  Orogastric tube Ambriz catheter PAS stockings were placed.  Abdomen was prepped and draped in usual sterile fashion.  Supraumbilical vertical incision was made carried down through the subcutaneous tissues to the fascia which was grasped bilaterally elevated incised and Tavia clamp was used to enter the peritoneum bluntly.  Bilateral stay sutures of 0 Vicryl placed.  Garg trocar was placed.  Abdomen was insufflated to pressure 15  with CO2 gas and a suprapubic and left lower quadrant port were placed.  These were 5 mm ports.  Initial view showed a piece of intra-abdominal fat going up into a hernia defect in the abdominal wall.  This was not in the pericecal region.  We found the appendix and made sure that the appendix was uninvolved which it was not involved.  This fat could not be reduced.  The fat was clipped at the abdominal wall and then transected with a clip going into abdominally.  Incision was then made in the right lower quadrant overlying the mass carried down through the subcutaneous tissues.  External oblique was opened in the direction of its fibers.  This allowed a hernia sac to come up into the wound it was edematous and purple.  Hernia sac was brought up into the wound opened and then excised in toto.  Inside of the sac was the peritoneum with strangulated intra-abdominal fat within the sac.  Once this was excised and the walls of the sac had been tied closure was begun.  The internal oblique and transversus abdominis were closed with interrupted 0 Ethibond sutures.  Once this was done external bleak was closed with running 0 Ethibond suture.  The abdomen was then reinsufflated to low pressure the hernia defect had been closed there was no air leakage there were no intra-abdominal contents caught in the sutures as such the abdomen was desufflated after the 5 mm ports were removed under direct vision without any port site bleeding.  Garg trocar was removed and the preplaced fascial sutures were tied in the supraumbilical midline and skin wounds were closed with subcuticular and subcutaneous Vicryl sutures.  She tolerated the procedure well will be awakened extubated and returned to recovery room in satisfactory condition.Hernia was 4 cm in size  Complications:  None; patient tolerated the procedure well.    Disposition: PACU - hemodynamically stable.  Condition: stable         Additional Details:       Attending  Attestation: I was present for the entire procedure.    Sumeet Cespedes  Phone Number: [phone]

## 2024-04-21 NOTE — ANESTHESIA PROCEDURE NOTES
Airway  Date/Time: 4/21/2024 5:26 PM  Urgency: elective    Airway not difficult    Staffing  Performed: CRNA   Authorized by: IGNACIO Blakely    Performed by: IGNACIO Blakely  Patient location during procedure: OR    Indications and Patient Condition  Indications for airway management: anesthesia  Spontaneous ventilation: present  Sedation level: deep  Preoxygenated: yes  Patient position: sniffing  Mask difficulty assessment: 1 - vent by mask  Planned trial extubation    Final Airway Details  Final airway type: endotracheal airway      Successful airway: ETT  Cuffed: yes   Successful intubation technique: video laryngoscopy  Facilitating devices/methods: intubating stylet  Endotracheal tube insertion site: oral  Blade: Maggie  Blade size: #3  ETT size (mm): 7.0  Cormack-Lehane Classification: grade I - full view of glottis  Placement verified by: chest auscultation and capnometry   Measured from: lips  ETT to lips (cm): 21  Number of attempts at approach: 1

## 2024-04-21 NOTE — CONSULTS
Reason For Consult   spigalien hernia    History Of Present Illness  Tania Guerrero is a 84 y.o. female presenting with patient presents with several days of right lower quadrant abdominal pain.  She was found to have a subcutaneous mass which was reported to be partially reduced by the ER staff.  On CAT scan she has a new spot Shahram hernia in the right lower quadrant.  She did not have this on CAT scan a year ago.  This is tender..     Past Medical History  She has a past medical history of Disorder of kidney and ureter, unspecified (06/03/2021), Diverticulitis of intestine, part unspecified, without perforation or abscess without bleeding, Hydronephrosis, left (06/08/2023), Medullary cystic kidney, Old myocardial infarction, Other conditions influencing health status, Personal history of other diseases of the respiratory system (06/24/2019), Personal history of other infectious and parasitic diseases, Personal history of other specified conditions (06/11/2018), Personal history of urinary calculi, and Unspecified hydronephrosis (05/20/2021).    Surgical History  She has a past surgical history that includes Lithotripsy (05/16/2016); Other surgical history (06/24/2019); Other surgical history (12/02/2019); Other surgical history (12/02/2019); and Other surgical history (12/02/2019).     Social History  She reports that she has never smoked. She has never used smokeless tobacco. She reports that she does not currently use alcohol. She reports that she does not use drugs.    Family History  Family History   Problem Relation Name Age of Onset    No Known Problems Father          Allergies  Levofloxacin, Nickel, Penicillins, and Sulfa (sulfonamide antibiotics)    Review of Systems  Review of systems shows she has no active symptoms otherwise     Physical Exam  Patient is awake and alert  Heart is regular rhythm without murmur.  Lungs are clear bilaterally.  Abdomen is slightly obese soft tenderness limited to the  "right lower quadrant where there is a tender firm mass.  Mass is approximately 4 cm x 2 cm oriented horizontally.     Last Recorded Vitals  Blood pressure 117/70, pulse 55, temperature 36.3 °C (97.3 °F), temperature source Temporal, resp. rate 18, height 1.575 m (5' 2\"), weight 77.1 kg (170 lb), SpO2 95%.    Relevant Results  Results for orders placed or performed during the hospital encounter of 04/21/24 (from the past 24 hour(s))   CBC and Auto Differential   Result Value Ref Range    WBC 8.8 4.4 - 11.3 x10*3/uL    nRBC 0.0 0.0 - 0.0 /100 WBCs    RBC 5.11 4.00 - 5.20 x10*6/uL    Hemoglobin 16.1 (H) 12.0 - 16.0 g/dL    Hematocrit 46.9 (H) 36.0 - 46.0 %    MCV 92 80 - 100 fL    MCH 31.5 26.0 - 34.0 pg    MCHC 34.3 32.0 - 36.0 g/dL    RDW 12.9 11.5 - 14.5 %    Platelets 372 150 - 450 x10*3/uL    Neutrophils % 70.2 40.0 - 80.0 %    Immature Granulocytes %, Automated 0.2 0.0 - 0.9 %    Lymphocytes % 21.3 13.0 - 44.0 %    Monocytes % 6.8 2.0 - 10.0 %    Eosinophils % 0.9 0.0 - 6.0 %    Basophils % 0.6 0.0 - 2.0 %    Neutrophils Absolute 6.16 (H) 1.60 - 5.50 x10*3/uL    Immature Granulocytes Absolute, Automated 0.02 0.00 - 0.50 x10*3/uL    Lymphocytes Absolute 1.87 0.80 - 3.00 x10*3/uL    Monocytes Absolute 0.60 0.05 - 0.80 x10*3/uL    Eosinophils Absolute 0.08 0.00 - 0.40 x10*3/uL    Basophils Absolute 0.05 0.00 - 0.10 x10*3/uL   Magnesium   Result Value Ref Range    Magnesium 1.71 1.60 - 2.40 mg/dL   Lipase   Result Value Ref Range    Lipase 29 9 - 82 U/L   Lactate   Result Value Ref Range    Lactate 1.1 0.4 - 2.0 mmol/L   Comprehensive metabolic panel   Result Value Ref Range    Glucose 172 (H) 74 - 99 mg/dL    Sodium 138 136 - 145 mmol/L    Potassium 3.1 (L) 3.5 - 5.3 mmol/L    Chloride 102 98 - 107 mmol/L    Bicarbonate 24 21 - 32 mmol/L    Anion Gap 15 10 - 20 mmol/L    Urea Nitrogen 20 6 - 23 mg/dL    Creatinine 1.25 (H) 0.50 - 1.05 mg/dL    eGFR 43 (L) >60 mL/min/1.73m*2    Calcium 9.3 8.6 - 10.3 mg/dL    Albumin " 4.1 3.4 - 5.0 g/dL    Alkaline Phosphatase 80 33 - 136 U/L    Total Protein 7.2 6.4 - 8.2 g/dL    AST 16 9 - 39 U/L    Bilirubin, Total 0.7 0.0 - 1.2 mg/dL    ALT 34 7 - 45 U/L   C. difficile, PCR    Specimen: Stool   Result Value Ref Range    C. difficile, PCR Not Detected Not Detected   Fecal Occult Blood Immunoassy    Specimen: Stool   Result Value Ref Range    Fecal Occult Blood Immunoassay Positive (A) Negative   Urinalysis with Reflex Culture and Microscopic   Result Value Ref Range    Color, Urine Yellow Straw, Yellow    Appearance, Urine Hazy (N) Clear    Specific Gravity, Urine 1.038 (N) 1.005 - 1.035    pH, Urine 5.0 5.0, 5.5, 6.0, 6.5, 7.0, 7.5, 8.0    Protein, Urine 30 (1+) (N) NEGATIVE mg/dL    Glucose, Urine NEGATIVE NEGATIVE mg/dL    Blood, Urine NEGATIVE NEGATIVE    Ketones, Urine NEGATIVE NEGATIVE mg/dL    Bilirubin, Urine NEGATIVE NEGATIVE    Urobilinogen, Urine <2.0 <2.0 mg/dL    Nitrite, Urine POSITIVE (A) NEGATIVE    Leukocyte Esterase, Urine MODERATE (2+) (A) NEGATIVE   Microscopic Only, Urine   Result Value Ref Range    WBC, Urine >50 (A) 1-5, NONE /HPF    RBC, Urine 3-5 NONE, 1-2, 3-5 /HPF    Bacteria, Urine 2+ (A) NONE SEEN /HPF    Mucus, Urine 1+ Reference range not established. /LPF    Hyaline Casts, Urine 2+ (A) NONE /LPF     CT abdomen pelvis w IV contrast    Result Date: 4/21/2024  Interpreted By:  Brian Broussard, STUDY: CT ABDOMEN PELVIS W IV CONTRAST;  4/21/2024 12:37 pm   INDICATION: Signs/Symptoms:rlq pain, tender.   COMPARISON: 06/06/2023   ACCESSION NUMBER(S): [id number]   ORDERING CLINICIAN: SAMUEL JIN   TECHNIQUE: Contiguous axial images were obtained through the abdomen and pelvis following the intravenous administration of 75 cc Omnipaque 350. Coronal and sagittal reconstructions were performed.   FINDINGS: LOWER CHEST: Images through the lung bases  demonstrate mild areas of atelectasis and/or scarring. Small hiatal hernia.   ABDOMEN AND PELVIS:   LIVER: Stable cysts in the  left hepatic lobe.   BILE DUCTS: Not abnormally dilated.   GALLBLADDER: No calcified stones. No wall thickening.   PANCREAS: Appears unremarkable.   SPLEEN: Appears unremarkable.   ADRENAL GLANDS: Stable prominence of the left adrenal gland. Right adrenal gland remains unremarkable.   KIDNEYS, URETERS, AND BLADDER: The kidneys enhance with contrast material symmetrically. There is no evidence of hydronephrosis. Stable cysts in the kidneys, measuring up to 6.1 cm on the left. Nonobstructing calculi in the inferior pole of the left kidney measuring up to 12 mm in size. 1 mm nonobstructing calculus within the midpole of the right kidney. There is urinary bladder prolapse, similar to the prior study. Urinary bladder is otherwise unremarkable and unchanged.   BOWEL: Colonic diverticulosis. No definite evidence of diverticulitis. There is mild prominence of the walls of the colon most likely related to incomplete distention. Colitis felt less likely. There is proliferation of the submucosal fat of a portion of the distal/terminal ileum, as well as the ascending colon, nonspecific, but can be associated with chronic inflammation. There is no evidence of a bowel obstruction.  Appendix is not identified. Although CT has limited sensitivity and specificity for gastric pathology, the stomach appears grossly unremarkable.   RETROPERITONEUM, VESSELS: There is no aneurysmal dilatation of the abdominal aorta. The IVC is within normal limits.  There are atherosclerotic calcifications of the aorta and its branches. No pathologically enlarged retroperitoneal lymph nodes are noted.   PERITONEUM: There is no evidence of pneumoperitoneum.  No ascites or loculated fluid collection noted.  No pathologically enlarged mesenteric lymph nodes are identified.   ABDOMINAL WALL, SOFT TISSUES: There is a fat containing anterior abdominal wall hernia in the right lower quadrant, likely a spigelian hernia. The herniated fat is inflamed, suggesting  strangulation. Small fat containing left inguinal hernia noted and unchanged.   SKELETON: Osteopenia. Degenerate changes. No acute process.       Small fat containing right lower quadrant spigelian hernia which demonstrates significant inflammation of the herniated fat, suggesting strangulation. Prominence of the walls of the colon most likely relates to incomplete distention. Colitis felt less likely. Nonobstructive nephrolithiasis.   MACRO: None.   Signed by: Brian Broussard 4/21/2024 1:02 PM Dictation workstation:   [id number]        Assessment/Plan     Patient has incarcerated spigalean hernia.  She has been counseled in the emergency room.  She will be taken to the operating room for diagnostic laparoscopy to be followed by spigalean hernia repair.  She is counseled for any associated procedures to include bowel resection and ostomy.    I spent 20 minutes in the professional and overall care of this patient.      Sumeet Cespedes MD

## 2024-04-22 ENCOUNTER — PHARMACY VISIT (OUTPATIENT)
Dept: PHARMACY | Facility: CLINIC | Age: 84
End: 2024-04-22
Payer: MEDICARE

## 2024-04-22 VITALS
SYSTOLIC BLOOD PRESSURE: 105 MMHG | HEIGHT: 62 IN | HEART RATE: 62 BPM | OXYGEN SATURATION: 94 % | BODY MASS INDEX: 31.24 KG/M2 | TEMPERATURE: 97.6 F | WEIGHT: 169.75 LBS | DIASTOLIC BLOOD PRESSURE: 65 MMHG | RESPIRATION RATE: 18 BRPM

## 2024-04-22 LAB
ALBUMIN SERPL BCP-MCNC: 3.3 G/DL (ref 3.4–5)
ALP SERPL-CCNC: 63 U/L (ref 33–136)
ALT SERPL W P-5'-P-CCNC: 27 U/L (ref 7–45)
ANION GAP SERPL CALC-SCNC: 13 MMOL/L (ref 10–20)
AST SERPL W P-5'-P-CCNC: 13 U/L (ref 9–39)
BILIRUB SERPL-MCNC: 0.3 MG/DL (ref 0–1.2)
BUN SERPL-MCNC: 19 MG/DL (ref 6–23)
C COLI+JEJ+UPSA DNA STL QL NAA+PROBE: NOT DETECTED
CALCIUM SERPL-MCNC: 8 MG/DL (ref 8.6–10.3)
CHLORIDE SERPL-SCNC: 105 MMOL/L (ref 98–107)
CO2 SERPL-SCNC: 23 MMOL/L (ref 21–32)
CREAT SERPL-MCNC: 1 MG/DL (ref 0.5–1.05)
EC STX1 GENE STL QL NAA+PROBE: NOT DETECTED
EC STX2 GENE STL QL NAA+PROBE: NOT DETECTED
EGFRCR SERPLBLD CKD-EPI 2021: 56 ML/MIN/1.73M*2
ERYTHROCYTE [DISTWIDTH] IN BLOOD BY AUTOMATED COUNT: 12.9 % (ref 11.5–14.5)
GLUCOSE BLD MANUAL STRIP-MCNC: 130 MG/DL (ref 74–99)
GLUCOSE BLD MANUAL STRIP-MCNC: 141 MG/DL (ref 74–99)
GLUCOSE SERPL-MCNC: 145 MG/DL (ref 74–99)
HCT VFR BLD AUTO: 41.1 % (ref 36–46)
HGB BLD-MCNC: 13.5 G/DL (ref 12–16)
HOLD SPECIMEN: NORMAL
MCH RBC QN AUTO: 31.2 PG (ref 26–34)
MCHC RBC AUTO-ENTMCNC: 32.8 G/DL (ref 32–36)
MCV RBC AUTO: 95 FL (ref 80–100)
NOROVIRUS GI + GII RNA STL NAA+PROBE: NOT DETECTED
NRBC BLD-RTO: 0 /100 WBCS (ref 0–0)
PLATELET # BLD AUTO: 301 X10*3/UL (ref 150–450)
POTASSIUM SERPL-SCNC: 3.3 MMOL/L (ref 3.5–5.3)
PROT SERPL-MCNC: 5.8 G/DL (ref 6.4–8.2)
RBC # BLD AUTO: 4.33 X10*6/UL (ref 4–5.2)
RV RNA STL NAA+PROBE: NOT DETECTED
SALMONELLA DNA STL QL NAA+PROBE: NOT DETECTED
SHIGELLA DNA SPEC QL NAA+PROBE: NOT DETECTED
SODIUM SERPL-SCNC: 138 MMOL/L (ref 136–145)
V CHOLERAE DNA STL QL NAA+PROBE: NOT DETECTED
WBC # BLD AUTO: 8.8 X10*3/UL (ref 4.4–11.3)
Y ENTEROCOL DNA STL QL NAA+PROBE: NOT DETECTED

## 2024-04-22 PROCEDURE — 85027 COMPLETE CBC AUTOMATED: CPT | Performed by: STUDENT IN AN ORGANIZED HEALTH CARE EDUCATION/TRAINING PROGRAM

## 2024-04-22 PROCEDURE — RXMED WILLOW AMBULATORY MEDICATION CHARGE

## 2024-04-22 PROCEDURE — 2500000004 HC RX 250 GENERAL PHARMACY W/ HCPCS (ALT 636 FOR OP/ED): Performed by: STUDENT IN AN ORGANIZED HEALTH CARE EDUCATION/TRAINING PROGRAM

## 2024-04-22 PROCEDURE — 82947 ASSAY GLUCOSE BLOOD QUANT: CPT

## 2024-04-22 PROCEDURE — 2500000006 HC RX 250 W HCPCS SELF ADMINISTERED DRUGS (ALT 637 FOR ALL PAYERS): Performed by: FAMILY MEDICINE

## 2024-04-22 PROCEDURE — 2500000001 HC RX 250 WO HCPCS SELF ADMINISTERED DRUGS (ALT 637 FOR MEDICARE OP): Performed by: STUDENT IN AN ORGANIZED HEALTH CARE EDUCATION/TRAINING PROGRAM

## 2024-04-22 PROCEDURE — G0378 HOSPITAL OBSERVATION PER HR: HCPCS

## 2024-04-22 PROCEDURE — 80053 COMPREHEN METABOLIC PANEL: CPT | Performed by: STUDENT IN AN ORGANIZED HEALTH CARE EDUCATION/TRAINING PROGRAM

## 2024-04-22 PROCEDURE — 99232 SBSQ HOSP IP/OBS MODERATE 35: CPT | Performed by: FAMILY MEDICINE

## 2024-04-22 PROCEDURE — 36415 COLL VENOUS BLD VENIPUNCTURE: CPT | Performed by: STUDENT IN AN ORGANIZED HEALTH CARE EDUCATION/TRAINING PROGRAM

## 2024-04-22 RX ORDER — TRAMADOL HYDROCHLORIDE 50 MG/1
50 TABLET ORAL EVERY 8 HOURS PRN
Qty: 6 TABLET | Refills: 0 | Status: SHIPPED | OUTPATIENT
Start: 2024-04-22

## 2024-04-22 RX ORDER — POTASSIUM CHLORIDE 20 MEQ/1
40 TABLET, EXTENDED RELEASE ORAL ONCE
Status: COMPLETED | OUTPATIENT
Start: 2024-04-22 | End: 2024-04-22

## 2024-04-22 RX ORDER — POTASSIUM CHLORIDE 1.5 G/1.58G
20 POWDER, FOR SOLUTION ORAL ONCE
Status: DISCONTINUED | OUTPATIENT
Start: 2024-04-22 | End: 2024-04-22 | Stop reason: HOSPADM

## 2024-04-22 RX ORDER — DEXTROMETHORPHAN HYDROBROMIDE, GUAIFENESIN 5; 100 MG/5ML; MG/5ML
1300 LIQUID ORAL DAILY
COMMUNITY

## 2024-04-22 RX ORDER — DOCUSATE SODIUM 100 MG/1
100 CAPSULE, LIQUID FILLED ORAL 2 TIMES DAILY
Qty: 10 CAPSULE | Refills: 0 | Status: SHIPPED | OUTPATIENT
Start: 2024-04-22 | End: 2024-04-27

## 2024-04-22 RX ORDER — TALC
250 POWDER (GRAM) TOPICAL DAILY
COMMUNITY

## 2024-04-22 RX ADMIN — PANTOPRAZOLE SODIUM 40 MG: 40 TABLET, DELAYED RELEASE ORAL at 06:14

## 2024-04-22 RX ADMIN — SODIUM CHLORIDE 100 ML/HR: 4.5 INJECTION, SOLUTION INTRAVENOUS at 06:14

## 2024-04-22 RX ADMIN — AMLODIPINE BESYLATE 2.5 MG: 2.5 TABLET ORAL at 09:04

## 2024-04-22 RX ADMIN — FLUTICASONE FUROATE AND VILANTEROL TRIFENATATE 1 PUFF: 200; 25 POWDER RESPIRATORY (INHALATION) at 06:38

## 2024-04-22 RX ADMIN — METOPROLOL SUCCINATE 100 MG: 50 TABLET, EXTENDED RELEASE ORAL at 09:04

## 2024-04-22 RX ADMIN — ACETAMINOPHEN 650 MG: 325 TABLET ORAL at 09:04

## 2024-04-22 RX ADMIN — LEVOTHYROXINE SODIUM 50 MCG: 50 TABLET ORAL at 06:14

## 2024-04-22 RX ADMIN — ACETAMINOPHEN 650 MG: 325 TABLET ORAL at 01:44

## 2024-04-22 RX ADMIN — MORPHINE SULFATE 2 MG: 2 INJECTION, SOLUTION INTRAMUSCULAR; INTRAVENOUS at 01:45

## 2024-04-22 RX ADMIN — POTASSIUM CHLORIDE 40 MEQ: 1500 TABLET, EXTENDED RELEASE ORAL at 09:05

## 2024-04-22 RX ADMIN — HYDROCHLOROTHIAZIDE 25 MG: 25 TABLET ORAL at 09:05

## 2024-04-22 RX ADMIN — FLUTICASONE FUROATE AND VILANTEROL TRIFENATATE 1 PUFF: 200; 25 POWDER RESPIRATORY (INHALATION) at 09:05

## 2024-04-22 RX ADMIN — LOSARTAN POTASSIUM 100 MG: 50 TABLET, FILM COATED ORAL at 09:04

## 2024-04-22 ASSESSMENT — PAIN - FUNCTIONAL ASSESSMENT
PAIN_FUNCTIONAL_ASSESSMENT: 0-10

## 2024-04-22 ASSESSMENT — PAIN SCALES - GENERAL
PAINLEVEL_OUTOF10: 0 - NO PAIN
PAINLEVEL_OUTOF10: 6
PAINLEVEL_OUTOF10: 0 - NO PAIN
PAINLEVEL_OUTOF10: 0 - NO PAIN

## 2024-04-22 ASSESSMENT — COGNITIVE AND FUNCTIONAL STATUS - GENERAL
MOBILITY SCORE: 24
DAILY ACTIVITIY SCORE: 24

## 2024-04-22 ASSESSMENT — ACTIVITIES OF DAILY LIVING (ADL): LACK_OF_TRANSPORTATION: NO

## 2024-04-22 ASSESSMENT — PAIN DESCRIPTION - LOCATION: LOCATION: ABDOMEN

## 2024-04-22 NOTE — PROGRESS NOTES
Physical Therapy SCREEN                 Therapy Communication Note    Patient Name: Tania Guerrero  MRN: [id number]  Today's Date: 4/22/2024     Discipline: Physical Therapy SCREEN/DISCH    Comment: pt seen in 2003 dangling EOB, alert & conversant. Pt states she has already been ambulatory with her own cane (staff supv) without difficulty. Pt is wearing abd binder, demos indep sit<>stand, amb around room ~40 ft with her own cane and no assist needed, good balance. Pt does have chronic gait deficit HAJA knee stiffness d/t advanced OA but does not want to have TKA's. Pt describes home set up: stays 1st floor, 3 separate steps entry--uses cane without difficulty, completely indep at baseline and mobility does not appear much lower than baseline despite ABD surg 4/21.    No need for Skilled PT.

## 2024-04-22 NOTE — PROGRESS NOTES
Tania Guerrero is a 84 y.o. female on day 1 of admission presenting with Incarcerated hernia.      Subjective   84 y.o. female with PMHx s/f osteoporosis, hypothyroidism, HTN, Hld T2DM, COPD, CKD presenting with R inguinal area pain and mass. Pt states she has had a mass here for a long time, but never sought out medical attention for it. Over the past few days it started hurting more so she came to the hospital. No nausea, vomiting, lightheadedness, dizziness, or syncope. She admits to chronic loose stool, but no recent changes or additional hematochezia. She did take her AM medications this morning but hasn't eaten anything since.     ED Course (Summary):   Vitals on presentation: 97.3 F, 111 bpm, 16 rr, 139/69, 98% on RA  Labs: CMP glu 172, K 3.1, Cr 1.25  Mag 1.71, lactate 1.1, Lipase 29  CBC WBC 8.8, Hg 16.1, Plt 372  UA hazy, 1+ protein, positive nitrite, moderate leuk est  Positive FOBT  Imaging: CT a/p with contrast - small fat containing RLQ spigelian hernia which significant inflammation of the herniated fat, suggesting strangulation. Agree with interpretation.  Interventions: NS 1 L bolus, Clindamycin, Surgery was consulted and recommended emergent surgery.       4/22:                Patient seen POD #1 in the presence of her son.  She is sitting on the edge of the bed and states she is feeling very little postoperative discomfort and anxious for discharge home.  Appears to be taking diet well.  General surgery feels suitable for discharge.  Otherwise denies interval new symptoms or complaints including chest pain palpitations pleuritic type pain unusual shortness of breath cough sputum nausea abdominal pain flank pain dysuria diarrhea fever or chills.       Objective     Last Recorded Vitals  /65 (BP Location: Left arm, Patient Position: Sitting)   Pulse 62   Temp 36.4 °C (97.6 °F) (Temporal)   Resp 18   Wt 77 kg (169 lb 12.1 oz)   SpO2 94%   Intake/Output last 3 Shifts:    Intake/Output  Summary (Last 24 hours) at 4/22/2024 1817  Last data filed at 4/22/2024 1115  Gross per 24 hour   Intake 1924 ml   Output 300 ml   Net 1624 ml       Admission Weight  Weight: 77.1 kg (170 lb) (04/21/24 0928)    Daily Weight  04/22/24 : 77 kg (169 lb 12.1 oz)    Image Results  CT abdomen pelvis w IV contrast  Narrative: Interpreted By:  Brian Broussard,   STUDY:  CT ABDOMEN PELVIS W IV CONTRAST;  4/21/2024 12:37 pm      INDICATION:  Signs/Symptoms:rlq pain, tender.      COMPARISON:  06/06/2023      ACCESSION NUMBER(S):  [id number]      ORDERING CLINICIAN:  SAMUEL JIN      TECHNIQUE:  Contiguous axial images were obtained through the abdomen and pelvis  following the intravenous administration of 75 cc Omnipaque 350.  Coronal and sagittal reconstructions were performed.      FINDINGS:  LOWER CHEST:  Images through the lung bases  demonstrate mild areas of atelectasis  and/or scarring. Small hiatal hernia.      ABDOMEN AND PELVIS:      LIVER:  Stable cysts in the left hepatic lobe.      BILE DUCTS:  Not abnormally dilated.      GALLBLADDER:  No calcified stones. No wall thickening.      PANCREAS:  Appears unremarkable.      SPLEEN:  Appears unremarkable.      ADRENAL GLANDS:  Stable prominence of the left adrenal gland. Right adrenal gland  remains unremarkable.      KIDNEYS, URETERS, AND BLADDER:  The kidneys enhance with contrast material symmetrically. There is no  evidence of hydronephrosis. Stable cysts in the kidneys, measuring up  to 6.1 cm on the left. Nonobstructing calculi in the inferior pole of  the left kidney measuring up to 12 mm in size. 1 mm nonobstructing  calculus within the midpole of the right kidney. There is urinary  bladder prolapse, similar to the prior study. Urinary bladder is  otherwise unremarkable and unchanged.      BOWEL:  Colonic diverticulosis. No definite evidence of diverticulitis. There  is mild prominence of the walls of the colon most likely related to  incomplete distention.  Colitis felt less likely. There is  proliferation of the submucosal fat of a portion of the  distal/terminal ileum, as well as the ascending colon, nonspecific,  but can be associated with chronic inflammation. There is no evidence  of a bowel obstruction.  Appendix is not identified. Although CT has  limited sensitivity and specificity for gastric pathology, the  stomach appears grossly unremarkable.      RETROPERITONEUM, VESSELS:  There is no aneurysmal dilatation of the abdominal aorta. The IVC is  within normal limits.  There are atherosclerotic calcifications of  the aorta and its branches. No pathologically enlarged  retroperitoneal lymph nodes are noted.      PERITONEUM:  There is no evidence of pneumoperitoneum.  No ascites or loculated  fluid collection noted.  No pathologically enlarged mesenteric lymph  nodes are identified.      ABDOMINAL WALL, SOFT TISSUES:  There is a fat containing anterior abdominal wall hernia in the right  lower quadrant, likely a spigelian hernia. The herniated fat is  inflamed, suggesting strangulation. Small fat containing left  inguinal hernia noted and unchanged.      SKELETON:  Osteopenia. Degenerate changes. No acute process.      Impression: Small fat containing right lower quadrant spigelian hernia which  demonstrates significant inflammation of the herniated fat,  suggesting strangulation. Prominence of the walls of the colon most  likely relates to incomplete distention. Colitis felt less likely.  Nonobstructive nephrolithiasis.      MACRO:  None.      Signed by: Brian Broussard 4/21/2024 1:02 PM  Dictation workstation:   [id number]      Physical Exam  Constitutional:       General: She is in acute distress.      Appearance: Normal appearance. She is normal weight. She is not ill-appearing or toxic-appearing.   HENT:      Head: Normocephalic and atraumatic.      Mouth/Throat:      Mouth: Mucous membranes are moist.      Pharynx: No posterior oropharyngeal erythema.   Eyes:       Extraocular Movements: Extraocular movements intact.      Pupils: Pupils are equal, round, and reactive to light.   Cardiovascular:      Rate and Rhythm: Normal rate and regular rhythm.      Heart sounds: Normal heart sounds. No murmur heard.     No friction rub. No gallop.   Pulmonary:      Effort: Pulmonary effort is normal. No respiratory distress.      Breath sounds: Normal breath sounds. No stridor. No wheezing, rhonchi or rales.   Abdominal:      General: Abdomen is flat. Bowel sounds are normal. There is no distension.      Palpations: Abdomen is soft.      Tenderness: There is no abdominal tenderness. There is no right CVA tenderness, left CVA tenderness, guarding or rebound.      Comments: R inguinal area mildly tender to palpation   Musculoskeletal:         General: No swelling, tenderness, deformity or signs of injury. Normal range of motion.      Right lower leg: No edema.      Left lower leg: No edema.   Skin:     General: Skin is warm and dry.      Coloration: Skin is not jaundiced or pale.      Findings: No bruising, erythema, lesion or rash.   Neurological:      General: No focal deficit present.      Mental Status: She is alert and oriented to person, place, and time. Mental status is at baseline.      Cranial Nerves: No cranial nerve deficit.      Sensory: No sensory deficit.      Motor: No weakness.   Psychiatric:         Mood and Affect: Mood normal.         Behavior: Behavior normal.         Thought Content: Thought content normal.         Judgment: Judgment normal.         Relevant Results               Assessment/Plan                  Principal Problem:    Incarcerated hernia  Active Problems:    Hypothyroid    Chronic kidney disease, stage 3a (Multi)    Diet-controlled type 2 diabetes mellitus (Multi)    Hyperlipidemia    Hypertension    Hypokalemia    COPD (chronic obstructive pulmonary disease) (Multi)    Tachycardia    Hyperglycemia    PONV (postoperative nausea and vomiting)     Irreducible Spigelian hernia    Incarcerated hernia:  Surgery consulted.  NPO  Pain control  4/22: POD #1 from hernia repair.  Appears to be doing well and surgery feels suitable for discharge home.  Will follow-up with surgery as outpatient.     HTN:  Hold PO bp meds. PRN Hydralazine ordered IV while NPO     UTI:  Ucx pending.  Rocephin     Hypokalemia:  K runs ordered. Recheck in AM.     COPD:  Home inhalers     DM2:  SSI     Hypothyroid:  Restart PO home Synthroid after surgery                 Enrique Cueto MD

## 2024-04-22 NOTE — DISCHARGE SUMMARY
Discharge Diagnosis  Incarcerated hernia    Issues Requiring Follow-Up  Post-op follow up    Test Results Pending At Discharge  Pending Labs       Order Current Status    Cryptosporidium Antigen, Stool In process    Giardia Antigen In process    Ova and Parasite Examination In process    Ova/Para + Giardia/Cryptosporidium Antigen In process    Surgical Pathology Exam In process    Urine Culture In process            Hospital Course   Tania Guerrero is a 84 year old female who presented with several days of right lower quadrant abdominal pain and was found to have a Spigelian hernia. Patient was taken to the OR for repair for which she tolerated. For full account of the procedure, see separate operative note. The following day, patient was doing well with her pain well controlled. She was tolerating a diet without issue. She was voiding and was able to ambulate without issue. At this time, she is appropriate to discharge home.    Pertinent Physical Exam At Time of Discharge  Physical Exam  Vitals reviewed.   Constitutional:       General: She is not in acute distress.  HENT:      Head: Normocephalic and atraumatic.   Eyes:      Conjunctiva/sclera: Conjunctivae normal.   Cardiovascular:      Rate and Rhythm: Normal rate and regular rhythm.      Pulses: Normal pulses.   Pulmonary:      Effort: Pulmonary effort is normal. No respiratory distress.   Abdominal:      Palpations: Abdomen is soft.      Comments: Non-tender, non-distended. Incision dressings intact, clean, and dry.    Musculoskeletal:         General: No swelling. Normal range of motion.      Cervical back: Neck supple.   Skin:     General: Skin is warm and dry.   Neurological:      General: No focal deficit present.      Mental Status: She is alert and oriented to person, place, and time.         Home Medications     Medication List      START taking these medications     docusate sodium 100 mg capsule; Commonly known as: Colace; Take 1   capsule (100 mg)  by mouth 2 times a day for 5 days.   traMADol 50 mg tablet; Commonly known as: Ultram; Take 1 tablet (50 mg)   by mouth every 8 hours if needed for severe pain (7 - 10).     CHANGE how you take these medications     acetaminophen 650 mg ER tablet; Commonly known as: Tylenol 8 HOUR; What   changed: Another medication with the same name was removed. Continue   taking this medication, and follow the directions you see here.     CONTINUE taking these medications     amLODIPine 2.5 mg tablet; Commonly known as: Norvasc; Take 1 tablet (2.5   mg) by mouth once daily.   aspirin 81 mg EC tablet   cholecalciferol 50 mcg (2,000 unit) capsule; Commonly known as: Vitamin   D-3   Cinnamon 500 mg capsule; Generic drug: cinnamon   coenzyme Q10 100 mg tablet   fish oil concentrate 120-180 mg capsule; Commonly known as: Omega-3   fluticasone-umeclidin-vilanter 200-62.5-25 mcg blister with device;   Commonly known as: TRELEGY-ELLIPTA; Inhale 1 puff once daily in the   morning. Take before meals.   GARLIC PO   TEO OIL ORAL   latanoprost 0.005 % ophthalmic solution; Commonly known as: Xalatan   levothyroxine 50 mcg tablet; Commonly known as: Synthroid, Levoxyl; Take   1 tablet (50 mcg) by mouth once daily.   losartan-hydrochlorothiazide 100-25 mg tablet; Commonly known as:   Hyzaar; Take 1 tablet by mouth once daily.   magnesium oxide 250 mg magnesium tablet; Commonly known as: Mag-Ox   metoprolol succinate  mg 24 hr tablet; Commonly known as:   Toprol-XL; Take 1 tablet (100 mg) by mouth once daily.   multivitamin tablet   potassium gluconate 595 mg (99 mg) tablet   simvastatin 10 mg tablet; Commonly known as: Zocor; Take 0.5 tablets (5   mg) by mouth once daily at bedtime.   TURMERIC ORAL   Ventolin HFA 90 mcg/actuation inhaler; Generic drug: albuterol       Outpatient Follow-Up  Future Appointments   Date Time Provider Department [place]   8/9/2024 11:20 AM Sue Murray MD [id number] [facility]   11/29/2024 11:00 AM Rebel WATERS  DO Jovanna [id number] [facility]   2/11/2025 10:40 AM [facility]       Jana Alegre DO

## 2024-04-22 NOTE — PROGRESS NOTES
04/22/24 1055   Discharge Planning   Living Arrangements Alone   Support Systems Children   Assistance Needed Independent with Cane   Type of Residence Private residence   Home or Post Acute Services None   Patient expects to be discharged to: Home   Does the patient need discharge transport arranged? No   Financial Resource Strain   How hard is it for you to pay for the very basics like food, housing, medical care, and heating? Not hard   Housing Stability   In the last 12 months, was there a time when you were not able to pay the mortgage or rent on time? N   In the last 12 months, was there a time when you did not have a steady place to sleep or slept in a shelter (including now)? N   Transportation Needs   In the past 12 months, has lack of transportation kept you from medical appointments or from getting medications? no   In the past 12 months, has lack of transportation kept you from meetings, work, or from getting things needed for daily living? No     PCP is Sue Murray MD. Patient is from home alone with support from her son whom lives across the street. Patient is independent with a cane on ambulation, self care, driving, shopping, and meals.  PT Pending. Patient declining HHC if recommended. Patient prefers to return home and denies any home going needs. TCC to follow.

## 2024-04-22 NOTE — PROGRESS NOTES
Tania Guerrero is a 84 y.o. female admitted for Incarcerated hernia. Pharmacy reviewed the patient's [id number] medications and allergies for accuracy.    The list below reflects the PTA list prior to pharmacy medication history. A summary a changes to the PTA medication list has been listed below. Please review each medication in order reconciliation for additional clarification and justification.    Source of information:  PATIENT    Medications added:   1 every day  GARLIC 1 every day  ACETAMINOPHEN 650MG 2 every day AM  TURMERIC 1 QD    Medications modified:    Medications to be removed:  ACETAMINOPHEN 500MG    Medications of concern:      Prior to Admission Medications   Prescriptions Last Dose Informant Patient Reported? Taking?   Cinnamon 500 mg capsule   Yes No   Sig: Take by mouth.   TEO OIL ORAL   Yes No   Sig: Take 1 capsule by mouth once daily.   acetaminophen (Tylenol) 500 mg tablet   No No   Sig: TAKE 2 TABLETS BY MOUTH EVERY 6 HOURS   albuterol (Ventolin HFA) 90 mcg/actuation inhaler   Yes No   Sig: Inhale 1 puff every 4 hours if needed for wheezing or shortness of breath.   amLODIPine (Norvasc) 2.5 mg tablet   No No   Sig: Take 1 tablet (2.5 mg) by mouth once daily.   aspirin 81 mg EC tablet   Yes No   Sig: Take 1 tablet (81 mg) by mouth once daily.   cholecalciferol (Vitamin D-3) 50 mcg (2,000 unit) capsule   Yes No   Sig: Take by mouth.   fish oil concentrate (Omega-3) 120-180 mg capsule   Yes No   Sig: Take 1 capsule (1 g) by mouth once daily.   fluticasone-umeclidin-vilanter (TRELEGY-ELLIPTA) 200-62.5-25 mcg blister with device   No No   Sig: Inhale 1 puff once daily in the morning. Take before meals.   latanoprost (Xalatan) 0.005 % ophthalmic solution   Yes No   Sig: Administer into affected eye(s).   levothyroxine (Synthroid, Levoxyl) 50 mcg tablet   No No   Sig: Take 1 tablet (50 mcg) by mouth once daily.   losartan-hydrochlorothiazide (Hyzaar) 100-25 mg tablet   No No    Sig: Take 1 tablet by mouth once daily.   metoprolol succinate XL (Toprol-XL) 100 mg 24 hr tablet   No No   Sig: Take 1 tablet (100 mg) by mouth once daily.   multivitamin tablet   Yes No   Sig: Take 1 tablet by mouth once daily.   potassium gluconate 595 mg (99 mg) tablet   Yes No   Sig: Take by mouth.   simvastatin (Zocor) 10 mg tablet   No No   Sig: Take 0.5 tablets (5 mg) by mouth once daily at bedtime.   ubidecarenone (coenzyme Q10) 100 mg tablet   Yes No   Sig: Take by mouth.      Facility-Administered Medications: None       Kya Hensley

## 2024-04-22 NOTE — PROGRESS NOTES
Consult placed to [facility] for coordinating care plan, SW discussed with TCC. No SW needs identified at this time, SW signing off,  pt and care team aware of SW availability while inpt.     Kevin Block, MSW, LSW ([id number])   Care Transitions

## 2024-04-23 ENCOUNTER — PATIENT OUTREACH (OUTPATIENT)
Dept: CARE COORDINATION | Facility: CLINIC | Age: 84
End: 2024-04-23
Payer: MEDICARE

## 2024-04-23 LAB
CRYPTOSP AG STL QL IA: NEGATIVE
G LAMBLIA AG STL QL IA: NEGATIVE
O+P STL MICRO: NEGATIVE

## 2024-04-24 LAB — BACTERIA UR CULT: ABNORMAL

## 2024-04-26 LAB
LABORATORY COMMENT REPORT: NORMAL
PATH REPORT.FINAL DX SPEC: NORMAL
PATH REPORT.GROSS SPEC: NORMAL
PATH REPORT.RELEVANT HX SPEC: NORMAL
PATH REPORT.TOTAL CANCER: NORMAL

## 2024-04-30 DIAGNOSIS — J44.9 CHRONIC OBSTRUCTIVE PULMONARY DISEASE, UNSPECIFIED COPD TYPE (MULTI): Primary | ICD-10-CM

## 2024-05-01 ENCOUNTER — TELEPHONE (OUTPATIENT)
Dept: PRIMARY CARE | Facility: CLINIC | Age: 84
End: 2024-05-01
Payer: MEDICARE

## 2024-05-01 DIAGNOSIS — M17.0 PRIMARY OSTEOARTHRITIS OF BOTH KNEES: ICD-10-CM

## 2024-05-01 NOTE — TELEPHONE ENCOUNTER
Patient calling for a new order for her handicappped placard whick expires on May 31.        Mail to:  [address] [facility] [address]

## 2024-07-30 ENCOUNTER — LAB (OUTPATIENT)
Dept: LAB | Facility: LAB | Age: 84
End: 2024-07-30
Payer: MEDICARE

## 2024-07-30 DIAGNOSIS — N18.31 CHRONIC KIDNEY DISEASE, STAGE 3A (MULTI): ICD-10-CM

## 2024-07-30 DIAGNOSIS — I10 PRIMARY HYPERTENSION: ICD-10-CM

## 2024-07-30 DIAGNOSIS — E03.9 HYPOTHYROIDISM, UNSPECIFIED TYPE: ICD-10-CM

## 2024-07-30 DIAGNOSIS — E66.9 OBESITY (BMI 30-39.9): ICD-10-CM

## 2024-07-30 DIAGNOSIS — I25.10 ASHD (ARTERIOSCLEROTIC HEART DISEASE): ICD-10-CM

## 2024-07-30 DIAGNOSIS — E78.2 MIXED HYPERLIPIDEMIA: ICD-10-CM

## 2024-07-30 DIAGNOSIS — J44.9 CHRONIC OBSTRUCTIVE PULMONARY DISEASE, UNSPECIFIED COPD TYPE (MULTI): ICD-10-CM

## 2024-07-30 DIAGNOSIS — E11.9 DIET-CONTROLLED TYPE 2 DIABETES MELLITUS (MULTI): ICD-10-CM

## 2024-07-30 LAB
25(OH)D3 SERPL-MCNC: 42 NG/ML (ref 30–100)
ALBUMIN SERPL BCP-MCNC: 3.8 G/DL (ref 3.4–5)
ALP SERPL-CCNC: 80 U/L (ref 33–136)
ALT SERPL W P-5'-P-CCNC: 35 U/L (ref 7–45)
ANION GAP SERPL CALC-SCNC: 14 MMOL/L (ref 10–20)
AST SERPL W P-5'-P-CCNC: 16 U/L (ref 9–39)
BILIRUB SERPL-MCNC: 0.4 MG/DL (ref 0–1.2)
BUN SERPL-MCNC: 19 MG/DL (ref 6–23)
CALCIUM SERPL-MCNC: 8.9 MG/DL (ref 8.6–10.3)
CHLORIDE SERPL-SCNC: 102 MMOL/L (ref 98–107)
CHOLEST SERPL-MCNC: 122 MG/DL (ref 0–199)
CHOLESTEROL/HDL RATIO: 2.3
CO2 SERPL-SCNC: 27 MMOL/L (ref 21–32)
CREAT SERPL-MCNC: 0.91 MG/DL (ref 0.5–1.05)
CREAT UR-MCNC: 62.7 MG/DL (ref 20–320)
EGFRCR SERPLBLD CKD-EPI 2021: 63 ML/MIN/1.73M*2
ERYTHROCYTE [DISTWIDTH] IN BLOOD BY AUTOMATED COUNT: 13.9 % (ref 11.5–14.5)
EST. AVERAGE GLUCOSE BLD GHB EST-MCNC: 148 MG/DL
GLUCOSE SERPL-MCNC: 104 MG/DL (ref 74–99)
HBA1C MFR BLD: 6.8 %
HCT VFR BLD AUTO: 42.3 % (ref 36–46)
HDLC SERPL-MCNC: 54.2 MG/DL
HGB BLD-MCNC: 14 G/DL (ref 12–16)
LDLC SERPL CALC-MCNC: 49 MG/DL
LDLC SERPL DIRECT ASSAY-MCNC: 59 MG/DL (ref 0–129)
MAGNESIUM SERPL-MCNC: 1.86 MG/DL (ref 1.6–2.4)
MCH RBC QN AUTO: 31 PG (ref 26–34)
MCHC RBC AUTO-ENTMCNC: 33.1 G/DL (ref 32–36)
MCV RBC AUTO: 94 FL (ref 80–100)
MICROALBUMIN UR-MCNC: 36.1 MG/L
MICROALBUMIN/CREAT UR: 57.6 UG/MG CREAT
NON HDL CHOLESTEROL: 68 MG/DL (ref 0–149)
NRBC BLD-RTO: 0 /100 WBCS (ref 0–0)
PLATELET # BLD AUTO: 303 X10*3/UL (ref 150–450)
POTASSIUM SERPL-SCNC: 3.7 MMOL/L (ref 3.5–5.3)
PROT SERPL-MCNC: 6.3 G/DL (ref 6.4–8.2)
PTH-INTACT SERPL-MCNC: 75.6 PG/ML (ref 18.5–88)
RBC # BLD AUTO: 4.52 X10*6/UL (ref 4–5.2)
SODIUM SERPL-SCNC: 139 MMOL/L (ref 136–145)
TRIGL SERPL-MCNC: 92 MG/DL (ref 0–149)
TSH SERPL-ACNC: 2.12 MIU/L (ref 0.44–3.98)
VIT B12 SERPL-MCNC: 195 PG/ML (ref 211–911)
VLDL: 18 MG/DL (ref 0–40)
WBC # BLD AUTO: 7.9 X10*3/UL (ref 4.4–11.3)

## 2024-07-30 PROCEDURE — 83721 ASSAY OF BLOOD LIPOPROTEIN: CPT

## 2024-07-30 PROCEDURE — 80053 COMPREHEN METABOLIC PANEL: CPT

## 2024-07-30 PROCEDURE — 85027 COMPLETE CBC AUTOMATED: CPT

## 2024-07-30 PROCEDURE — 84443 ASSAY THYROID STIM HORMONE: CPT

## 2024-07-30 PROCEDURE — 82570 ASSAY OF URINE CREATININE: CPT

## 2024-07-30 PROCEDURE — 83036 HEMOGLOBIN GLYCOSYLATED A1C: CPT

## 2024-07-30 PROCEDURE — 36415 COLL VENOUS BLD VENIPUNCTURE: CPT

## 2024-07-30 PROCEDURE — 83970 ASSAY OF PARATHORMONE: CPT

## 2024-07-30 PROCEDURE — 80061 LIPID PANEL: CPT

## 2024-07-30 PROCEDURE — 82043 UR ALBUMIN QUANTITATIVE: CPT

## 2024-07-30 PROCEDURE — 82306 VITAMIN D 25 HYDROXY: CPT

## 2024-07-30 PROCEDURE — 83735 ASSAY OF MAGNESIUM: CPT

## 2024-07-30 PROCEDURE — 82607 VITAMIN B-12: CPT

## 2024-07-31 DIAGNOSIS — I25.10 ASHD (ARTERIOSCLEROTIC HEART DISEASE): ICD-10-CM

## 2024-07-31 DIAGNOSIS — I10 PRIMARY HYPERTENSION: ICD-10-CM

## 2024-07-31 DIAGNOSIS — E78.2 MIXED HYPERLIPIDEMIA: ICD-10-CM

## 2024-07-31 DIAGNOSIS — E66.9 OBESITY (BMI 30-39.9): ICD-10-CM

## 2024-07-31 DIAGNOSIS — J44.9 CHRONIC OBSTRUCTIVE PULMONARY DISEASE, UNSPECIFIED COPD TYPE (MULTI): ICD-10-CM

## 2024-08-05 DIAGNOSIS — I25.10 ASHD (ARTERIOSCLEROTIC HEART DISEASE): ICD-10-CM

## 2024-08-05 DIAGNOSIS — E78.2 MIXED HYPERLIPIDEMIA: ICD-10-CM

## 2024-08-05 DIAGNOSIS — I10 PRIMARY HYPERTENSION: ICD-10-CM

## 2024-08-05 DIAGNOSIS — J44.9 CHRONIC OBSTRUCTIVE PULMONARY DISEASE, UNSPECIFIED COPD TYPE (MULTI): ICD-10-CM

## 2024-08-05 DIAGNOSIS — E66.9 OBESITY (BMI 30-39.9): ICD-10-CM

## 2024-08-05 NOTE — TELEPHONE ENCOUNTER
8/6/24  1430  Called pt to refill her Losartan-hydrochlorothiazide and wanted to make sure her K+ level is stable. Labs are stable K+ 3.7. Med refill sent in for approval.  Margaret MANE    Patient has some concerns and questions in regards to the Losartan, and the potassium.     losartan-hydrochlorothiazide (Hyzaar) 100-25 mg tablet     Take 1 tablet by mouth once daily     [facility], [facility]. - [facility] [address], [facility] [address]  Phone: [phone]  Fax: [phone]  ESEQUIEL #: --     Patient only has 15 days left.

## 2024-08-06 RX ORDER — LOSARTAN POTASSIUM AND HYDROCHLOROTHIAZIDE 25; 100 MG/1; MG/1
1 TABLET ORAL DAILY
Qty: 90 TABLET | Refills: 1 | Status: SHIPPED | OUTPATIENT
Start: 2024-08-06

## 2024-08-07 PROBLEM — K46.0 INCARCERATED HERNIA: Status: RESOLVED | Noted: 2024-04-21 | Resolved: 2024-08-07

## 2024-08-07 PROBLEM — E53.8 B12 DEFICIENCY: Status: ACTIVE | Noted: 2024-08-07

## 2024-08-07 PROBLEM — R11.2 PONV (POSTOPERATIVE NAUSEA AND VOMITING): Status: RESOLVED | Noted: 2024-04-21 | Resolved: 2024-08-07

## 2024-08-07 PROBLEM — I13.10 HYPERTENSIVE HEART AND KIDNEY DISEASE WITHOUT HEART FAILURE AND WITH STAGE 3A CHRONIC KIDNEY DISEASE (MULTI): Status: ACTIVE | Noted: 2023-06-08

## 2024-08-07 PROBLEM — Z98.890 PONV (POSTOPERATIVE NAUSEA AND VOMITING): Status: RESOLVED | Noted: 2024-04-21 | Resolved: 2024-08-07

## 2024-08-07 PROBLEM — R73.9 HYPERGLYCEMIA: Status: RESOLVED | Noted: 2024-04-21 | Resolved: 2024-08-07

## 2024-08-07 PROBLEM — R00.0 TACHYCARDIA: Status: RESOLVED | Noted: 2024-04-21 | Resolved: 2024-08-07

## 2024-08-07 NOTE — PATIENT INSTRUCTIONS
Please start OTC vitamin b12 1000 mcg daily    I recommend RSV vaccine, Shingrix, and new COVID booster at the pharmacy.    I would like you to follow up in 6 months  Please have all labs that were ordered done at least 1 week prior to your visit.    Recommend healthy diet based around fruits, vegetables, and lean proteins such as chicken, turkey, fish, and beans.  Also include moderate portions of healthy carbohydrates such as wheat bread and pasta, sweet potatoes. Limit sweets and alcoholic beverages. Try not drink more than 100 calories in beverages daily.   It is important to get a protein-rich breakfast daily such as oatmeal, eggs or Greek yogurt.  Increase activity as able to a recommended goal of at least 30 minutes of cardiovascular exercise (walking, swimming, biking, jogging etc.) at least 5 days weekly and a goal of 45 minutes or more most days of the week for weight loss. This exercise can be done all at one time or broken up into 2 or more sessions throughout the day.

## 2024-08-07 NOTE — PROGRESS NOTES
Subjective   Patient ID: Tania Guerrero is a 84 y.o. female who presents for Follow-up.  HPI  Patient presents today for follow up labs and chronic conditions.  Patient feels well. No other complaints or concerns.    The patient's relevant past medical, surgical, family, and social history was reviewed in [facility].  All pertinent lab work and results for this visit were reviewed with patient.    Lab on 07/30/2024   Component Date Value Ref Range Status    Glucose 07/30/2024 104 (H)  74 - 99 mg/dL Final    Sodium 07/30/2024 139  136 - 145 mmol/L Final    Potassium 07/30/2024 3.7  3.5 - 5.3 mmol/L Final    Chloride 07/30/2024 102  98 - 107 mmol/L Final    Bicarbonate 07/30/2024 27  21 - 32 mmol/L Final    Anion Gap 07/30/2024 14  10 - 20 mmol/L Final    Urea Nitrogen 07/30/2024 19  6 - 23 mg/dL Final    Creatinine 07/30/2024 0.91  0.50 - 1.05 mg/dL Final    eGFR 07/30/2024 63  >60 mL/min/1.73m*2 Final    Calculations of estimated GFR are performed using the 2021 CKD-EPI Study Refit equation without the race variable for the IDMS-Traceable creatinine methods.  https://jasn.asnjournals.org/content/early/2021/09/22/ASN.1869112404    Calcium 07/30/2024 8.9  8.6 - 10.3 mg/dL Final    Albumin 07/30/2024 3.8  3.4 - 5.0 g/dL Final    Alkaline Phosphatase 07/30/2024 80  33 - 136 U/L Final    Total Protein 07/30/2024 6.3 (L)  6.4 - 8.2 g/dL Final    AST 07/30/2024 16  9 - 39 U/L Final    Bilirubin, Total 07/30/2024 0.4  0.0 - 1.2 mg/dL Final    ALT 07/30/2024 35  7 - 45 U/L Final    Patients treated with Sulfasalazine may generate falsely decreased results for ALT.    Cholesterol 07/30/2024 122  0 - 199 mg/dL Final          Age      Desirable   Borderline High   High     0-19 Y     0 - 169       170 - 199     >/= 200    20-24 Y     0 - 189       190 - 224     >/= 225         >24 Y     0 - 199       200 - 239     >/= 240   **All ranges are based on fasting samples. Specific   therapeutic targets will vary based on  patient-specific   cardiac risk.    Pediatric guidelines reference:Pediatrics 2011, 128(S5).Adult guidelines reference: NCEP ATPIII Guidelines,SUE 2001, 258:2486-97    Venipuncture immediately after or during the administration of Metamizole may lead to falsely low results. Testing should be performed immediately prior to Metamizole dosing.    HDL-Cholesterol 07/30/2024 54.2  mg/dL Final      Age       Very Low   Low     Normal    High    0-19 Y    < 35      < 40     40-45     ----  20-24 Y    ----     < 40      >45      ----        >24 Y      ----     < 40     40-60      >60      Cholesterol/HDL Ratio 07/30/2024 2.3   Final      Ref Values  Desirable  < 3.4  High Risk  > 5.0    LDL Calculated 07/30/2024 49  <=99 mg/dL Final                                Near   Borderline      AGE      Desirable  Optimal    High     High     Very High     0-19 Y     0 - 109     ---    110-129   >/= 130     ----    20-24 Y     0 - 119     ---    120-159   >/= 160     ----      >24 Y     0 -  99   100-129  130-159   160-189     >/=190      VLDL 07/30/2024 18  0 - 40 mg/dL Final    Triglycerides 07/30/2024 92  0 - 149 mg/dL Final       Age         Desirable   Borderline High   High     Very High   0 D-90 D    19 - 174         ----         ----        ----  91 D- 9 Y     0 -  74        75 -  99     >/= 100      ----    10-19 Y     0 -  89        90 - 129     >/= 130      ----    20-24 Y     0 - 114       115 - 149     >/= 150      ----         >24 Y     0 - 149       150 - 199    200- 499    >/= 500    Venipuncture immediately after or during the administration of Metamizole may lead to falsely low results. Testing should be performed immediately prior to Metamizole dosing.    Non HDL Cholesterol 07/30/2024 68  0 - 149 mg/dL Final          Age       Desirable   Borderline High   High     Very High     0-19 Y     0 - 119       120 - 144     >/= 145    >/= 160    20-24 Y     0 - 149       150 - 189     >/= 190      ----         >24 Y     "30 mg/dL above LDL Cholesterol goal      Magnesium 07/30/2024 1.86  1.60 - 2.40 mg/dL Final    WBC 07/30/2024 7.9  4.4 - 11.3 x10*3/uL Final    nRBC 07/30/2024 0.0  0.0 - 0.0 /100 WBCs Final    RBC 07/30/2024 4.52  4.00 - 5.20 x10*6/uL Final    Hemoglobin 07/30/2024 14.0  12.0 - 16.0 g/dL Final    Hematocrit 07/30/2024 42.3  36.0 - 46.0 % Final    MCV 07/30/2024 94  80 - 100 fL Final    MCH 07/30/2024 31.0  26.0 - 34.0 pg Final    MCHC 07/30/2024 33.1  32.0 - 36.0 g/dL Final    RDW 07/30/2024 13.9  11.5 - 14.5 % Final    Platelets 07/30/2024 303  150 - 450 x10*3/uL Final    Thyroid Stimulating Hormone 07/30/2024 2.12  0.44 - 3.98 mIU/L Final    LDL, Direct 07/30/2024 59  0 - 129 mg/dL Final    Hemoglobin A1C 07/30/2024 6.8 (H)  see below % Final    Estimated Average Glucose 07/30/2024 148  Not Established mg/dL Final    Parathyroid Hormone, Intact 07/30/2024 75.6  18.5 - 88.0 pg/mL Final    Vitamin D, 25-Hydroxy, Total 07/30/2024 42  30 - 100 ng/mL Final    Vitamin B12 07/30/2024 195 (L)  211 - 911 pg/mL Final    Albumin, Urine Random 07/30/2024 36.1  Not established mg/L Final    Creatinine, Urine Random 07/30/2024 62.7  20.0 - 320.0 mg/dL Final    Albumin/Creatinine Ratio 07/30/2024 57.6 (H)  <30.0 ug/mg Creat Final           Review of Systems   A complete review of systems was performed and all systems were normal except what is noted in the HPI.        Objective   /83   Pulse 64   Temp 36.3 °C (97.3 °F)   Ht 1.575 m (5' 2\")   Wt 79.7 kg (175 lb 9.6 oz)   SpO2 97%   BMI 32.12 kg/m²    Physical Exam  Constitutional:       Appearance: Normal appearance. She is obese.   HENT:      Head: Normocephalic and atraumatic.   Neck:      Vascular: No carotid bruit.   Cardiovascular:      Rate and Rhythm: Normal rate and regular rhythm.      Heart sounds: Normal heart sounds.   Pulmonary:      Effort: Pulmonary effort is normal.      Breath sounds: Normal breath sounds. No wheezing, rhonchi or rales.   Abdominal: "      General: Abdomen is flat. Bowel sounds are normal.      Palpations: Abdomen is soft.      Tenderness: There is no abdominal tenderness. There is no guarding.   Musculoskeletal:         General: Normal range of motion.      Right lower leg: No edema.      Left lower leg: No edema.   Skin:     General: Skin is dry.   Neurological:      General: No focal deficit present.      Mental Status: She is alert and oriented to person, place, and time.   Psychiatric:         Mood and Affect: Mood normal.         Behavior: Behavior normal.         Thought Content: Thought content normal.         Health Maintenance Due   Topic Date Due    Diabetes: Foot Exam  Never done    Diabetes: Retinopathy Screening  Never done    Hepatitis A Vaccines (1 of 2 - Risk 2-dose series) Never done    RSV Pregnant patients and/or  patients aged 60+ years (1 - 1-dose 60+ series) Never done    Hepatitis B Vaccines (1 of 3 - Risk 3-dose series) Never done    Zoster Vaccines (2 of 3) 12/13/2018    COVID-19 Vaccine (1 - 2023-24 season) Never done    Influenza Vaccine (1) 09/01/2024        Assessment/Plan   Problem List Items Addressed This Visit       Hypothyroid     Well controlled continue current medication. Reevaluate in 6 months.            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  Continue current medicine and recheck in 6 months.   On ARB  On statin  Recheck 6 months          Relevant Orders    Comprehensive Metabolic Panel    Albumin-Creatinine Ratio, Urine Random    Hemoglobin A1C    Referral to Clinical Pharmacy    Hyperlipidemia     Well controlled. Continue current medicine and recheck in 6 months.            Relevant Orders    Cholesterol, LDL Direct    Lipid Panel    Hypertension - Primary     Well controlled continue current medication. Reevaluate in 6 months.          Relevant Orders    Comprehensive Metabolic Panel    Referral to Clinical Pharmacy    Hypokalemia     Well controlled. Continue current medicine and recheck in 6 months.          Relevant Orders    Comprehensive Metabolic Panel    COPD (chronic obstructive pulmonary disease) (Multi)     Well controlled. Continue current medicine and recheck in 6 months.           Relevant Orders    Referral to Clinical Pharmacy    B12 deficiency     B12 injection today  Start b12 OTC daily as directed   Recheck 6 months          Relevant Medications    cyanocobalamin (Vitamin B-12) injection 1,000 mcg (Start on 8/9/2024 12:30 PM)    Other Relevant Orders    Vitamin B12         Patient understands and agrees with care plan.           Sue Murray MD

## 2024-08-07 NOTE — ASSESSMENT & PLAN NOTE
GFR stable but now with mild microalbuminuria   Start SGLT-2 - clinical pharmacy referral done  On ARB  Low salt diet  Recheck 6 months

## 2024-08-07 NOTE — ASSESSMENT & PLAN NOTE
Well controlled. Continue current medicine and recheck in 6 months.   On ARB  On statin  Recheck 6 months

## 2024-08-09 ENCOUNTER — APPOINTMENT (OUTPATIENT)
Dept: PRIMARY CARE | Facility: CLINIC | Age: 84
End: 2024-08-09
Payer: MEDICARE

## 2024-08-09 VITALS
OXYGEN SATURATION: 97 % | SYSTOLIC BLOOD PRESSURE: 134 MMHG | HEIGHT: 62 IN | WEIGHT: 175.6 LBS | HEART RATE: 64 BPM | DIASTOLIC BLOOD PRESSURE: 83 MMHG | TEMPERATURE: 97.3 F | BODY MASS INDEX: 32.31 KG/M2

## 2024-08-09 DIAGNOSIS — I25.10 ASHD (ARTERIOSCLEROTIC HEART DISEASE): ICD-10-CM

## 2024-08-09 DIAGNOSIS — N18.31 HYPERTENSIVE HEART AND KIDNEY DISEASE WITHOUT HEART FAILURE AND WITH STAGE 3A CHRONIC KIDNEY DISEASE (MULTI): ICD-10-CM

## 2024-08-09 DIAGNOSIS — E78.2 MIXED HYPERLIPIDEMIA: ICD-10-CM

## 2024-08-09 DIAGNOSIS — I13.10 HYPERTENSIVE HEART AND KIDNEY DISEASE WITHOUT HEART FAILURE AND WITH STAGE 3A CHRONIC KIDNEY DISEASE (MULTI): ICD-10-CM

## 2024-08-09 DIAGNOSIS — E03.9 HYPOTHYROIDISM, UNSPECIFIED TYPE: ICD-10-CM

## 2024-08-09 DIAGNOSIS — E53.8 B12 DEFICIENCY: ICD-10-CM

## 2024-08-09 DIAGNOSIS — J44.9 CHRONIC OBSTRUCTIVE PULMONARY DISEASE, UNSPECIFIED COPD TYPE (MULTI): ICD-10-CM

## 2024-08-09 DIAGNOSIS — E11.9 DIET-CONTROLLED TYPE 2 DIABETES MELLITUS (MULTI): ICD-10-CM

## 2024-08-09 DIAGNOSIS — E87.6 HYPOKALEMIA: ICD-10-CM

## 2024-08-09 DIAGNOSIS — I10 PRIMARY HYPERTENSION: Primary | ICD-10-CM

## 2024-08-09 RX ORDER — CYANOCOBALAMIN 1000 UG/ML
1000 INJECTION, SOLUTION INTRAMUSCULAR; SUBCUTANEOUS ONCE
Status: COMPLETED | OUTPATIENT
Start: 2024-08-09 | End: 2024-08-09

## 2024-08-09 RX ORDER — LANOLIN ALCOHOL/MO/W.PET/CERES
1000 CREAM (GRAM) TOPICAL DAILY
COMMUNITY

## 2024-08-09 RX ORDER — LOSARTAN POTASSIUM AND HYDROCHLOROTHIAZIDE 25; 100 MG/1; MG/1
1 TABLET ORAL DAILY
Qty: 90 TABLET | Refills: 1 | OUTPATIENT
Start: 2024-08-09

## 2024-09-11 ENCOUNTER — APPOINTMENT (OUTPATIENT)
Dept: PHARMACY | Facility: HOSPITAL | Age: 84
End: 2024-09-11
Payer: MEDICARE

## 2024-09-11 DIAGNOSIS — I10 PRIMARY HYPERTENSION: ICD-10-CM

## 2024-09-11 DIAGNOSIS — N18.31 HYPERTENSIVE HEART AND KIDNEY DISEASE WITHOUT HEART FAILURE AND WITH STAGE 3A CHRONIC KIDNEY DISEASE (MULTI): ICD-10-CM

## 2024-09-11 DIAGNOSIS — E11.9 DIET-CONTROLLED TYPE 2 DIABETES MELLITUS (MULTI): ICD-10-CM

## 2024-09-11 DIAGNOSIS — I25.10 ASHD (ARTERIOSCLEROTIC HEART DISEASE): ICD-10-CM

## 2024-09-11 DIAGNOSIS — I13.10 HYPERTENSIVE HEART AND KIDNEY DISEASE WITHOUT HEART FAILURE AND WITH STAGE 3A CHRONIC KIDNEY DISEASE (MULTI): ICD-10-CM

## 2024-09-11 DIAGNOSIS — J44.9 CHRONIC OBSTRUCTIVE PULMONARY DISEASE, UNSPECIFIED COPD TYPE (MULTI): ICD-10-CM

## 2024-09-11 NOTE — ASSESSMENT & PLAN NOTE
Patient and I meet today to begin SGLT-2 for CKD management.     We discuss medication therapy in-depth. We discuss progression of CKD and goals of treatment. Patient agrees to starting therapy today but is very wary.     Plan:  START Jardiance 10 mg daily  Counseled patient on MOA, expectations, side effects, duration of therapy, contraindications, administration techniques, and monitoring parameters  Answered all other questions and concerns   Rx to [facility] for PAP

## 2024-09-11 NOTE — ASSESSMENT & PLAN NOTE
Patient's diabetes is currently well-controlled (A1c of 6.8%).    Patient is currently not taking any medications for DM management. We are planning to start Jardiance today for CKD management. This will also help to improve her sugars.     Plan:  CONTINUE diet and lifestyle management.

## 2024-09-11 NOTE — ASSESSMENT & PLAN NOTE
Patient has cost concerns for her Trelegy upon reaching the donut \[facility]". We review her medication therapy today and she states that she has done well overall with the Trelegy. She verbally confirms that she should qualify for  PAP.     Patient is due for influenza, RSV, and PCV20 vaccinations this fall.     Plan:  CONTINUE Trelegy 200-62.5-25 mcg daily  Rx to  [facility] for PAP

## 2024-09-12 ENCOUNTER — TELEPHONE (OUTPATIENT)
Dept: PRIMARY CARE | Facility: CLINIC | Age: 84
End: 2024-09-12
Payer: MEDICARE

## 2024-09-12 NOTE — TELEPHONE ENCOUNTER
Patient wanted to let you know that she is going to hold off on starting the jardiance as there are more potential side effects that she is not comfortable with.

## 2024-09-18 ENCOUNTER — APPOINTMENT (OUTPATIENT)
Dept: PHARMACY | Facility: HOSPITAL | Age: 84
End: 2024-09-18
Payer: MEDICARE

## 2024-09-18 DIAGNOSIS — J44.9 CHRONIC OBSTRUCTIVE PULMONARY DISEASE, UNSPECIFIED COPD TYPE (MULTI): ICD-10-CM

## 2024-09-18 DIAGNOSIS — E11.9 DIET-CONTROLLED TYPE 2 DIABETES MELLITUS: ICD-10-CM

## 2024-09-18 DIAGNOSIS — I10 PRIMARY HYPERTENSION: ICD-10-CM

## 2024-09-18 DIAGNOSIS — N18.31 HYPERTENSIVE HEART AND KIDNEY DISEASE WITHOUT HEART FAILURE AND WITH STAGE 3A CHRONIC KIDNEY DISEASE: ICD-10-CM

## 2024-09-18 DIAGNOSIS — I13.10 HYPERTENSIVE HEART AND KIDNEY DISEASE WITHOUT HEART FAILURE AND WITH STAGE 3A CHRONIC KIDNEY DISEASE: ICD-10-CM

## 2024-09-18 NOTE — PROGRESS NOTES
Clinical Pharmacy Appointment    Patient ID: Tania Guerrero is a 84 y.o. female who presents for Diabetes and Chronic Kidney Disease.    Pt is here for First appointment.     Referring Provider: Sue Ontiveros MD  PCP: Sue Murray MD   Last visit with PCP: 8/9/2024   Next visit with PCP: 2/7/2025      Subjective     Interval History  Initiated Jardiance for CKD.     HPI  Type II Diabetes  Current  Pharmacotherapy:   None     SECONDARY PREVENTION  - Statin? Simvastatin 5 mg   LDL: 49   TC: 122  - ACE-I/ARB? Losartan 100 mg   UACR: 57.6 (7/2024)   BP: 134/83  - Aspirin? Yes    -The ASCVD Risk score ([place] ESPARZA, et al., 2019) failed to calculate for the following reasons:    The 2019 ASCVD risk score is only valid for ages 40 to 79    The patient has a prior MI or stroke diagnosis       Pertinent PMH Review:  - PMH of Urinary Tract Infections: No      CKD  eGFR: 63  UACR: 57.6    ACE/ARB: yes  SGLT2: no    Medication list reviewed: 9/11/2024 (date)    PULMONARY ASSESSMENT  Patient has been diagnosed with: COPD  does not see a pulmonologist    Current Regimen:  Trelegy 200-62.5-25 mcg daily  Alubterol HFA PRN    Appropriate Inhaler Technique? yes  How often do you use your rescue inhaler? N/A      Immunization History:  Influenza: Date [12/16/2022]  PCV13: Date [1/2017, 3/14/2016]  PPSV23: Date [10/2014]  PCV20: Date [--]  COVID: Date [--]  RSV: Date [---]    Smoking history:  She has a history of 45 pack years.      Drug Interactions  No relevant drug interactions were noted.        Objective   Allergies   Allergen Reactions    Levofloxacin Unknown    Nickel Unknown    Penicillins Unknown    Sulfa (Sulfonamide Antibiotics) Unknown     Social History     Social History Narrative    Not on file      Medication Review  Current Outpatient Medications   Medication Instructions    acetaminophen (TYLENOL 8 HOUR) 1,300 mg, oral, Daily, Do not crush, chew, or split.    albuterol (Ventolin HFA) 90  mcg/actuation inhaler 1 puff, inhalation, Every 4 hours PRN    amLODIPine (NORVASC) 2.5 mg, oral, Daily    aspirin 81 mg EC tablet 1 tablet, oral, Daily    cholecalciferol (Vitamin D-3) 50 mcg (2,000 unit) capsule 1 capsule, oral, 2 times daily    cinnamon (CINNAMON) 500 mg, oral, Daily    cyanocobalamin (VITAMIN B-12) 1,000 mcg, oral, Daily    empagliflozin (JARDIANCE) 10 mg, oral, Daily    fish oil concentrate (Omega-3) 120-180 mg capsule 1 capsule, oral, Daily    fluticasone-umeclidin-vilanter (TRELEGY-ELLIPTA) 200-62.5-25 mcg blister with device 1 puff, inhalation, Daily before breakfast    GARLIC PO 1 tablet, oral, Daily    TEO OIL ORAL 1 capsule, oral, Daily    latanoprost (Xalatan) 0.005 % ophthalmic solution 1 drop, Both Eyes, Nightly    levothyroxine (SYNTHROID, LEVOXYL) 50 mcg, oral, Daily    losartan-hydrochlorothiazide (Hyzaar) 100-25 mg tablet 1 tablet, oral, Daily    magnesium oxide (MAG-OX) 250 mg, oral, Daily    metoprolol succinate XL (TOPROL-XL) 100 mg, oral, Daily    multivitamin tablet 1 tablet, oral, Daily    potassium gluconate 595 mg (99 mg) tablet 1 tablet, oral, 2 times daily    simvastatin (ZOCOR) 5 mg, oral, Nightly    traMADol (ULTRAM) 50 mg, oral, Every 8 hours PRN    TURMERIC ORAL 1 tablet, oral, Daily    ubidecarenone (coenzyme Q10) 100 mg tablet 1 tablet, oral, Daily      Vitals  BP Readings from Last 2 Encounters:   08/09/24 134/83   04/22/24 105/65     BMI Readings from Last 1 Encounters:   08/09/24 32.12 kg/m²      Labs  A1C  Lab Results   Component Value Date    HGBA1C 6.8 (H) 07/30/2024    HGBA1C 6.6 (H) 12/11/2023    HGBA1C 6.5 (A) 06/02/2023     BMP  Lab Results   Component Value Date    CALCIUM 8.9 07/30/2024     07/30/2024    K 3.7 07/30/2024    CO2 27 07/30/2024     07/30/2024    BUN 19 07/30/2024    CREATININE 0.91 07/30/2024    EGFR 63 07/30/2024     LFTs  Lab Results   Component Value Date    ALT 35 07/30/2024    AST 16 07/30/2024    ALKPHOS 80 07/30/2024     BILITOT 0.4 07/30/2024     FLP  Lab Results   Component Value Date    TRIG 92 07/30/2024    CHOL 122 07/30/2024    LDLF 31 06/02/2023    LDLCALC 49 07/30/2024    HDL 54.2 07/30/2024     Urine Microalbumin  Lab Results   Component Value Date    MICROALBCREA 57.6 (H) 07/30/2024     Weight Management  Wt Readings from Last 3 Encounters:   08/09/24 79.7 kg (175 lb 9.6 oz)   04/22/24 77 kg (169 lb 12.1 oz)   02/02/24 79 kg (174 lb 3.2 oz)      There is no height or weight on file to calculate BMI.     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 We look into the studies that went into it's approval. She is agreeable to starting therapy.     Clinical Pharmacist follow-up: 10/9 @ 11 AM,  Telehealth visit    Continue all meds under the continuation of care with the referring provider and clinical pharmacy team.    Thank you,  Lizeth Lazo, PharmD  Clinical Pharmacy Specialist     Verbal consent to manage patient's drug therapy was obtained from the patient. They were informed they may decline to participate or withdraw from participation in pharmacy services at any time.

## 2024-09-20 PROCEDURE — RXMED WILLOW AMBULATORY MEDICATION CHARGE

## 2024-09-21 ENCOUNTER — PHARMACY VISIT (OUTPATIENT)
Dept: PHARMACY | Facility: CLINIC | Age: 84
End: 2024-09-21
Payer: MEDICARE

## 2024-10-02 ENCOUNTER — APPOINTMENT (OUTPATIENT)
Dept: PHARMACY | Facility: HOSPITAL | Age: 84
End: 2024-10-02
Payer: MEDICARE

## 2024-10-09 ENCOUNTER — APPOINTMENT (OUTPATIENT)
Dept: PHARMACY | Facility: HOSPITAL | Age: 84
End: 2024-10-09
Payer: MEDICARE

## 2024-10-09 DIAGNOSIS — N18.31 HYPERTENSIVE HEART AND KIDNEY DISEASE WITHOUT HEART FAILURE AND WITH STAGE 3A CHRONIC KIDNEY DISEASE: ICD-10-CM

## 2024-10-09 DIAGNOSIS — I10 PRIMARY HYPERTENSION: ICD-10-CM

## 2024-10-09 DIAGNOSIS — J44.9 CHRONIC OBSTRUCTIVE PULMONARY DISEASE, UNSPECIFIED COPD TYPE (MULTI): ICD-10-CM

## 2024-10-09 DIAGNOSIS — I13.10 HYPERTENSIVE HEART AND KIDNEY DISEASE WITHOUT HEART FAILURE AND WITH STAGE 3A CHRONIC KIDNEY DISEASE: ICD-10-CM

## 2024-10-09 DIAGNOSIS — E11.9 DIET-CONTROLLED TYPE 2 DIABETES MELLITUS: ICD-10-CM

## 2024-10-09 NOTE — ASSESSMENT & PLAN NOTE
Patient has been doing well since starting Jardiance. Has been on therapy for ~ 2 weeks and has had no side effects or concerned. We discuss that we will continue on current therapy and re-test labs 3 months and plan to follow up after her next PCP visit.     Plan:  CONTINUE Jardiance 10 mg daily  Rx to  [facility] for PAP

## 2024-10-09 NOTE — ASSESSMENT & PLAN NOTE
Patient has cost concerns for her Trelegy upon reaching the donut [facility]. We review her medication therapy today and she states that she has done well overall with the Trelegy. She verbally confirms that she should qualify for  PAP.     Patient is due for influenza, RSV, and PCV20 vaccinations this fall.     Plan:  CONTINUE Trelegy 200-62.5-25 mcg daily  Rx to  [facility] for PAP

## 2024-10-09 NOTE — PROGRESS NOTES
Clinical Pharmacy Appointment    Patient ID: Tania Guerrero is a 84 y.o. female who presents for Diabetes and Chronic Kidney Disease.    Pt is here for Follow Up    Referring Provider: Sue Ontiveros MD  PCP: Sue Murray MD   Last visit with PCP: 8/9/2024   Next visit with PCP: 2/7/2025      Subjective     Interval History  Initiated Jardiance for CKD.     HPI  Type II Diabetes  Current  Pharmacotherapy:   Jardiance 10 mg daily      SECONDARY PREVENTION  - Statin? Simvastatin 5 mg   LDL: 49   TC: 122  - ACE-I/ARB? Losartan 100 mg   UACR: 57.6 (7/2024)   BP: 134/83  - Aspirin? Yes    -The ASCVD Risk score ([place] ESPARZA, et al., 2019) failed to calculate for the following reasons:    The 2019 ASCVD risk score is only valid for ages 40 to 79    The patient has a prior MI or stroke diagnosis       Pertinent PMH Review:  - PMH of Urinary Tract Infections: No      CKD  eGFR: 63  UACR: 57.6    ACE/ARB: losartan 100 mg   SGLT2: Jardiance 10 mg     Medication list reviewed: 9/11/2024 (date)    PULMONARY ASSESSMENT  Patient has been diagnosed with: COPD  does not see a pulmonologist    Current Regimen:  Trelegy 200-62.5-25 mcg daily  Alubterol HFA PRN    Appropriate Inhaler Technique? yes  How often do you use your rescue inhaler? N/A      Immunization History:  Influenza: Date [12/16/2022]  PCV13: Date [1/2017, 3/14/2016]  PPSV23: Date [10/2014]  PCV20: Date [--]  COVID: Date [--]  RSV: Date [---]    Smoking history:  She has a history of 45 pack years.      Drug Interactions  No relevant drug interactions were noted.        Objective   Allergies   Allergen Reactions    Levofloxacin Unknown    Nickel Unknown    Penicillins Unknown    Sulfa (Sulfonamide Antibiotics) Unknown     Social History     Social History Narrative    Not on file      Medication Review  Current Outpatient Medications   Medication Instructions    acetaminophen (TYLENOL 8 HOUR) 1,300 mg, oral, Daily, Do not crush, chew, or split.     albuterol (Ventolin HFA) 90 mcg/actuation inhaler 1 puff, inhalation, Every 4 hours PRN    amLODIPine (NORVASC) 2.5 mg, oral, Daily    aspirin 81 mg EC tablet 1 tablet, oral, Daily    cholecalciferol (Vitamin D-3) 50 mcg (2,000 unit) capsule 1 capsule, oral, 2 times daily    cinnamon (CINNAMON) 500 mg, oral, Daily    cyanocobalamin (VITAMIN B-12) 1,000 mcg, oral, Daily    empagliflozin (JARDIANCE) 10 mg, oral, Daily    fish oil concentrate (Omega-3) 120-180 mg capsule 1 capsule, oral, Daily    fluticasone-umeclidin-vilanter (TRELEGY-ELLIPTA) 200-62.5-25 mcg blister with device 1 puff, inhalation, Daily before breakfast    GARLIC PO 1 tablet, oral, Daily    TEO OIL ORAL 1 capsule, oral, Daily    latanoprost (Xalatan) 0.005 % ophthalmic solution 1 drop, Both Eyes, Nightly    levothyroxine (SYNTHROID, LEVOXYL) 50 mcg, oral, Daily    losartan-hydrochlorothiazide (Hyzaar) 100-25 mg tablet 1 tablet, oral, Daily    magnesium oxide (MAG-OX) 250 mg, oral, Daily    metoprolol succinate XL (TOPROL-XL) 100 mg, oral, Daily    multivitamin tablet 1 tablet, oral, Daily    potassium gluconate 595 mg (99 mg) tablet 1 tablet, oral, 2 times daily    simvastatin (ZOCOR) 5 mg, oral, Nightly    traMADol (ULTRAM) 50 mg, oral, Every 8 hours PRN    TURMERIC ORAL 1 tablet, oral, Daily    ubidecarenone (coenzyme Q10) 100 mg tablet 1 tablet, oral, Daily      Vitals  BP Readings from Last 2 Encounters:   08/09/24 134/83   04/22/24 105/65     BMI Readings from Last 1 Encounters:   08/09/24 32.12 kg/m²      Labs  A1C  Lab Results   Component Value Date    HGBA1C 6.8 (H) 07/30/2024    HGBA1C 6.6 (H) 12/11/2023    HGBA1C 6.5 (A) 06/02/2023     BMP  Lab Results   Component Value Date    CALCIUM 8.9 07/30/2024     07/30/2024    K 3.7 07/30/2024    CO2 27 07/30/2024     07/30/2024    BUN 19 07/30/2024    CREATININE 0.91 07/30/2024    EGFR 63 07/30/2024     LFTs  Lab Results   Component Value Date    ALT 35 07/30/2024    AST 16  07/30/2024    ALKPHOS 80 07/30/2024    BILITOT 0.4 07/30/2024     FLP  Lab Results   Component Value Date    TRIG 92 07/30/2024    CHOL 122 07/30/2024    LDLF 31 06/02/2023    LDLCALC 49 07/30/2024    HDL 54.2 07/30/2024     Urine Microalbumin  Lab Results   Component Value Date    MICROALBCREA 57.6 (H) 07/30/2024     Weight Management  Wt Readings from Last 3 Encounters:   08/09/24 79.7 kg (175 lb 9.6 oz)   04/22/24 77 kg (169 lb 12.1 oz)   02/02/24 79 kg (174 lb 3.2 oz)      There is no height or weight on file to calculate BMI.     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 Has been on therapy for ~ 2 weeks and has had no side effects or concerned. We discuss that we will continue on current therapy and re-test labs 3 months and plan to follow up after her next PCP visit.     Plan:  CONTINUE Jardiance 10 mg daily  Rx to [facility] for PAP         Relevant Medications    empagliflozin (Jardiance) 10 mg    Other Relevant Orders    Referral to Clinical Pharmacy    Diet-controlled type 2 diabetes mellitus     Patient's diabetes is currently well-controlled (A1c of 6.8%).    Patient has done well on therapy so far. We are going to continue therapy and monitor A1c.     Plan:  CONTINUE Jardiance 10 mg daily  CONTINUE diet and lifestyle management.         Relevant Orders    Referral to Clinical Pharmacy    Hypertension    Relevant Orders    Referral to Clinical Pharmacy    COPD (chronic obstructive pulmonary disease) (Multi)     Patient has cost concerns for her Trelegy upon reaching the donut hole. We review her medication therapy today and she states that she has done well overall with the Trelegy. She verbally confirms that she should qualify for  PAP.     Patient is due for influenza, RSV, and PCV20 vaccinations this fall.      Plan:  CONTINUE Trelegy 200-62.5-25 mcg daily  Rx to [facility] for PAP         Relevant Medications    fluticasone-umeclidin-vilanter (TRELEGY-ELLIPTA) 200-62.5-25 mcg blister with device    Other Relevant Orders    Referral to Clinical Pharmacy         Clinical Pharmacist follow-up: 3 months after PCP visit,  Telehealth visit    Continue all meds under the continuation of care with the referring provider and clinical pharmacy team.    Thank you,  Lizeth Lazo, PharmD  Clinical Pharmacy Specialist     Verbal consent to manage patient's drug therapy was obtained from the patient. They were informed they may decline to participate or withdraw from participation in pharmacy services at any time.

## 2024-10-09 NOTE — ASSESSMENT & PLAN NOTE
Patient's diabetes is currently well-controlled (A1c of 6.8%).    Patient has done well on therapy so far. We are going to continue therapy and monitor A1c.     Plan:  CONTINUE Jardiance 10 mg daily  CONTINUE diet and lifestyle management.

## 2024-10-17 PROCEDURE — RXMED WILLOW AMBULATORY MEDICATION CHARGE

## 2024-10-18 ENCOUNTER — PHARMACY VISIT (OUTPATIENT)
Dept: PHARMACY | Facility: CLINIC | Age: 84
End: 2024-10-18
Payer: MEDICARE

## 2024-10-23 DIAGNOSIS — I10 PRIMARY HYPERTENSION: ICD-10-CM

## 2024-10-24 RX ORDER — AMLODIPINE BESYLATE 2.5 MG/1
2.5 TABLET ORAL DAILY
Qty: 90 TABLET | Refills: 3 | Status: SHIPPED | OUTPATIENT
Start: 2024-10-24

## 2024-11-17 NOTE — PROGRESS NOTES
[facility]    Patient Name: Tania Guerrero  Patient : 1940      Scribe Attestation  By signing my name below, I, Christine Uirbe   attest that this documentation has been prepared under the direction and in the presence of Rebel Nugent DO.      Reason for visit:  This is an 84-year-old female here for follow-up regarding mild coronary artery disease as seen on cardiac catheterization done in , hypertension, dyslipidemia, COPD, lower extremity edema, and obesity.       HPI:  This is an 84-year-old female here for follow-up regarding mild coronary artery disease as seen on cardiac catheterization done in , hypertension, dyslipidemia, COPD, lower extremity edema, and obesity.  The patient was last evaluated by me in 2023.  At that visit I ordered blood work including CMP/lipid/magnesium/CBC to be drawn in 6 months and asked the patient to follow-up in 1 year.  Patient subsequently had a hospitalization in 2024 at which time she underwent a hernia repair with general surgery.  CMP done in 2024 showed normal serum sodium and potassium with a serum creatinine of 0.91, normal ALT/AST, hemoglobin A1c was 6.8%, TSH was 2.12, CBC showed a hemoglobin of 14.0.  Lipid panel done in 2024 showed an LDL cholesterol 49 and triglycerides of 92 while on simvastatin 10 mg daily. ECG done today showed sinus rhythm with a heart rate of 60 bpm. The patient reports that she has been feeling generally well from the cardiac standpoint. She denies any new chest pain, shortness of breath, palpitations and lightheadedness. She states that she has mild lower extremity edema which does not bother her. She states that she takes all of her medications as prescribed. During my exam, she was resting comfortably on the exam table.            Assessment/Plan:   1. Coronary artery disease  The patient has a history of history of mild coronary artery disease  as seen on cardiac catheterization done in 2005.  ECG done today showed sinus rhythm with a heart rate of 60 bpm.    She denies anginal chest discomfort.  Blood pressure appears controlled on exam today.   She should continue her current antihypertensive medications.  Echocardiogram done in May 2022 showed low normal left ventricular systolic function with an ejection fraction of 50 to 55%, low normal right ventricular systolic function, no significant valve abnormalities.   Recent lab works as noted in the HPI.   Lipid panel done in July 2024 showed an LDL cholesterol 49 and triglycerides of 92 while on simvastatin 10 mg daily.   Please see lifestyle recommendations below.  Follow up in 1 year and sooner if necessary.      2. Hypertension  The patient has a history of hypertension and blood pressure appears controlled on exam today.   She should continue her current antihypertensive medications and monitor her blood pressure at home.      3. Dyslipidemia  Lipid panel done in July 2024 showed an LDL cholesterol 49 and triglycerides of 92 while on simvastatin 10 mg daily.   Please see lifestyle recommendations below.     4. COPD  Stable. Management as per PCP.     5. Lower extremity edema  The patient has trace to 1+ pitting bilateral lower extremity edema.  I discussed with her the importance of following a low-sodium heart healthy diet as well as weight loss, wearing compression stockings and elevating legs when seated.      6. Obesity  Please see lifestyle recommendations below.         Order:   Follow-up in 1 year.    Lifestyle Recommendations  I recommend a whole-food plant-based diet, an eating pattern that encourages the consumption of unrefined plant foods (such as fruits, vegetables, tubers, whole grains, legumes, nuts and seeds) and discourages meats, dairy products, eggs and processed foods.     The AHA/ACC recommends that the patient consume a dietary pattern that emphasizes intake of vegetables, fruits,  and whole grains; includes low-fat dairy products, poultry, fish, legumes, non-tropical vegetable oils, and nuts; and limits intake of sodium, sweets, sugar-sweetened beverages, and red meats.  Adapt this dietary pattern to appropriate calorie requirements (a 500-750 kcal/day deficit to loose weight), personal and cultural food preferences, and nutrition therapy for other medical conditions (including diabetes).  Achieve this pattern by following plans such as the [place] Mediterranean, DASH dietary pattern, or AHA diet.     Engage in 2 hours and 30 minutes per week of moderate-intensity physical activity, or 1 hour and 15 minutes (75 minutes) per week of vigorous-intensity aerobic physical activity, or an equivalent combination of moderate and vigorous-intensity aerobic physical activity. Aerobic activity should be performed in episodes of at least 10 minutes preferably spread throughout the week.     Adhering to a heart healthy diet, regular exercise habits, avoidance of tobacco products, and maintenance of a healthy weight are crucial components of their heart disease risk reduction.     Any positive review of systems not specifically addressed in the office visit today should be evaluated and treated by the patients primary care physician or in an emergency department if necessary     Patient was notified that results from ordered tests will be called to the patient if it changes current management; it will otherwise be discussed at a future appointment and available on  [employer].     Thank you for allowing me to participate in the care of this patient.        This document was generated using the assistance of voice recognition software. If there are any errors of spelling, grammar, syntax, or meaning; please feel free to contact me directly for clarification.    Past Medical History:  She has a past medical history of Disorder of kidney and ureter, unspecified (06/03/2021), Diverticulitis of intestine, part  unspecified, without perforation or abscess without bleeding, Hydronephrosis, left (06/08/2023), Medullary cystic kidney, Old myocardial infarction, Other conditions influencing health status, Personal history of other diseases of the respiratory system (06/24/2019), Personal history of other infectious and parasitic diseases, Personal history of other specified conditions (06/11/2018), Personal history of urinary calculi, and Unspecified hydronephrosis (05/20/2021).    Past Surgical History:  She has a past surgical history that includes Lithotripsy (05/16/2016); Other surgical history (06/24/2019); Other surgical history (12/02/2019); Other surgical history (12/02/2019); and Other surgical history (12/02/2019).      Social History:  She reports that she quit smoking about 24 years ago. Her smoking use included cigarettes. She started smoking about 69 years ago. She has a 45 pack-year smoking history. She has never been exposed to tobacco smoke. She has never used smokeless tobacco. She reports that she does not currently use alcohol. She reports that she does not use drugs.    Family History:  Family History   Problem Relation Name Age of Onset    No Known Problems Father          Allergies:  Levofloxacin, Nickel, Penicillins, and Sulfa (sulfonamide antibiotics)    Outpatient Medications:  Current Outpatient Medications   Medication Instructions    acetaminophen (TYLENOL 8 HOUR) 1,300 mg, oral, Daily, Do not crush, chew, or split.    albuterol (Ventolin HFA) 90 mcg/actuation inhaler 1 puff, inhalation, Every 4 hours PRN    amLODIPine (NORVASC) 2.5 mg, oral, Daily    aspirin 81 mg EC tablet 1 tablet, oral, Daily    cholecalciferol (Vitamin D-3) 50 mcg (2,000 unit) capsule 1 capsule, oral, 2 times daily    cinnamon (CINNAMON) 500 mg, oral, Daily    cyanocobalamin (VITAMIN B-12) 1,000 mcg, oral, Daily    fish oil concentrate (Omega-3) 120-180 mg capsule 1 capsule, oral, Daily    fluticasone-umeclidin-vilanter  "(TRELEGY-ELLIPTA) 200-62.5-25 mcg blister with device 1 puff, inhalation, Daily before breakfast    GARLIC PO 1 tablet, oral, Daily    TEO OIL ORAL 1 capsule, oral, Daily    Jardiance 10 mg, oral, Daily    latanoprost (Xalatan) 0.005 % ophthalmic solution 1 drop, Both Eyes, Nightly    levothyroxine (SYNTHROID, LEVOXYL) 50 mcg, oral, Daily    losartan-hydrochlorothiazide (Hyzaar) 100-25 mg tablet 1 tablet, oral, Daily    magnesium oxide (MAG-OX) 250 mg, oral, Daily    metoprolol succinate XL (TOPROL-XL) 100 mg, oral, Daily    multivitamin tablet 1 tablet, oral, Daily    potassium gluconate 595 mg (99 mg) tablet 1 tablet, oral, 2 times daily    simvastatin (ZOCOR) 5 mg, oral, Nightly    traMADol (ULTRAM) 50 mg, oral, Every 8 hours PRN    TURMERIC ORAL 1 tablet, oral, Daily    ubidecarenone (coenzyme Q10) 100 mg tablet 1 tablet, oral, Daily        ROS:  A 14 point review of systems was done and is negative other than as stated in HPI    Vitals:      4/21/2024     8:08 PM 4/21/2024    11:41 PM 4/22/2024     4:23 AM 4/22/2024     7:40 AM 4/22/2024     8:58 AM 4/22/2024    11:15 AM 8/9/2024    11:40 AM   Vitals   Systolic 102 113 118 111  105 134   Diastolic 73 67 66 71  65 83   Heart Rate 60 56 54 53 64 62 64   Temp 36.3 °C (97.4 °F) 36.1 °C (97 °F) 36.6 °C (97.9 °F) 36.4 °C (97.5 °F)  36.4 °C (97.6 °F) 36.3 °C (97.3 °F)   Resp 17 17 17 18  18    Height (in)       1.575 m (5' 2\")   Weight (lb)   169.75    175.6   BMI   31.05 kg/m2    32.12 kg/m2   BSA (m2)   1.84 m2    1.87 m2        Physical Exam:   Constitutional: Cooperative, in no acute distress, alert, appears stated age.   Skin: Skin color, texture, turgor normal. No rashes or lesions.   Head: Normocephalic. No masses, lesions, tenderness or abnormalities   Eyes: Extraocular movements are grossly intact.   Mouth and throat: Mucous membranes moist   Neck: Neck supple, no carotid bruits, no JVD   Respiratory: Lungs clear to auscultation, no wheezing or rhonchi, no " use of accessory muscles   Chest wall: No scars, normal excursion with respiration   Cardiovascular: Regular rhythm without murmur, gallop, or rubs   Gastrointestinal: Abdomen soft, nontender. Bowel sounds normal. Obese.  Musculoskeletal: Strength equal in upper extremities   Extremities: Trace to 1+ pitting edema   Neurologic: Sensation grossly intact, alert and oriented x3.        Intake/Output:   No intake/output data recorded.    Outpatient Medications  Current Outpatient Medications on File Prior to Visit   Medication Sig Dispense Refill    acetaminophen (Tylenol 8 HOUR) 650 mg ER tablet Take 2 tablets (1,300 mg) by mouth once daily. Do not crush, chew, or split.      albuterol (Ventolin HFA) 90 mcg/actuation inhaler Inhale 1 puff every 4 hours if needed for wheezing or shortness of breath.      amLODIPine (Norvasc) 2.5 mg tablet TAKE 1 TABLET ONCE DAILY 90 tablet 3    aspirin 81 mg EC tablet Take 1 tablet (81 mg) by mouth once daily.      cholecalciferol (Vitamin D-3) 50 mcg (2,000 unit) capsule Take 1 capsule (50 mcg) by mouth 2 times a day.      Cinnamon 500 mg capsule Take 1 capsule (500 mg) by mouth once daily.      cyanocobalamin (Vitamin B-12) 1,000 mcg tablet Take 1 tablet (1,000 mcg) by mouth once daily.      empagliflozin (Jardiance) 10 mg Take 1 tablet (10 mg) by mouth once daily. 90 tablet 1    fish oil concentrate (Omega-3) 120-180 mg capsule Take 1 capsule (1 g) by mouth once daily.      fluticasone-umeclidin-vilanter (TRELEGY-ELLIPTA) 200-62.5-25 mcg blister with device Inhale 1 puff once daily in the morning. Take before meals. 180 each 3    GARLIC PO Take 1 tablet by mouth once daily.      TEO OIL ORAL Take 1 capsule by mouth once daily.      latanoprost (Xalatan) 0.005 % ophthalmic solution Administer 1 drop into both eyes once daily at bedtime.      levothyroxine (Synthroid, Levoxyl) 50 mcg tablet Take 1 tablet (50 mcg) by mouth once daily. 90 tablet 3    losartan-hydrochlorothiazide  (Hyzaar) 100-25 mg tablet Take 1 tablet by mouth once daily. 90 tablet 1    magnesium oxide (Mag-Ox) 250 mg magnesium tablet Take 1 tablet (250 mg) by mouth once daily.      metoprolol succinate XL (Toprol-XL) 100 mg 24 hr tablet Take 1 tablet (100 mg) by mouth once daily. 90 tablet 3    multivitamin tablet Take 1 tablet by mouth once daily.      potassium gluconate 595 mg (99 mg) tablet Take 1 tablet by mouth 2 times a day.      simvastatin (Zocor) 10 mg tablet Take 0.5 tablets (5 mg) by mouth once daily at bedtime. 45 tablet 3    traMADol (Ultram) 50 mg tablet Take 1 tablet (50 mg) by mouth every 8 hours if needed for severe pain (7 - 10). 6 tablet 0    TURMERIC ORAL Take 1 tablet by mouth once daily.      ubidecarenone (coenzyme Q10) 100 mg tablet Take 1 tablet by mouth once daily.       No current facility-administered medications on file prior to visit.       Labs: (past 26 weeks)  Recent Results (from the past 26 weeks)   Comprehensive Metabolic Panel    Collection Time: 07/30/24  8:01 AM   Result Value Ref Range    Glucose 104 (H) 74 - 99 mg/dL    Sodium 139 136 - 145 mmol/L    Potassium 3.7 3.5 - 5.3 mmol/L    Chloride 102 98 - 107 mmol/L    Bicarbonate 27 21 - 32 mmol/L    Anion Gap 14 10 - 20 mmol/L    Urea Nitrogen 19 6 - 23 mg/dL    Creatinine 0.91 0.50 - 1.05 mg/dL    eGFR 63 >60 mL/min/1.73m*2    Calcium 8.9 8.6 - 10.3 mg/dL    Albumin 3.8 3.4 - 5.0 g/dL    Alkaline Phosphatase 80 33 - 136 U/L    Total Protein 6.3 (L) 6.4 - 8.2 g/dL    AST 16 9 - 39 U/L    Bilirubin, Total 0.4 0.0 - 1.2 mg/dL    ALT 35 7 - 45 U/L   Lipid Panel    Collection Time: 07/30/24  8:01 AM   Result Value Ref Range    Cholesterol 122 0 - 199 mg/dL    HDL-Cholesterol 54.2 mg/dL    Cholesterol/HDL Ratio 2.3     LDL Calculated 49 <=99 mg/dL    VLDL 18 0 - 40 mg/dL    Triglycerides 92 0 - 149 mg/dL    Non HDL Cholesterol 68 0 - 149 mg/dL   Magnesium    Collection Time: 07/30/24  8:01 AM   Result Value Ref Range    Magnesium 1.86  1.60 - 2.40 mg/dL   CBC    Collection Time: 07/30/24  8:01 AM   Result Value Ref Range    WBC 7.9 4.4 - 11.3 x10*3/uL    nRBC 0.0 0.0 - 0.0 /100 WBCs    RBC 4.52 4.00 - 5.20 x10*6/uL    Hemoglobin 14.0 12.0 - 16.0 g/dL    Hematocrit 42.3 36.0 - 46.0 %    MCV 94 80 - 100 fL    MCH 31.0 26.0 - 34.0 pg    MCHC 33.1 32.0 - 36.0 g/dL    RDW 13.9 11.5 - 14.5 %    Platelets 303 150 - 450 x10*3/uL   TSH with reflex to Free T4 if abnormal    Collection Time: 07/30/24  8:01 AM   Result Value Ref Range    Thyroid Stimulating Hormone 2.12 0.44 - 3.98 mIU/L   Cholesterol, LDL Direct    Collection Time: 07/30/24  8:01 AM   Result Value Ref Range    LDL, Direct 59 0 - 129 mg/dL   Hemoglobin A1C    Collection Time: 07/30/24  8:01 AM   Result Value Ref Range    Hemoglobin A1C 6.8 (H) see below %    Estimated Average Glucose 148 Not Established mg/dL   Parathyroid Hormone, Intact    Collection Time: 07/30/24  8:01 AM   Result Value Ref Range    Parathyroid Hormone, Intact 75.6 18.5 - 88.0 pg/mL   Vitamin D 25-Hydroxy,Total (for eval of Vitamin D levels)    Collection Time: 07/30/24  8:01 AM   Result Value Ref Range    Vitamin D, 25-Hydroxy, Total 42 30 - 100 ng/mL   Vitamin B12    Collection Time: 07/30/24  8:01 AM   Result Value Ref Range    Vitamin B12 195 (L) 211 - 911 pg/mL   Albumin , Urine Random    Collection Time: 07/30/24  8:05 AM   Result Value Ref Range    Albumin, Urine Random 36.1 Not established mg/L    Creatinine, Urine Random 62.7 20.0 - 320.0 mg/dL    Albumin/Creatinine Ratio 57.6 (H) <30.0 ug/mg Creat       ECG  No results found for this or any previous visit (from the past 4464 hours).    Echocardiogram  No results found for this or any previous visit from the past 1095 days.      CV Studies:  EKG:No results found for this or any previous visit (from the past 4464 hours).  Echocardiogram:   Echocardiogram     [address] [address]  Phone [phone] Fax  [phone]    TRANSTHORACIC ECHOCARDIOGRAM REPORT      Patient Name:     VERNA MUSTAFA Reading Physician:   [id number] Rebel Nugent DO  Study Date:       5/17/2022         Referring Physician: [id number] ELISE  MRN/PID:          [id number]          PCP:  Accession/Order#: [id number]      Department Location: [facility]  YOB: 1940         Fellow:  Gender:           F                 Nurse:  Admit Date:                         Sonographer:         Arianna [facility]  Admission Status: Outpatient        Additional Staff:  Height:           157.48 cm         CC Report to:  Weight:           78.93 kg          Study Type:          Echocardiogram  BSA:              1.80 m2  Blood Pressure: 140 /74 mmHg    Diagnosis/ICD: I25.10-Atherosclerotic heart disease of native coronary artery  without angina pectoris  Indication:    ASHD  Procedure/CPT: Echo Complete w Full [id number]    Patient History:  Pertinent History: CAD, HTN and Hyperlipidemia.    Study Detail: The following Echo studies were performed: 2D, M-Mode, Doppler and  color flow.      PHYSICIAN INTERPRETATION:  Left Ventricle: The left ventricular systolic function is low normal, with an estimated ejection fraction of 50-55%. There are no regional wall motion abnormalities. The left ventricular cavity size is normal. Spectral Doppler shows an impaired relaxation pattern of left ventricular diastolic filling.  Left Atrium: The left atrium is normal in size.  Right Ventricle: The right ventricle is normal in size. There is low normal right ventricular systolic function.  Right Atrium: The right atrium is normal in size.  Aortic Valve: The aortic valve is trileaflet. There is trivial aortic valve regurgitation.  Mitral Valve: The mitral valve is normal in structure. There is trace mitral valve regurgitation.  Tricuspid Valve: The tricuspid valve is structurally normal. There is trace tricuspid regurgitation.  Pulmonic Valve: The  pulmonic valve is structurally normal. There is physiologic pulmonic valve regurgitation.  Pericardium: There is no pericardial effusion noted.  Aorta: The aortic root is normal.      CONCLUSIONS:  1. The left ventricular systolic function is low normal with a 50-55% estimated ejection fraction.  2. Spectral Doppler shows an impaired relaxation pattern of left ventricular diastolic filling.    QUANTITATIVE DATA SUMMARY:  2D MEASUREMENTS:  Normal Ranges:  Ao Root d:     3.50 cm   (2.0-3.7cm)  IVSd:          1.19 cm   (0.6-1.1cm)  LVPWd:         0.68 cm   (0.6-1.1cm)  LVIDd:         4.06 cm   (3.9-5.9cm)  LVIDs:         2.78 cm  LV Mass Index: 65.5 g/m2  LV % FS        31.5 %    LA VOLUME:  Normal Ranges:  LA Vol A4C:        35.9 ml    (22+/-6mL/m2)  LA Vol A2C:        25.7 ml  LA Vol BP:         30.4 ml  LA Vol Index A4C:  19.9ml/m2  LA Vol Index A2C:  14.3 ml/m2  LA Vol Index BP:   16.9 ml/m2  LA Area A4C:       13.0 cm2  LA Area A2C:       11.0 cm2  LA Major Axis A4C: 4.0 cm  LA Major Axis A2C: 4.0 cm  LA Volume Index:   17.0 ml/m2    RA VOLUME BY A/L METHOD:  Normal Ranges:  RA Area A4C: 9.3 cm2    M-MODE MEASUREMENTS:  Normal Ranges:  Ao Root: 2.30 cm (2.0-3.7cm)  AoV Exc: 2.00 cm (1.5-2.5cm)  LAs:     4.00 cm (2.7-4.0cm)    AORTA MEASUREMENTS:  Normal Ranges:  AoV Exc:   2.00 cm (1.5-2.5cm)  Asc Ao, d: 3.15 cm (2.1-3.4cm)    LV SYSTOLIC FUNCTION BY 2D PLANIMETRY (MOD):  Normal Ranges:  EF-A4C View: 52.4 % (>55%)  EF-A2C View: 48.6 %  EF-Biplane:  49.5 %    LV DIASTOLIC FUNCTION:  Normal Ranges:  MV Peak E:    0.49 m/s (0.7-1.2 m/s)  MV Peak A:    0.61 m/s (0.42-0.7 m/s)  E/A Ratio:    0.80     (1.0-2.2)  MV e'         0.06 m/s (>8.0)  MV lateral e' 0.05 m/s  MV medial e'  0.06 m/s  E/e' Ratio:   8.87     (<8.0)  LV IVRT:      109 msec (<100ms)    AORTIC VALVE:  Normal Ranges:  LVOT Max Stiven:  0.99 m/s (<1.1m/s)  LVOT VTI:      24.50 cm  LVOT Diameter: 1.90 cm  (1.8-2.4cm)    RIGHT VENTRICLE:  RV 1   2.6 cm  RV 2    1.9 cm  RV 3   6.0 cm  TAPSE: 24.5 mm  RV s'  0.15 m/s    TRICUSPID VALVE/RVSP:  Normal Ranges:  Peak TR Velocity: 2.28 m/s  RV Syst Pressure: 23.8 mmHg (< 30mmHg)  TV E Vmax:        0.39 m/s  (0.3-0.7m/s)  TV A Vmax:        0.35 m/s  IVC Diam:         0.98 cm      [id number] Rebel Nugent DO  Electronically signed on 5/17/2022 at 12:10:42 PM         Final     Stress Testing IMGRESULT([id number]:1:1825): No results found for this or any previous visit from the past 1825 days.    Cardiac Catheterization: No results found for this or any previous visit from the past 1825 days.  No results found for this or any previous visit from the past 3650 days.     Cardiac Scoring: No results found for this or any previous visit from the past 1825 days.    AAA : No results found for this or any previous visit from the past 1825 days.    OTHER: No results found for this or any previous visit from the past 1825 days.    LAST IMAGING RESULTS  CT abdomen pelvis w IV contrast  Narrative: Interpreted By:  Brian Broussard,   STUDY:  CT ABDOMEN PELVIS W IV CONTRAST;  4/21/2024 12:37 pm      INDICATION:  Signs/Symptoms:rlq pain, tender.      COMPARISON:  06/06/2023      ACCESSION NUMBER(S):  [id number]      ORDERING CLINICIAN:  SAMUEL JIN      TECHNIQUE:  Contiguous axial images were obtained through the abdomen and pelvis  following the intravenous administration of 75 cc Omnipaque 350.  Coronal and sagittal reconstructions were performed.      FINDINGS:  LOWER CHEST:  Images through the lung bases  demonstrate mild areas of atelectasis  and/or scarring. Small hiatal hernia.      ABDOMEN AND PELVIS:      LIVER:  Stable cysts in the left hepatic lobe.      BILE DUCTS:  Not abnormally dilated.      GALLBLADDER:  No calcified stones. No wall thickening.      PANCREAS:  Appears unremarkable.      SPLEEN:  Appears unremarkable.      ADRENAL GLANDS:  Stable prominence of the left adrenal gland. Right adrenal gland  remains unremarkable.      KIDNEYS,  URETERS, AND BLADDER:  The kidneys enhance with contrast material symmetrically. There is no  evidence of hydronephrosis. Stable cysts in the kidneys, measuring up  to 6.1 cm on the left. Nonobstructing calculi in the inferior pole of  the left kidney measuring up to 12 mm in size. 1 mm nonobstructing  calculus within the midpole of the right kidney. There is urinary  bladder prolapse, similar to the prior study. Urinary bladder is  otherwise unremarkable and unchanged.      BOWEL:  Colonic diverticulosis. No definite evidence of diverticulitis. There  is mild prominence of the walls of the colon most likely related to  incomplete distention. Colitis felt less likely. There is  proliferation of the submucosal fat of a portion of the  distal/terminal ileum, as well as the ascending colon, nonspecific,  but can be associated with chronic inflammation. There is no evidence  of a bowel obstruction.  Appendix is not identified. Although CT has  limited sensitivity and specificity for gastric pathology, the  stomach appears grossly unremarkable.      RETROPERITONEUM, VESSELS:  There is no aneurysmal dilatation of the abdominal aorta. The IVC is  within normal limits.  There are atherosclerotic calcifications of  the aorta and its branches. No pathologically enlarged  retroperitoneal lymph nodes are noted.      PERITONEUM:  There is no evidence of pneumoperitoneum.  No ascites or loculated  fluid collection noted.  No pathologically enlarged mesenteric lymph  nodes are identified.      ABDOMINAL WALL, SOFT TISSUES:  There is a fat containing anterior abdominal wall hernia in the right  lower quadrant, likely a spigelian hernia. The herniated fat is  inflamed, suggesting strangulation. Small fat containing left  inguinal hernia noted and unchanged.      SKELETON:  Osteopenia. Degenerate changes. No acute process.      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 Prominence of the walls of the colon most  likely relates to incomplete distention. Colitis felt less likely.  Nonobstructive nephrolithiasis.      MACRO:  None.      Signed by: Brian Broussard 4/21/2024 1:02 PM  Dictation workstation:   [id number]      Problem List Items Addressed This Visit       ASHD (arteriosclerotic heart disease) - Primary    Bilateral lower extremity edema    Hyperlipidemia    Hypertension    Obesity (BMI 30-39.9)    COPD (chronic obstructive pulmonary disease) (Multi)          Rebel Nugent DO, FACC, FACOI

## 2024-11-20 ENCOUNTER — APPOINTMENT (OUTPATIENT)
Dept: CARDIOLOGY | Facility: CLINIC | Age: 84
End: 2024-11-20
Payer: MEDICARE

## 2024-11-20 VITALS
DIASTOLIC BLOOD PRESSURE: 76 MMHG | SYSTOLIC BLOOD PRESSURE: 126 MMHG | HEART RATE: 60 BPM | HEIGHT: 62 IN | BODY MASS INDEX: 32.31 KG/M2 | WEIGHT: 175.6 LBS

## 2024-11-20 DIAGNOSIS — E78.00 PURE HYPERCHOLESTEROLEMIA: ICD-10-CM

## 2024-11-20 DIAGNOSIS — E66.9 OBESITY (BMI 30-39.9): ICD-10-CM

## 2024-11-20 DIAGNOSIS — J44.9 CHRONIC OBSTRUCTIVE PULMONARY DISEASE, UNSPECIFIED COPD TYPE (MULTI): ICD-10-CM

## 2024-11-20 DIAGNOSIS — R60.0 BILATERAL LOWER EXTREMITY EDEMA: ICD-10-CM

## 2024-11-20 DIAGNOSIS — E78.2 MIXED HYPERLIPIDEMIA: ICD-10-CM

## 2024-11-20 DIAGNOSIS — I10 PRIMARY HYPERTENSION: ICD-10-CM

## 2024-11-20 DIAGNOSIS — I25.10 ASHD (ARTERIOSCLEROTIC HEART DISEASE): Primary | ICD-10-CM

## 2024-11-20 PROCEDURE — 1036F TOBACCO NON-USER: CPT | Performed by: INTERNAL MEDICINE

## 2024-11-20 PROCEDURE — 1159F MED LIST DOCD IN RCRD: CPT | Performed by: INTERNAL MEDICINE

## 2024-11-20 PROCEDURE — 3078F DIAST BP <80 MM HG: CPT | Performed by: INTERNAL MEDICINE

## 2024-11-20 PROCEDURE — 99214 OFFICE O/P EST MOD 30 MIN: CPT | Performed by: INTERNAL MEDICINE

## 2024-11-20 PROCEDURE — 93010 ELECTROCARDIOGRAM REPORT: CPT | Performed by: INTERNAL MEDICINE

## 2024-11-20 PROCEDURE — 1123F ACP DISCUSS/DSCN MKR DOCD: CPT | Performed by: INTERNAL MEDICINE

## 2024-11-20 PROCEDURE — 3074F SYST BP LT 130 MM HG: CPT | Performed by: INTERNAL MEDICINE

## 2024-11-20 PROCEDURE — 93005 ELECTROCARDIOGRAM TRACING: CPT | Performed by: INTERNAL MEDICINE

## 2024-11-20 RX ORDER — METOPROLOL SUCCINATE 100 MG/1
100 TABLET, EXTENDED RELEASE ORAL DAILY
Qty: 90 TABLET | Refills: 3 | Status: SHIPPED | OUTPATIENT
Start: 2024-11-20

## 2024-11-20 RX ORDER — LOSARTAN POTASSIUM AND HYDROCHLOROTHIAZIDE 25; 100 MG/1; MG/1
1 TABLET ORAL DAILY
Qty: 90 TABLET | Refills: 1 | Status: SHIPPED | OUTPATIENT
Start: 2024-11-20

## 2024-11-29 ENCOUNTER — APPOINTMENT (OUTPATIENT)
Dept: CARDIOLOGY | Facility: CLINIC | Age: 84
End: 2024-11-29
Payer: MEDICARE

## 2024-12-16 DIAGNOSIS — I10 PRIMARY HYPERTENSION: ICD-10-CM

## 2024-12-16 DIAGNOSIS — J44.9 CHRONIC OBSTRUCTIVE PULMONARY DISEASE, UNSPECIFIED COPD TYPE (MULTI): ICD-10-CM

## 2024-12-16 DIAGNOSIS — E66.9 OBESITY (BMI 30-39.9): ICD-10-CM

## 2024-12-16 DIAGNOSIS — I25.10 ASHD (ARTERIOSCLEROTIC HEART DISEASE): ICD-10-CM

## 2024-12-16 DIAGNOSIS — E78.2 MIXED HYPERLIPIDEMIA: ICD-10-CM

## 2024-12-17 RX ORDER — SIMVASTATIN 10 MG/1
TABLET, FILM COATED ORAL
Qty: 45 TABLET | Refills: 3 | Status: SHIPPED | OUTPATIENT
Start: 2024-12-17

## 2025-01-09 PROCEDURE — RXMED WILLOW AMBULATORY MEDICATION CHARGE

## 2025-01-13 ENCOUNTER — PHARMACY VISIT (OUTPATIENT)
Dept: PHARMACY | Facility: CLINIC | Age: 85
End: 2025-01-13
Payer: MEDICARE

## 2025-01-20 NOTE — PROGRESS NOTES
Clinical Pharmacy Appointment    Patient ID: Tania Guerrero is a 84 y.o. female who presents for Med Management.    Pt is here for Follow Up    Referring Provider: Sue Ontiveros MD  PCP: Sue Murray MD   Last visit with PCP: 8/9/2024   Next visit with PCP: 4/18/2025      Subjective     Interval History  Initiated Jardiance for CKD.     HPI  Type II Diabetes  Current  Pharmacotherapy:   Jardiance 10 mg daily      SECONDARY PREVENTION  - Statin? Simvastatin 5 mg   LDL: 49   TC: 122  - ACE-I/ARB? Losartan 100 mg   UACR: 57.6 (7/2024)   BP: 134/83  - Aspirin? Yes    -The ASCVD Risk score (Israel ESPARZA, et al., 2019) failed to calculate for the following reasons:    The 2019 ASCVD risk score is only valid for ages 40 to 79    Risk score cannot be calculated because patient has a medical history suggesting prior/existing ASCVD       Pertinent PMH Review:  - PMH of Urinary Tract Infections: No      CKD  eGFR: 63  UACR: 57.6    ACE/ARB: losartan 100 mg   SGLT2: Jardiance 10 mg     Medication list reviewed: 9/11/2024 (date)    PULMONARY ASSESSMENT  Patient has been diagnosed with: COPD  does not see a pulmonologist    Current Regimen:  Trelegy 200-62.5-25 mcg daily  Alubterol HFA PRN    Appropriate Inhaler Technique? yes  How often do you use your rescue inhaler? N/A      Immunization History:  Influenza: Date [12/16/2022]  PCV13: Date [1/2017, 3/14/2016]  PPSV23: Date [10/2014]  PCV20: Date [--]  COVID: Date [--]  RSV: Date [---]    Smoking history:  She has a history of 45 pack years.      Drug Interactions  No relevant drug interactions were noted.        Objective   Allergies   Allergen Reactions    Levofloxacin Unknown    Nickel Unknown    Penicillins Unknown    Sulfa (Sulfonamide Antibiotics) Unknown     Social History     Social History Narrative    Not on file      Medication Review  Current Outpatient Medications   Medication Instructions    acetaminophen (TYLENOL 8 HOUR) 1,300 mg, Daily     albuterol (Ventolin HFA) 90 mcg/actuation inhaler 1 puff, Every 4 hours PRN    amLODIPine (NORVASC) 2.5 mg, oral, Daily    aspirin 81 mg EC tablet 1 tablet, Daily    cholecalciferol (Vitamin D-3) 50 mcg (2,000 unit) capsule 1 capsule, 2 times daily    cinnamon (CINNAMON) 500 mg, Daily    cyanocobalamin (VITAMIN B-12) 1,000 mcg, Daily    empagliflozin (JARDIANCE) 10 mg, oral, Daily    fish oil concentrate (Omega-3) 120-180 mg capsule 1 capsule, Daily    fluticasone-umeclidin-vilanter (TRELEGY-ELLIPTA) 200-62.5-25 mcg blister with device 1 puff, inhalation, Daily before breakfast    GARLIC PO 1 tablet, Daily    TEO OIL ORAL 1 capsule, Daily    latanoprost (Xalatan) 0.005 % ophthalmic solution 1 drop, Nightly    levothyroxine (SYNTHROID, LEVOXYL) 50 mcg, oral, Daily    losartan-hydrochlorothiazide (Hyzaar) 100-25 mg tablet 1 tablet, oral, Daily    magnesium oxide (MAG-OX) 250 mg, Daily    metoprolol succinate XL (TOPROL-XL) 100 mg, oral, Daily    multivitamin tablet 1 tablet, Daily    potassium gluconate 595 mg (99 mg) tablet 1 tablet, 2 times daily    simvastatin (Zocor) 10 mg tablet TAKE ONE-HALF OF A TABLET BY MOUTH ONCE DAILY AT BEDTIME    traMADol (ULTRAM) 50 mg, oral, Every 8 hours PRN    TURMERIC ORAL 1 tablet, Daily    ubidecarenone (coenzyme Q10) 100 mg tablet 1 tablet, Daily      Vitals  BP Readings from Last 2 Encounters:   11/20/24 126/76   08/09/24 134/83     BMI Readings from Last 1 Encounters:   11/20/24 32.12 kg/m²      Labs  A1C  Lab Results   Component Value Date    HGBA1C 6.8 (H) 07/30/2024    HGBA1C 6.6 (H) 12/11/2023    HGBA1C 6.5 (A) 06/02/2023     BMP  Lab Results   Component Value Date    CALCIUM 8.9 07/30/2024     07/30/2024    K 3.7 07/30/2024    CO2 27 07/30/2024     07/30/2024    BUN 19 07/30/2024    CREATININE 0.91 07/30/2024    EGFR 63 07/30/2024     LFTs  Lab Results   Component Value Date    ALT 35 07/30/2024    AST 16 07/30/2024    ALKPHOS 80 07/30/2024    BILITOT 0.4  07/30/2024     FLP  Lab Results   Component Value Date    TRIG 92 07/30/2024    CHOL 122 07/30/2024    LDLF 31 06/02/2023    LDLCALC 49 07/30/2024    HDL 54.2 07/30/2024     Urine Microalbumin  Lab Results   Component Value Date    MICROALBCREA 57.6 (H) 07/30/2024     Weight Management  Wt Readings from Last 3 Encounters:   11/20/24 79.7 kg (175 lb 9.6 oz)   08/09/24 79.7 kg (175 lb 9.6 oz)   04/22/24 77 kg (169 lb 12.1 oz)      There is no height or weight on file to calculate BMI.     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 She did feel like she had a yeast infection but treated it with OTC Monistat. [place] like it helped and is no longer having symptoms.       Plan:  CONTINUE Jardiance 10 mg daily  Rx to  [facility] for PAP         Relevant Medications    empagliflozin (Jardiance) 10 mg    Other Relevant Orders    Referral to Clinical Pharmacy    Diet-controlled type 2 diabetes mellitus     Patient's diabetes is currently well-controlled (A1c of 6.8%).    Patient has done well on therapy so far. We are going to continue therapy and monitor A1c.     Plan:  CONTINUE Jardiance 10 mg daily  CONTINUE diet and lifestyle management.         Relevant Orders    Referral to Clinical Pharmacy    Hypertension     Blood pressure was well controlled at last visit. Will continue to monitor.          Relevant Orders    Referral to Clinical Pharmacy    COPD (chronic obstructive pulmonary disease) (Multi)    Relevant Orders    Referral to Clinical Pharmacy     Patient Discussion:  Has some pain on the right side. Typically in the back. Moves to the front. Does say that she is red down there but states that it was like that prior to Jardiance initiation.      Clinical Pharmacist follow-up: 3 months (after PCP visit),  Telehealth visit    Continue all meds  under the continuation of care with the referring provider and clinical pharmacy team.    Thank you,  Lizeth Lazo, PharmD  Clinical Pharmacy Specialist     Verbal consent to manage patient's drug therapy was obtained from the patient. They were informed they may decline to participate or withdraw from participation in pharmacy services at any time.

## 2025-01-21 ENCOUNTER — APPOINTMENT (OUTPATIENT)
Dept: PHARMACY | Facility: HOSPITAL | Age: 85
End: 2025-01-21
Payer: MEDICARE

## 2025-01-21 DIAGNOSIS — I13.10 HYPERTENSIVE HEART AND KIDNEY DISEASE WITHOUT HEART FAILURE AND WITH STAGE 3A CHRONIC KIDNEY DISEASE: ICD-10-CM

## 2025-01-21 DIAGNOSIS — I10 PRIMARY HYPERTENSION: ICD-10-CM

## 2025-01-21 DIAGNOSIS — E11.9 DIET-CONTROLLED TYPE 2 DIABETES MELLITUS: ICD-10-CM

## 2025-01-21 DIAGNOSIS — N18.31 HYPERTENSIVE HEART AND KIDNEY DISEASE WITHOUT HEART FAILURE AND WITH STAGE 3A CHRONIC KIDNEY DISEASE: ICD-10-CM

## 2025-01-21 DIAGNOSIS — J44.9 CHRONIC OBSTRUCTIVE PULMONARY DISEASE, UNSPECIFIED COPD TYPE (MULTI): ICD-10-CM

## 2025-01-21 NOTE — ASSESSMENT & PLAN NOTE
Patient has been doing well since starting Jardiance. She did feel like she had a yeast infection but treated it with OTC Monistat. [place] like it helped and is no longer having symptoms.       Plan:  CONTINUE Jardiance 10 mg daily  Rx to [facility] for PAP

## 2025-02-07 ENCOUNTER — APPOINTMENT (OUTPATIENT)
Dept: PRIMARY CARE | Facility: CLINIC | Age: 85
End: 2025-02-07
Payer: MEDICARE

## 2025-02-10 ENCOUNTER — TELEMEDICINE (OUTPATIENT)
Dept: PHARMACY | Facility: HOSPITAL | Age: 85
End: 2025-02-10
Payer: MEDICARE

## 2025-02-10 DIAGNOSIS — I13.10 HYPERTENSIVE HEART AND KIDNEY DISEASE WITHOUT HEART FAILURE AND WITH STAGE 3A CHRONIC KIDNEY DISEASE: ICD-10-CM

## 2025-02-10 DIAGNOSIS — J44.9 CHRONIC OBSTRUCTIVE PULMONARY DISEASE, UNSPECIFIED COPD TYPE (MULTI): ICD-10-CM

## 2025-02-10 DIAGNOSIS — I10 PRIMARY HYPERTENSION: ICD-10-CM

## 2025-02-10 DIAGNOSIS — N18.31 HYPERTENSIVE HEART AND KIDNEY DISEASE WITHOUT HEART FAILURE AND WITH STAGE 3A CHRONIC KIDNEY DISEASE: ICD-10-CM

## 2025-02-10 DIAGNOSIS — E11.9 DIET-CONTROLLED TYPE 2 DIABETES MELLITUS: ICD-10-CM

## 2025-02-10 NOTE — PROGRESS NOTES
Clinical Pharmacy Appointment    Patient ID: Tania Guerrero is a 84 y.o. female who presents for Med Management.    Pt is here for Follow Up    Referring Provider: Sue Ontiveros MD  PCP: Sue Murray MD   Last visit with PCP: 8/9/2024   Next visit with PCP: 4/18/2025      Subjective     Interval History  Called in after seeing blood while wiping. Increased redness     HPI  Type II Diabetes  Current  Pharmacotherapy:   Jardiance 10 mg daily      SECONDARY PREVENTION  - Statin? Simvastatin 5 mg   LDL: 49   TC: 122  - ACE-I/ARB? Losartan 100 mg   UACR: 57.6 (7/2024)   BP: 134/83  - Aspirin? Yes    -The ASCVD Risk score ([place] ESPARZA, et al., 2019) failed to calculate for the following reasons:    The 2019 ASCVD risk score is only valid for ages 40 to 79    Risk score cannot be calculated because patient has a medical history suggesting prior/existing ASCVD       Pertinent PMH Review:  - PMH of Urinary Tract Infections: No      CKD  eGFR: 63  UACR: 57.6    ACE/ARB: losartan 100 mg   SGLT2: Jardiance 10 mg     Medication list reviewed: 9/11/2024 (date)    PULMONARY ASSESSMENT  Patient has been diagnosed with: COPD  does not see a pulmonologist    Current Regimen:  Trelegy 200-62.5-25 mcg daily  Alubterol HFA PRN    Appropriate Inhaler Technique? yes  How often do you use your rescue inhaler? N/A      Immunization History:  Influenza: Date [12/16/2022]  PCV13: Date [1/2017, 3/14/2016]  PPSV23: Date [10/2014]  PCV20: Date [--]  COVID: Date [--]  RSV: Date [---]    Smoking history:  She has a history of 45 pack years.      Drug Interactions  No relevant drug interactions were noted.        Objective   Allergies   Allergen Reactions    Levofloxacin Unknown    Nickel Unknown    Penicillins Unknown    Sulfa (Sulfonamide Antibiotics) Unknown     Social History     Social History Narrative    Not on file      Medication Review  Current Outpatient Medications   Medication Instructions    acetaminophen (TYLENOL 8  HOUR) 1,300 mg, Daily    albuterol (Ventolin HFA) 90 mcg/actuation inhaler 1 puff, Every 4 hours PRN    amLODIPine (NORVASC) 2.5 mg, oral, Daily    aspirin 81 mg EC tablet 1 tablet, Daily    cholecalciferol (Vitamin D-3) 50 mcg (2,000 unit) capsule 1 capsule, 2 times daily    cinnamon (CINNAMON) 500 mg, Daily    cyanocobalamin (VITAMIN B-12) 1,000 mcg, Daily    fish oil concentrate (Omega-3) 120-180 mg capsule 1 capsule, Daily    fluticasone-umeclidin-vilanter (TRELEGY-ELLIPTA) 200-62.5-25 mcg blister with device 1 puff, inhalation, Daily before breakfast    GARLIC PO 1 tablet, Daily    TEO OIL ORAL 1 capsule, Daily    latanoprost (Xalatan) 0.005 % ophthalmic solution 1 drop, Nightly    levothyroxine (SYNTHROID, LEVOXYL) 50 mcg, oral, Daily    losartan-hydrochlorothiazide (Hyzaar) 100-25 mg tablet 1 tablet, oral, Daily    magnesium oxide (MAG-OX) 250 mg, Daily    metoprolol succinate XL (TOPROL-XL) 100 mg, oral, Daily    multivitamin tablet 1 tablet, Daily    potassium gluconate 595 mg (99 mg) tablet 1 tablet, 2 times daily    simvastatin (Zocor) 10 mg tablet TAKE ONE-HALF OF A TABLET BY MOUTH ONCE DAILY AT BEDTIME    traMADol (ULTRAM) 50 mg, oral, Every 8 hours PRN    TURMERIC ORAL 1 tablet, Daily    ubidecarenone (coenzyme Q10) 100 mg tablet 1 tablet, Daily      Vitals  BP Readings from Last 2 Encounters:   11/20/24 126/76   08/09/24 134/83     BMI Readings from Last 1 Encounters:   11/20/24 32.12 kg/m²      Labs  A1C  Lab Results   Component Value Date    HGBA1C 6.8 (H) 07/30/2024    HGBA1C 6.6 (H) 12/11/2023    HGBA1C 6.5 (A) 06/02/2023     BMP  Lab Results   Component Value Date    CALCIUM 8.9 07/30/2024     07/30/2024    K 3.7 07/30/2024    CO2 27 07/30/2024     07/30/2024    BUN 19 07/30/2024    CREATININE 0.91 07/30/2024    EGFR 63 07/30/2024     LFTs  Lab Results   Component Value Date    ALT 35 07/30/2024    AST 16 07/30/2024    ALKPHOS 80 07/30/2024    BILITOT 0.4 07/30/2024     FLP  Lab  Results   Component Value Date    TRIG 92 07/30/2024    CHOL 122 07/30/2024    LDLF 31 06/02/2023    LDLCALC 49 07/30/2024    HDL 54.2 07/30/2024     Urine Microalbumin  Lab Results   Component Value Date    MICROALBCREA 57.6 (H) 07/30/2024     Weight Management  Wt Readings from Last 3 Encounters:   11/20/24 79.7 kg (175 lb 9.6 oz)   08/09/24 79.7 kg (175 lb 9.6 oz)   04/22/24 77 kg (169 lb 12.1 oz)      There is no height or weight on file to calculate BMI.     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 No other symtpoms noted. Given that this is not the first potential yeast infection, we agree to hold Jardiance until cause is determined.     I ask patient that if symptoms do not resolve within the next few days, to call the office or go to Urgent Care for treatment.       Plan:  STOP Jardiance 10 mg daily  [facility] to [facility]         Relevant Orders    Referral to Clinical Pharmacy    Diet-controlled type 2 diabetes mellitus    Relevant Orders    Referral to Clinical Pharmacy    Hypertension    Relevant Orders    Referral to Clinical Pharmacy    COPD (chronic obstructive pulmonary disease) (Multi)    Relevant Orders    Referral to Clinical Pharmacy       Clinical Pharmacist follow-up: 3/10 @ 10:40 AM,  Telehealth visit    Continue all meds under the continuation of care with the referring provider and clinical pharmacy team.    Thank you,  Lizeth Lazo, PharmD  Clinical Pharmacy Specialist     Verbal consent to manage patient's drug therapy was obtained from the patient. They were informed they may decline to participate or withdraw from participation in pharmacy services at any time.

## 2025-02-10 NOTE — ASSESSMENT & PLAN NOTE
Patient called stating that she is seeing blood when wiping after urination and significantly increased redness. No other symtpoms noted. Given that this is not the first potential yeast infection, we agree to hold Jardiance until cause is determined.     I ask patient that if symptoms do not resolve within the next few days, to call the office or go to Urgent Care for treatment.       Plan:  STOP Jardiance 10 mg daily  Rx to  [facility] for PAP

## 2025-02-10 NOTE — PROGRESS NOTES
HISTORY OF PRESENT ILLNESS:  Tania Guerrero is a 84 y.o. female who presents today for a follow up visit. She states that someone would like her to be on jardiance for her kidneys.  C/o vulvar irritation          Past Medical History  She has a past medical history of Disorder of kidney and ureter, unspecified (06/03/2021), Diverticulitis of intestine, part unspecified, without perforation or abscess without bleeding, Hydronephrosis, left (06/08/2023), Medullary cystic kidney, Old myocardial infarction, Other conditions influencing health status, Personal history of other diseases of the respiratory system (06/24/2019), Personal history of other infectious and parasitic diseases, Personal history of other specified conditions (06/11/2018), Personal history of urinary calculi, and Unspecified hydronephrosis (05/20/2021).    Surgical History  She has a past surgical history that includes Lithotripsy (05/16/2016); Other surgical history (06/24/2019); Other surgical history (12/02/2019); Other surgical history (12/02/2019); and Other surgical history (12/02/2019).     Social History  She reports that she quit smoking about 25 years ago. Her smoking use included cigarettes. She started smoking about 70 years ago. She has a 45 pack-year smoking history. She has never been exposed to tobacco smoke. She has never used smokeless tobacco. She reports that she does not currently use alcohol. She reports that she does not use drugs.    Family History  Family History   Problem Relation Name Age of Onset    No Known Problems Father          Allergies  Levofloxacin, Nickel, Penicillins, and Sulfa (sulfonamide antibiotics)      A comprehensive 10+ review of systems was negative except for: see hpi                          PHYSICAL EXAMINATION:  BP Readings from Last 3 Encounters:   11/20/24 126/76   08/09/24 134/83   04/22/24 105/65      Wt Readings from Last 3 Encounters:   11/20/24 79.7 kg (175 lb 9.6 oz)   08/09/24 79.7 kg  "(175 lb 9.6 oz)   04/22/24 77 kg (169 lb 12.1 oz)      BMI: Estimated body mass index is 32.12 kg/m² as calculated from the following:    Height as of 11/20/24: 1.575 m (5' 2\").    Weight as of 11/20/24: 79.7 kg (175 lb 9.6 oz).  BSA: Estimated body surface area is 1.87 meters squared as calculated from the following:    Height as of 11/20/24: 1.575 m (5' 2\").    Weight as of 11/20/24: 79.7 kg (175 lb 9.6 oz).  HEENT: Normocephalic, atraumatic, PER EOMI, nonicteric, trachea normal, thyroid normal, oropharynx normal.  CARDIAC: regular rate & rhythm, S1 & S2 normal.  No heaves, thrills, gallops or murmurs.  LUNGS: Clear to auscultation, no spinal or CV tenderness.  EXTREMITIES: No evidence of cyanosis, clubbing or edema.    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  The patient expressed understanding and agrees with the plan.     Reginald Dangelo MD    Scribe Attestation  By signing my name below, I, Margaret Aviles, Scribadriane   attest that this documentation has been prepared under the direction and in the presence of Reginald Dangelo MD.  "

## 2025-02-11 ENCOUNTER — TELEPHONE (OUTPATIENT)
Facility: HOSPITAL | Age: 85
End: 2025-02-11

## 2025-02-11 ENCOUNTER — APPOINTMENT (OUTPATIENT)
Dept: UROLOGY | Facility: CLINIC | Age: 85
End: 2025-02-11
Payer: MEDICARE

## 2025-02-11 VITALS
HEART RATE: 70 BPM | RESPIRATION RATE: 16 BRPM | DIASTOLIC BLOOD PRESSURE: 81 MMHG | SYSTOLIC BLOOD PRESSURE: 123 MMHG | OXYGEN SATURATION: 96 %

## 2025-02-11 DIAGNOSIS — N81.9 FEMALE GENITAL PROLAPSE, UNSPECIFIED TYPE: ICD-10-CM

## 2025-02-11 DIAGNOSIS — L28.0 LICHEN SIMPLEX CHRONICUS: ICD-10-CM

## 2025-02-11 DIAGNOSIS — N32.81 OAB (OVERACTIVE BLADDER): Primary | ICD-10-CM

## 2025-02-11 DIAGNOSIS — N95.8 GENITOURINARY SYNDROME OF MENOPAUSE: ICD-10-CM

## 2025-02-11 PROCEDURE — 1159F MED LIST DOCD IN RCRD: CPT | Performed by: STUDENT IN AN ORGANIZED HEALTH CARE EDUCATION/TRAINING PROGRAM

## 2025-02-11 PROCEDURE — 99214 OFFICE O/P EST MOD 30 MIN: CPT | Performed by: STUDENT IN AN ORGANIZED HEALTH CARE EDUCATION/TRAINING PROGRAM

## 2025-02-11 PROCEDURE — 1123F ACP DISCUSS/DSCN MKR DOCD: CPT | Performed by: STUDENT IN AN ORGANIZED HEALTH CARE EDUCATION/TRAINING PROGRAM

## 2025-02-11 PROCEDURE — 3079F DIAST BP 80-89 MM HG: CPT | Performed by: STUDENT IN AN ORGANIZED HEALTH CARE EDUCATION/TRAINING PROGRAM

## 2025-02-11 PROCEDURE — G2211 COMPLEX E/M VISIT ADD ON: HCPCS | Performed by: STUDENT IN AN ORGANIZED HEALTH CARE EDUCATION/TRAINING PROGRAM

## 2025-02-11 PROCEDURE — 3074F SYST BP LT 130 MM HG: CPT | Performed by: STUDENT IN AN ORGANIZED HEALTH CARE EDUCATION/TRAINING PROGRAM

## 2025-02-11 RX ORDER — ASCORBIC ACID 500 MG
500 TABLET ORAL DAILY
COMMUNITY

## 2025-02-11 RX ORDER — CLOTRIMAZOLE AND BETAMETHASONE DIPROPIONATE 10; .64 MG/G; MG/G
CREAM TOPICAL
Qty: 45 G | Refills: 11 | Status: SHIPPED | OUTPATIENT
Start: 2025-02-11

## 2025-02-11 ASSESSMENT — ENCOUNTER SYMPTOMS
OCCASIONAL FEELINGS OF UNSTEADINESS: 1
DEPRESSION: 0
LOSS OF SENSATION IN FEET: 0

## 2025-03-04 LAB
ALBUMIN SERPL-MCNC: 4.1 G/DL (ref 3.6–5.1)
ALBUMIN/CREAT UR: 43 MG/G CREAT
ALP SERPL-CCNC: 77 U/L (ref 37–153)
ALT SERPL-CCNC: 39 U/L (ref 6–29)
ANION GAP SERPL CALCULATED.4IONS-SCNC: 7 MMOL/L (CALC) (ref 7–17)
AST SERPL-CCNC: 17 U/L (ref 10–35)
BILIRUB SERPL-MCNC: 0.5 MG/DL (ref 0.2–1.2)
BUN SERPL-MCNC: 21 MG/DL (ref 7–25)
CALCIUM SERPL-MCNC: 9.4 MG/DL (ref 8.6–10.4)
CHLORIDE SERPL-SCNC: 104 MMOL/L (ref 98–110)
CHOLEST SERPL-MCNC: 119 MG/DL
CHOLEST/HDLC SERPL: 2 (CALC)
CO2 SERPL-SCNC: 28 MMOL/L (ref 20–32)
CREAT SERPL-MCNC: 0.95 MG/DL (ref 0.6–0.95)
CREAT UR-MCNC: 46 MG/DL (ref 20–275)
EGFRCR SERPLBLD CKD-EPI 2021: 59 ML/MIN/1.73M2
EST. AVERAGE GLUCOSE BLD GHB EST-MCNC: 148 MG/DL
EST. AVERAGE GLUCOSE BLD GHB EST-SCNC: 8.2 MMOL/L
GLUCOSE SERPL-MCNC: 112 MG/DL (ref 65–99)
HBA1C MFR BLD: 6.8 % OF TOTAL HGB
HDLC SERPL-MCNC: 60 MG/DL
LDLC SERPL CALC-MCNC: 43 MG/DL (CALC)
LDLC SERPL DIRECT ASSAY-MCNC: 53 MG/DL
MICROALBUMIN UR-MCNC: 2 MG/DL
NONHDLC SERPL-MCNC: 59 MG/DL (CALC)
POTASSIUM SERPL-SCNC: 4 MMOL/L (ref 3.5–5.3)
PROT SERPL-MCNC: 6.4 G/DL (ref 6.1–8.1)
SODIUM SERPL-SCNC: 139 MMOL/L (ref 135–146)
TRIGL SERPL-MCNC: 78 MG/DL
TSH SERPL-ACNC: 1.56 MIU/L (ref 0.4–4.5)
VIT B12 SERPL-MCNC: 1078 PG/ML (ref 200–1100)

## 2025-03-06 NOTE — PROGRESS NOTES
Clinical Pharmacy Appointment    Patient ID: Tania Guerrero is a 85 y.o. female who presents for Med Management.    Pt is here for Follow Up    Referring Provider: Sue Ontiveros MD  PCP: Sue Murray MD   Last visit with PCP: 8/9/2024   Next visit with PCP: 4/18/2025      Subjective     Interval History  Stopped Jardiance after having blood while wiping    HPI  Type II Diabetes  Current  Pharmacotherapy:   None     SECONDARY PREVENTION  - Statin? Simvastatin 5 mg   LDL: 53   TC: 119  - ACE-I/ARB? Losartan 100 mg   UACR: 43   BP: 134/83  - Aspirin? Yes    -The ASCVD Risk score ([place] ESPARZA, et al., 2019) failed to calculate for the following reasons:    The 2019 ASCVD risk score is only valid for ages 40 to 79    Risk score cannot be calculated because patient has a medical history suggesting prior/existing ASCVD       Pertinent PMH Review:  - PMH of Urinary Tract Infections: No      CKD  eGFR: 59  UACR: 43    ACE/ARB: losartan 100 mg   SGLT2: No (AE with Jardiance)    Medication list reviewed: 9/11/2024 (date)    PULMONARY ASSESSMENT  Patient has been diagnosed with: COPD  does not see a pulmonologist    Current Regimen:  Trelegy 200-62.5-25 mcg daily  Alubterol HFA PRN    Appropriate Inhaler Technique? yes  How often do you use your rescue inhaler? N/A      Immunization History:  Influenza: Date [12/16/2022]  PCV13: Date [1/2017, 3/14/2016]  PPSV23: Date [10/2014]  PCV20: Date [--]  COVID: Date [--]  RSV: Date [---]    Smoking history:  She has a history of 45 pack years.      Drug Interactions  No relevant drug interactions were noted.        Objective   Allergies   Allergen Reactions    Levofloxacin Unknown    Nickel Unknown    Penicillins Unknown    Sulfa (Sulfonamide Antibiotics) Unknown     Social History     Social History Narrative    Not on file      Medication Review  Current Outpatient Medications   Medication Instructions    acetaminophen (TYLENOL 8 HOUR) 1,300 mg, Daily    albuterol  (Ventolin HFA) 90 mcg/actuation inhaler 1 puff, Every 4 hours PRN    amLODIPine (NORVASC) 2.5 mg, oral, Daily    ascorbic acid (VITAMIN C) 500 mg, oral, Daily    aspirin 81 mg EC tablet 1 tablet, Daily    cholecalciferol (Vitamin D-3) 50 mcg (2,000 unit) capsule 1 capsule, 2 times daily    cinnamon (CINNAMON) 500 mg, Daily    clotrimazole-betamethasone (Lotrisone) cream Use 2x/day for 4 weeks, then daily for 2 weeks, then every other day for 2 weeks, then 2x/week indefinitely    cyanocobalamin (VITAMIN B-12) 1,000 mcg, Daily    fish oil concentrate (Omega-3) 120-180 mg capsule 1 capsule, Daily    fluticasone-umeclidin-vilanter (TRELEGY-ELLIPTA) 200-62.5-25 mcg blister with device 1 puff, inhalation, Daily before breakfast    GARLIC PO 1 tablet, Daily    TEO OIL ORAL 1 capsule, Daily    latanoprost (Xalatan) 0.005 % ophthalmic solution 1 drop, Nightly    levothyroxine (SYNTHROID, LEVOXYL) 50 mcg, oral, Daily    losartan-hydrochlorothiazide (Hyzaar) 100-25 mg tablet 1 tablet, oral, Daily    magnesium oxide (MAG-OX) 250 mg, Daily    metoprolol succinate XL (TOPROL-XL) 100 mg, oral, Daily    multivitamin tablet 1 tablet, Daily    potassium gluconate 595 mg (99 mg) tablet 1 tablet, 2 times daily    simvastatin (Zocor) 10 mg tablet TAKE ONE-HALF OF A TABLET BY MOUTH ONCE DAILY AT BEDTIME    TURMERIC ORAL 1 tablet, Daily    ubidecarenone (coenzyme Q10) 100 mg tablet 1 tablet, Daily      Vitals  BP Readings from Last 2 Encounters:   02/11/25 123/81   11/20/24 126/76     BMI Readings from Last 1 Encounters:   11/20/24 32.12 kg/m²      Labs  A1C  Lab Results   Component Value Date    HGBA1C 6.8 (H) 03/03/2025    HGBA1C 6.8 (H) 07/30/2024    HGBA1C 6.6 (H) 12/11/2023     BMP  Lab Results   Component Value Date    CALCIUM 9.4 03/03/2025     03/03/2025    K 4.0 03/03/2025    CO2 28 03/03/2025     03/03/2025    BUN 21 03/03/2025    CREATININE 0.95 03/03/2025    EGFR 59 (L) 03/03/2025     LFTs  Lab Results    Component Value Date    ALT 39 (H) 03/03/2025    AST 17 03/03/2025    ALKPHOS 77 03/03/2025    BILITOT 0.5 03/03/2025     FLP  Lab Results   Component Value Date    TRIG 78 03/03/2025    CHOL 119 03/03/2025    LDLF 31 06/02/2023    LDLCALC 43 03/03/2025    HDL 60 03/03/2025     Urine Microalbumin  Lab Results   Component Value Date    MICROALBCREA 43 (H) 03/03/2025     Weight Management  Wt Readings from Last 3 Encounters:   11/20/24 79.7 kg (175 lb 9.6 oz)   08/09/24 79.7 kg (175 lb 9.6 oz)   04/22/24 77 kg (169 lb 12.1 oz)      There is no height or weight on file to calculate BMI.     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 She is using clotrimazole cream (rx per Dr. Dangelo) for a potential yeast infection.     eGFR decreased slightly and UACR improved (not yet at goal). She is  not interested in trying Farxiga at this time. Recommending a low-sodium diet and proper hydration.       CONTINUE  losartan-hydrochlorothiazide 100 mg -25 mg         Relevant Orders    Referral to Clinical Pharmacy    Diet-controlled type 2 diabetes mellitus     Patient's diabetes is currently well-controlled (A1c of 6.8%).      Plan:  CONTINUE diet and lifestyle management.         Relevant Orders    Referral to Clinical Pharmacy    Hypertension     Blood pressure was well controlled at last visit. Will continue to monitor.          Relevant Orders    Referral to Clinical Pharmacy    COPD (chronic obstructive pulmonary disease) (Multi)    Relevant Orders    Referral to Clinical Pharmacy         Clinical Pharmacist follow-up: for PAP renewal,  Telehealth visit    Continue all meds under the continuation of care with the referring provider and clinical pharmacy team.    Thank you,  Lizeth Lazo, PharmD  Clinical Pharmacy Specialist     Verbal consent  to manage patient's drug therapy was obtained from the patient. They were informed they may decline to participate or withdraw from participation in pharmacy services at any time.

## 2025-03-10 ENCOUNTER — APPOINTMENT (OUTPATIENT)
Dept: PHARMACY | Facility: HOSPITAL | Age: 85
End: 2025-03-10
Payer: MEDICARE

## 2025-03-10 DIAGNOSIS — I13.10 HYPERTENSIVE HEART AND KIDNEY DISEASE WITHOUT HEART FAILURE AND WITH STAGE 3A CHRONIC KIDNEY DISEASE: ICD-10-CM

## 2025-03-10 DIAGNOSIS — N18.31 HYPERTENSIVE HEART AND KIDNEY DISEASE WITHOUT HEART FAILURE AND WITH STAGE 3A CHRONIC KIDNEY DISEASE: ICD-10-CM

## 2025-03-10 DIAGNOSIS — E11.9 DIET-CONTROLLED TYPE 2 DIABETES MELLITUS: ICD-10-CM

## 2025-03-10 DIAGNOSIS — J44.9 CHRONIC OBSTRUCTIVE PULMONARY DISEASE, UNSPECIFIED COPD TYPE (MULTI): ICD-10-CM

## 2025-03-10 DIAGNOSIS — I10 PRIMARY HYPERTENSION: ICD-10-CM

## 2025-03-10 NOTE — ASSESSMENT & PLAN NOTE
Patient's diabetes is currently well-controlled (A1c of 6.8%).      Plan:  CONTINUE diet and lifestyle management.

## 2025-03-10 NOTE — ASSESSMENT & PLAN NOTE
Patient is feeling better now that she is off her Jardiance. She is using clotrimazole cream (rx per Dr. Dangelo) for a potential yeast infection.     eGFR decreased slightly and UACR improved (not yet at goal). She is  not interested in trying Farxiga at this time. Recommending a low-sodium diet and proper hydration.       CONTINUE  losartan-hydrochlorothiazide 100 mg -25 mg

## 2025-03-19 ENCOUNTER — TELEPHONE (OUTPATIENT)
Dept: PHARMACY | Facility: HOSPITAL | Age: 85
End: 2025-03-19
Payer: MEDICARE

## 2025-03-19 ENCOUNTER — HOSPITAL ENCOUNTER (EMERGENCY)
Facility: HOSPITAL | Age: 85
Discharge: HOME | End: 2025-03-19
Payer: MEDICARE

## 2025-03-19 VITALS
DIASTOLIC BLOOD PRESSURE: 55 MMHG | WEIGHT: 175 LBS | OXYGEN SATURATION: 96 % | HEIGHT: 62 IN | SYSTOLIC BLOOD PRESSURE: 116 MMHG | HEART RATE: 74 BPM | BODY MASS INDEX: 32.2 KG/M2 | TEMPERATURE: 98.6 F | RESPIRATION RATE: 16 BRPM

## 2025-03-19 DIAGNOSIS — N39.0 URINARY TRACT INFECTION WITH HEMATURIA, SITE UNSPECIFIED: Primary | ICD-10-CM

## 2025-03-19 DIAGNOSIS — R31.9 URINARY TRACT INFECTION WITH HEMATURIA, SITE UNSPECIFIED: Primary | ICD-10-CM

## 2025-03-19 LAB
APPEARANCE UR: ABNORMAL
BACTERIA #/AREA URNS AUTO: ABNORMAL /HPF
BILIRUB UR STRIP.AUTO-MCNC: NEGATIVE MG/DL
CLUE CELLS SPEC QL WET PREP: NORMAL
COLOR UR: ABNORMAL
GLUCOSE UR STRIP.AUTO-MCNC: NORMAL MG/DL
HOLD SPECIMEN: NORMAL
KETONES UR STRIP.AUTO-MCNC: NEGATIVE MG/DL
LEUKOCYTE ESTERASE UR QL STRIP.AUTO: ABNORMAL
MUCOUS THREADS #/AREA URNS AUTO: ABNORMAL /LPF
NITRITE UR QL STRIP.AUTO: NEGATIVE
PH UR STRIP.AUTO: 6.5 [PH]
PROT UR STRIP.AUTO-MCNC: ABNORMAL MG/DL
RBC # UR STRIP.AUTO: ABNORMAL MG/DL
RBC #/AREA URNS AUTO: >20 /HPF
SP GR UR STRIP.AUTO: 1.02
SQUAMOUS #/AREA URNS AUTO: ABNORMAL /HPF
T VAGINALIS SPEC QL WET PREP: NORMAL
UROBILINOGEN UR STRIP.AUTO-MCNC: NORMAL MG/DL
WBC #/AREA URNS AUTO: >50 /HPF
WBC CLUMPS #/AREA URNS AUTO: ABNORMAL /HPF
WBC VAG QL WET PREP: NORMAL
YEAST VAG QL WET PREP: NORMAL

## 2025-03-19 PROCEDURE — 99283 EMERGENCY DEPT VISIT LOW MDM: CPT

## 2025-03-19 PROCEDURE — 87210 SMEAR WET MOUNT SALINE/INK: CPT | Performed by: NURSE PRACTITIONER

## 2025-03-19 PROCEDURE — 81001 URINALYSIS AUTO W/SCOPE: CPT | Performed by: NURSE PRACTITIONER

## 2025-03-19 PROCEDURE — 87086 URINE CULTURE/COLONY COUNT: CPT | Mod: PORLAB | Performed by: NURSE PRACTITIONER

## 2025-03-19 RX ORDER — CEPHALEXIN 500 MG/1
500 CAPSULE ORAL 2 TIMES DAILY
Qty: 14 CAPSULE | Refills: 0 | Status: SHIPPED | OUTPATIENT
Start: 2025-03-19 | End: 2025-03-26

## 2025-03-19 ASSESSMENT — PAIN SCALES - GENERAL: PAINLEVEL_OUTOF10: 0 - NO PAIN

## 2025-03-19 ASSESSMENT — PAIN - FUNCTIONAL ASSESSMENT: PAIN_FUNCTIONAL_ASSESSMENT: 0-10

## 2025-03-19 ASSESSMENT — LIFESTYLE VARIABLES
HAVE YOU EVER FELT YOU SHOULD CUT DOWN ON YOUR DRINKING: NO
HAVE PEOPLE ANNOYED YOU BY CRITICIZING YOUR DRINKING: NO
EVER HAD A DRINK FIRST THING IN THE MORNING TO STEADY YOUR NERVES TO GET RID OF A HANGOVER: NO
TOTAL SCORE: 0
EVER FELT BAD OR GUILTY ABOUT YOUR DRINKING: NO

## 2025-03-19 ASSESSMENT — COLUMBIA-SUICIDE SEVERITY RATING SCALE - C-SSRS
6. HAVE YOU EVER DONE ANYTHING, STARTED TO DO ANYTHING, OR PREPARED TO DO ANYTHING TO END YOUR LIFE?: NO
1. IN THE PAST MONTH, HAVE YOU WISHED YOU WERE DEAD OR WISHED YOU COULD GO TO SLEEP AND NOT WAKE UP?: NO
2. HAVE YOU ACTUALLY HAD ANY THOUGHTS OF KILLING YOURSELF?: NO

## 2025-03-19 NOTE — ED PROVIDER NOTES
HPI   Chief Complaint   Patient presents with    Vaginal Bleeding       This is a 85-year-old  female that is presenting to the emergency room with suspected vaginal bleeding.  The patient had a uterovaginal prolapse that was repaired by Dr. Dangelo in November 2023.  The patient reports that she has been having vaginal irritation for the past couple of months.  She was seen by Dr. Dangelo a month ago.  She was given clotrimazole cream that she is supposed to apply to the affected area twice daily for a month and taper down for the next few weeks.  She has been applying the cream as directed.  She states that she is not having any significant improvement.  Over the last few days, she has noticed that she had blood on her tissue when she wiped.  She is uncertain if it is coming from her urinary tract, vaginal tract, or rectal tract.  She is not having any abdominal pain.  She has a pinching sensation when she urinates.  She is not urinating more frequently.  No obvious blood in her urine.  Denies any fevers, chills, nausea, vomiting.  The patient is not on any blood thinners.      History provided by:  Patient   used: No            Patient History   Past Medical History:   Diagnosis Date    Disorder of kidney and ureter, unspecified 06/03/2021    Lesion of left native ureter    Diverticulitis of intestine, part unspecified, without perforation or abscess without bleeding     Diverticulitis    Hydronephrosis, left 06/08/2023    Medullary cystic kidney     Kidney, medullary sponge    Old myocardial infarction     History of acute anterior wall MI    Other conditions influencing health status     History of urethral stent    Personal history of other diseases of the respiratory system 06/24/2019    History of chronic obstructive lung disease    Personal history of other infectious and parasitic diseases     History of Clostridium difficile infection    Personal history of other specified  conditions 2018    History of dizziness    Personal history of urinary calculi     History of kidney stones    Unspecified hydronephrosis 2021    Hydronephrosis, left     Past Surgical History:   Procedure Laterality Date    LITHOTRIPSY  2016    Renal Lithotripsy    OTHER SURGICAL HISTORY  2019    Tooth extraction    OTHER SURGICAL HISTORY  2019    Tonsillectomy    OTHER SURGICAL HISTORY  2019    Dilation and curettage    OTHER SURGICAL HISTORY  2019    Hernia repair     Family History   Problem Relation Name Age of Onset    No Known Problems Father       Social History     Tobacco Use    Smoking status: Former     Current packs/day: 0.00     Average packs/day: 1 pack/day for 45.0 years (45.0 ttl pk-yrs)     Types: Cigarettes     Start date:      Quit date:      Years since quittin.2     Passive exposure: Never    Smokeless tobacco: Never   Vaping Use    Vaping status: Never Used   Substance Use Topics    Alcohol use: Not Currently    Drug use: Never       Physical Exam   ED Triage Vitals [25 1021]   Temperature Heart Rate Respirations BP   37 °C (98.6 °F) 95 16 126/66      Pulse Ox Temp src Heart Rate Source Patient Position   95 % -- -- --      BP Location FiO2 (%)     -- --       Physical Exam  Vitals and nursing note reviewed.   Cardiovascular:      Rate and Rhythm: Normal rate and regular rhythm.      Pulses: Normal pulses.      Heart sounds: Normal heart sounds.   Pulmonary:      Effort: Pulmonary effort is normal.      Breath sounds: Normal breath sounds.   Abdominal:      General: Bowel sounds are normal.      Palpations: Abdomen is soft.   Genitourinary:     Comments: The patient has vulvar irritation with excoriation noted to the vaginal opening.  There is blood noted near the urethra and the vaginal orifice.  A pelvic exam was not performed due to her prolapse repair.  No obvious rectal bleeding appreciated.  Musculoskeletal:         General:  Normal range of motion.   Skin:     General: Skin is warm.      Capillary Refill: Capillary refill takes less than 2 seconds.   Neurological:      General: No focal deficit present.      Mental Status: She is alert.   Psychiatric:         Mood and Affect: Mood normal.           ED Course & MDM   Diagnoses as of 03/19/25 1505   Urinary tract infection with hematuria, site unspecified                 No data recorded     [place] Coma Scale Score: 15 (03/19/25 1022 : Andree Grigsby RN)                           Medical Decision Making  Patient was seen and evaluated by the nurse practitioner, Lula Christina.  The patient is presenting to the emergency room with concerns for vaginal bleeding.  Patient is not on any blood thinners.  She is not have any abdominal pain.  Patient does not have any rectal pain.  Pelvic examination was performed.  The patient had blood near her urethral opening and towards the opening of her vagina.  A wet prep was performed to evaluate for possible Candida or bacterial vaginosis.  The wet prep was negative.  Urine sample was obtained showed brown color, 3+ blood, 500 leukocyte esterase, greater than 50 WBCs, greater than 20 RBCs, and 2+ positive bacteria.  The sample will be sent for culture and sensitivity.  We believe that the patient is most likely suffering from a urinary tract infection.  We believe that is the source of the blood.  The patient was provided prescription for Keflex for home administration.  She is to follow-up with her urologist in the next 2 to 3 days.  She is to return if worse in any way.  The patient was discharged in stable condition with computer discharge instructions given.        Procedure  Procedures     LEXII Mendoza-CNP  03/19/25 1502

## 2025-03-19 NOTE — TELEPHONE ENCOUNTER
Tania called in this morning to ask my opinion on her continued vaginal bleeding.     She has stopped using the cream provided by Dr. Dangelo. She is seeing bright red blood almost daily .    I do recommend that she call Dr. Dangelo or seek treatment at Urgent Care/ED, as I am unable to prescribe anything for her over the phone.     Lizeth Lazo, PharmD  Clinical Pharmacy Specialist    asthma

## 2025-03-19 NOTE — ED TRIAGE NOTES
Pt. Arrived to the ED via private car for c/o vaginal bleeding. Pt. States that 4 weeks ago she went to the doctors and they informed her she had a yeast infection and they gave her a cream to place on the area. Pt. States that she completed the full four weeks and her vaginal area is still red and inflamed and she started noticing vaginal bleeding on Sunday

## 2025-03-22 LAB — BACTERIA UR CULT: ABNORMAL

## 2025-03-24 ENCOUNTER — TELEPHONE (OUTPATIENT)
Dept: PHARMACY | Facility: HOSPITAL | Age: 85
End: 2025-03-24
Payer: MEDICARE

## 2025-03-24 NOTE — PROGRESS NOTES
EDPD Note: Antibiotics Reviewed and Warranted    Contacted Mr./Mrs./Ms. Tania Guerrero regarding a positive urine culture/result that was taken during their recent emergency room visit. I completed education with patient . The patient is being treated appropriately with Keflex 500 mg BID x 7 days.     Pt seen in ED for vaginal bleeding. Pt also reported pinching sensation when urinating. Was discharged with keflex 500mg BID x 7days, which is susceptible.  Today pt reported resolution of symptoms, advised to continue abx till finished.     Susceptibility data from last 90 days.  Collected Specimen Info Organism Amoxicillin/Clavulanate Ampicillin Ampicillin/Sulbactam Cefazolin Cefazolin (uncomplicated UTIs only) Ciprofloxacin Gentamicin Nitrofurantoin Piperacillin/Tazobactam Trimethoprim/Sulfamethoxazole   03/19/25 Urine from Clean Catch/Voided Klebsiella pneumoniae/variicola  S  R  I  S  S  S  S  S  S  S        No further follow up needed from EDPD Team.     Berny Flannery [facility]

## 2025-04-09 PROCEDURE — RXMED WILLOW AMBULATORY MEDICATION CHARGE

## 2025-04-12 PROBLEM — R74.01 ELEVATED ALT MEASUREMENT: Status: ACTIVE | Noted: 2025-04-12

## 2025-04-12 PROBLEM — Z00.00 ANNUAL PHYSICAL EXAM: Status: ACTIVE | Noted: 2025-04-12

## 2025-04-14 ENCOUNTER — PHARMACY VISIT (OUTPATIENT)
Dept: PHARMACY | Facility: CLINIC | Age: 85
End: 2025-04-14
Payer: MEDICARE

## 2025-04-18 ENCOUNTER — APPOINTMENT (OUTPATIENT)
Dept: PRIMARY CARE | Facility: CLINIC | Age: 85
End: 2025-04-18
Payer: MEDICARE

## 2025-04-24 ENCOUNTER — APPOINTMENT (OUTPATIENT)
Dept: RADIOLOGY | Facility: HOSPITAL | Age: 85
End: 2025-04-24
Payer: MEDICARE

## 2025-04-24 ENCOUNTER — HOSPITAL ENCOUNTER (EMERGENCY)
Facility: HOSPITAL | Age: 85
Discharge: HOME | End: 2025-04-24
Attending: EMERGENCY MEDICINE
Payer: MEDICARE

## 2025-04-24 ENCOUNTER — APPOINTMENT (OUTPATIENT)
Dept: CARDIOLOGY | Facility: HOSPITAL | Age: 85
End: 2025-04-24
Payer: MEDICARE

## 2025-04-24 VITALS
OXYGEN SATURATION: 99 % | TEMPERATURE: 96.8 F | BODY MASS INDEX: 32.2 KG/M2 | HEART RATE: 60 BPM | HEIGHT: 62 IN | RESPIRATION RATE: 14 BRPM | SYSTOLIC BLOOD PRESSURE: 145 MMHG | DIASTOLIC BLOOD PRESSURE: 88 MMHG | WEIGHT: 175 LBS

## 2025-04-24 DIAGNOSIS — R07.9 CHEST PAIN, UNSPECIFIED TYPE: Primary | ICD-10-CM

## 2025-04-24 LAB
ANION GAP BLDV CALCULATED.4IONS-SCNC: 9 MMOL/L (ref 10–25)
ANION GAP SERPL CALC-SCNC: 14 MMOL/L (ref 10–20)
BASE EXCESS BLDV CALC-SCNC: 3.3 MMOL/L (ref -2–3)
BASOPHILS # BLD AUTO: 0.06 X10*3/UL (ref 0–0.1)
BASOPHILS NFR BLD AUTO: 0.8 %
BNP SERPL-MCNC: 64 PG/ML (ref 0–99)
BODY TEMPERATURE: 37 DEGREES CELSIUS
BUN SERPL-MCNC: 21 MG/DL (ref 6–23)
CA-I BLDV-SCNC: 1.19 MMOL/L (ref 1.1–1.33)
CALCIUM SERPL-MCNC: 9.1 MG/DL (ref 8.6–10.3)
CARDIAC TROPONIN I PNL SERPL HS: 7 NG/L (ref 0–13)
CARDIAC TROPONIN I PNL SERPL HS: 8 NG/L (ref 0–13)
CHLORIDE BLDV-SCNC: 103 MMOL/L (ref 98–107)
CHLORIDE SERPL-SCNC: 104 MMOL/L (ref 98–107)
CO2 SERPL-SCNC: 25 MMOL/L (ref 21–32)
CREAT SERPL-MCNC: 1.03 MG/DL (ref 0.5–1.05)
EGFRCR SERPLBLD CKD-EPI 2021: 54 ML/MIN/1.73M*2
EOSINOPHIL # BLD AUTO: 0.18 X10*3/UL (ref 0–0.4)
EOSINOPHIL NFR BLD AUTO: 2.5 %
ERYTHROCYTE [DISTWIDTH] IN BLOOD BY AUTOMATED COUNT: 13.8 % (ref 11.5–14.5)
GLUCOSE BLDV-MCNC: 128 MG/DL (ref 74–99)
GLUCOSE SERPL-MCNC: 129 MG/DL (ref 74–99)
HCO3 BLDV-SCNC: 26.9 MMOL/L (ref 22–26)
HCT VFR BLD AUTO: 42.7 % (ref 36–46)
HCT VFR BLD EST: 44 % (ref 36–46)
HGB BLD-MCNC: 14.1 G/DL (ref 12–16)
HGB BLDV-MCNC: 14.6 G/DL (ref 12–16)
IMM GRANULOCYTES # BLD AUTO: 0.02 X10*3/UL (ref 0–0.5)
IMM GRANULOCYTES NFR BLD AUTO: 0.3 % (ref 0–0.9)
INHALED O2 CONCENTRATION: 21 %
LACTATE BLDV-SCNC: 1.2 MMOL/L (ref 0.4–2)
LYMPHOCYTES # BLD AUTO: 2.09 X10*3/UL (ref 0.8–3)
LYMPHOCYTES NFR BLD AUTO: 28.9 %
MAGNESIUM SERPL-MCNC: 1.88 MG/DL (ref 1.6–2.4)
MCH RBC QN AUTO: 31.2 PG (ref 26–34)
MCHC RBC AUTO-ENTMCNC: 33 G/DL (ref 32–36)
MCV RBC AUTO: 95 FL (ref 80–100)
MONOCYTES # BLD AUTO: 0.66 X10*3/UL (ref 0.05–0.8)
MONOCYTES NFR BLD AUTO: 9.1 %
NEUTROPHILS # BLD AUTO: 4.22 X10*3/UL (ref 1.6–5.5)
NEUTROPHILS NFR BLD AUTO: 58.4 %
NRBC BLD-RTO: 0 /100 WBCS (ref 0–0)
OXYHGB MFR BLDV: 64.1 % (ref 45–75)
PCO2 BLDV: 37 MM HG (ref 41–51)
PH BLDV: 7.47 PH (ref 7.33–7.43)
PLATELET # BLD AUTO: 295 X10*3/UL (ref 150–450)
PO2 BLDV: 38 MM HG (ref 35–45)
POTASSIUM BLDV-SCNC: 3.5 MMOL/L (ref 3.5–5.3)
POTASSIUM SERPL-SCNC: 3.6 MMOL/L (ref 3.5–5.3)
RBC # BLD AUTO: 4.52 X10*6/UL (ref 4–5.2)
SAO2 % BLDV: 65 % (ref 45–75)
SODIUM BLDV-SCNC: 135 MMOL/L (ref 136–145)
SODIUM SERPL-SCNC: 139 MMOL/L (ref 136–145)
WBC # BLD AUTO: 7.2 X10*3/UL (ref 4.4–11.3)

## 2025-04-24 PROCEDURE — 93005 ELECTROCARDIOGRAM TRACING: CPT

## 2025-04-24 PROCEDURE — 36415 COLL VENOUS BLD VENIPUNCTURE: CPT | Performed by: EMERGENCY MEDICINE

## 2025-04-24 PROCEDURE — 85018 HEMOGLOBIN: CPT | Mod: 59 | Performed by: EMERGENCY MEDICINE

## 2025-04-24 PROCEDURE — 71045 X-RAY EXAM CHEST 1 VIEW: CPT

## 2025-04-24 PROCEDURE — 84484 ASSAY OF TROPONIN QUANT: CPT | Performed by: EMERGENCY MEDICINE

## 2025-04-24 PROCEDURE — 83735 ASSAY OF MAGNESIUM: CPT | Performed by: EMERGENCY MEDICINE

## 2025-04-24 PROCEDURE — 71045 X-RAY EXAM CHEST 1 VIEW: CPT | Performed by: RADIOLOGY

## 2025-04-24 PROCEDURE — 83880 ASSAY OF NATRIURETIC PEPTIDE: CPT | Performed by: EMERGENCY MEDICINE

## 2025-04-24 PROCEDURE — 82435 ASSAY OF BLOOD CHLORIDE: CPT | Mod: 59 | Performed by: EMERGENCY MEDICINE

## 2025-04-24 PROCEDURE — 99285 EMERGENCY DEPT VISIT HI MDM: CPT | Mod: 25 | Performed by: EMERGENCY MEDICINE

## 2025-04-24 PROCEDURE — 85025 COMPLETE CBC W/AUTO DIFF WBC: CPT | Performed by: EMERGENCY MEDICINE

## 2025-04-24 ASSESSMENT — LIFESTYLE VARIABLES
TOTAL SCORE: 0
HAVE YOU EVER FELT YOU SHOULD CUT DOWN ON YOUR DRINKING: NO
EVER HAD A DRINK FIRST THING IN THE MORNING TO STEADY YOUR NERVES TO GET RID OF A HANGOVER: NO
EVER FELT BAD OR GUILTY ABOUT YOUR DRINKING: NO
HAVE PEOPLE ANNOYED YOU BY CRITICIZING YOUR DRINKING: NO

## 2025-04-24 ASSESSMENT — PAIN SCALES - GENERAL: PAINLEVEL_OUTOF10: 0 - NO PAIN

## 2025-04-24 ASSESSMENT — PAIN - FUNCTIONAL ASSESSMENT: PAIN_FUNCTIONAL_ASSESSMENT: 0-10

## 2025-04-24 NOTE — ED PROVIDER NOTES
HPI   Chief Complaint   Patient presents with    Chest Pain     Pt presents with a CP/tightness that just does not feel normal so she wanted to get checked out. Pt had an MI approx 18 years. She has some associated SOB but is 98% on RA no S/S of resp distress. Pt AxOx4 GCS 15. Pt also stated she felt a little lightheaded earlier.        HPI:  85-year-old female presents to the emergency department with concern for chest discomfort she has bilateral pressure up under both breast denies it being a pain but does have some mild associated shortness of breath no nausea no vomiting denies any fevers or chills no cough says her symptoms started around 1 AM and when it did not resolve she decided to call 911.  Currently on upon arrival she is actually feeling better says the pressure has eased up she denies any exertional chest discomfort or exertional shortness of breath.    Limitations to history: None  Independent Historians: EMS report  External Records Reviewed: EMR records  ------------------------------------------------------------------------------------------------------------------------------------------  ROS: a ten point review of systems was performed and negative except as per HPI.  ------------------------------------------------------------------------------------------------------------------------------------------  PMH / PSH: as per HPI, reviewed in EMR and discussed with the patient []  MEDS:  reviewed in EMR and discussed with the patient []  ALLERGIES: reviewed in EMR[]  SocH:  as per HPI, otherwise reviewed in EMR []  FH:  as per HPI, otherwise reviewed in EMR []  ------------------------------------------------------------------------------------------------------------------------------------------  Physical Exam:  VS: As documented in the triage note and EMR flowsheet from this visit was reviewed  General: Well appearing. No acute distress.   Eyes:  Extraocular movements grossly intact. No scleral  icterus.   HEENT: Atraumatic. Normocephalic.    Neck: Supple. No gross masses  CV: RRR, audible S1/S2, 2+ symmetric peripheral pulses  Resp: Clear to auscultation bilaterally. No respiratory distress.  Non-labored respirations  GI: Soft, non-tender, non-distended, no rebound or gaurding  MSK: Symmetric muscle bulk. No gross step offs or deformities.  Skin: Warm, dry, no obvious rash.  Neuro: Speech fluent. Awake. Alert. Appropriate conversation.  Psych: Appropriate mood and affect for situation  ------------------------------------------------------------------------------------------------------------------------------------------  Hospital Course / Medical Decision Making:  Independent Interpretations:  EKG -   Twelve-lead EKG performed and independently interpreted by me shows normal sinus rhythm at a ventricular rate of 58 QRS duration of 143 QTc of 411 axis upright and normal ST segments are flat without any elevation with atypical right bundle branch block.  Patient hooked up to the cardiac monitor peripheral IV access obtained labs drawn.    Patient's ED workup was reassuring delta troponin was negative x 2 electrolytes were within normal limits no obvious signs of infection chest x-ray was clear no signs of pneumonia.  Patient remained hemodynamically stable while here in the emergency department she remained asymptomatic and this was without any medication or intervention on my part.  Patient was given reassurance regarding her lab results today and felt comfortable well enough for discharge home agreed to call her primary provider and her cardiologist Dr. Nugent in the morning for close outpatient follow-up    Patient care discussed with: [Admitting team, consultants, social work, THRIVE, SANE, radiology]  Social Determinants affecting care: [Problems affording transportation, inability to afford prescriptions, lack of housing, lack of insurance]    Final diagnosis and disposition: Chest  pain            Caroyln Cleaning MD                          Patient History   Medical History[1]  Surgical History[2]  Family History[3]  Social History[4]    Physical Exam   ED Triage Vitals [04/24/25 0321]   Temperature Heart Rate Respirations BP   36 °C (96.8 °F) 60 20 (!) 146/101      Pulse Ox Temp Source Heart Rate Source Patient Position   98 % Temporal -- --      BP Location FiO2 (%)     -- --       Physical Exam      ED Course & MDM   Diagnoses as of 04/24/25 0622   Chest pain, unspecified type                 No data recorded     [place] Coma Scale Score: 15 (04/24/25 0322 : Broderick Berg, ALHAJI)                           Medical Decision Making      Procedure  Procedures         [1]   Past Medical History:  Diagnosis Date    Disorder of kidney and ureter, unspecified 06/03/2021    Lesion of left native ureter    Diverticulitis of intestine, part unspecified, without perforation or abscess without bleeding     Diverticulitis    Hydronephrosis, left 06/08/2023    Medullary cystic kidney     Kidney, medullary sponge    Old myocardial infarction     History of acute anterior wall MI    Other conditions influencing health status     History of urethral stent    Personal history of other diseases of the respiratory system 06/24/2019    History of chronic obstructive lung disease    Personal history of other infectious and parasitic diseases     History of Clostridium difficile infection    Personal history of other specified conditions 06/11/2018    History of dizziness    Personal history of urinary calculi     History of kidney stones    Unspecified hydronephrosis 05/20/2021    Hydronephrosis, left   [2]   Past Surgical History:  Procedure Laterality Date    LITHOTRIPSY  05/16/2016    Renal Lithotripsy    OTHER SURGICAL HISTORY  06/24/2019    Tooth extraction    OTHER SURGICAL HISTORY  12/02/2019    Tonsillectomy    OTHER SURGICAL HISTORY  12/02/2019    Dilation and curettage    OTHER SURGICAL HISTORY   2019    Hernia repair   [3]   Family History  Problem Relation Name Age of Onset    No Known Problems Father     [4]   Social History  Tobacco Use    Smoking status: Former     Current packs/day: 0.00     Average packs/day: 1 pack/day for 45.0 years (45.0 ttl pk-yrs)     Types: Cigarettes     Start date:      Quit date:      Years since quittin.3     Passive exposure: Never    Smokeless tobacco: Never   Vaping Use    Vaping status: Never Used   Substance Use Topics    Alcohol use: Not Currently    Drug use: Never        Carolyn Cleaning MD  25 0631

## 2025-04-25 LAB
ATRIAL RATE: 58 BPM
P AXIS: 53 DEGREES
PR INTERVAL: 167 MS
Q ONSET: 253 MS
QRS COUNT: 10 BEATS
QRS DURATION: 143 MS
QT INTERVAL: 418 MS
QTC CALCULATION(BAZETT): 411 MS
QTC FREDERICIA: 413 MS
R AXIS: -51 DEGREES
T AXIS: -42 DEGREES
T OFFSET: 462 MS
VENTRICULAR RATE: 58 BPM

## 2025-04-26 PROBLEM — R07.89 ATYPICAL CHEST PAIN: Status: ACTIVE | Noted: 2025-04-26

## 2025-04-26 NOTE — PROGRESS NOTES
Subjective :  Chief Complaint: Tania Guerrero is an 85 y.o. female here for an annual Medicare Wellness visit, annual physical, and follow up labs and chronic conditions.    Was in ER 4/24/25 with complaint of chest pain. ER work up negative. Since discharge from ER      Patient otherwise feels well. No other complaints or concerns.    I have reviewed and reconciled the medication list with the patient today.  Current Medications[1]    The patient's relevant past medical, surgical, family and social history was reviewed in [facility].  All pertinent lab work and results for this visit were reviewed with patient.    Admission on 04/24/2025, Discharged on 04/24/2025   Component Date Value Ref Range Status    POCT pH, Venous 04/24/2025 7.47 (H)  7.33 - 7.43 pH Final    POCT pCO2, Venous 04/24/2025 37 (L)  41 - 51 mm Hg Final    POCT pO2, Venous 04/24/2025 38  35 - 45 mm Hg Final    POCT SO2, Venous 04/24/2025 65  45 - 75 % Final    POCT Oxy Hemoglobin, Venous 04/24/2025 64.1  45.0 - 75.0 % Final    POCT Hematocrit Calculated, Venous 04/24/2025 44.0  36.0 - 46.0 % Final    POCT Sodium, Venous 04/24/2025 135 (L)  136 - 145 mmol/L Final    POCT Potassium, Venous 04/24/2025 3.5  3.5 - 5.3 mmol/L Final    POCT Chloride, Venous 04/24/2025 103  98 - 107 mmol/L Final    POCT Ionized Calicum, Venous 04/24/2025 1.19  1.10 - 1.33 mmol/L Final    POCT Glucose, Venous 04/24/2025 128 (H)  74 - 99 mg/dL Final    POCT Lactate, Venous 04/24/2025 1.2  0.4 - 2.0 mmol/L Final    POCT Base Excess, Venous 04/24/2025 3.3 (H)  -2.0 - 3.0 mmol/L Final    POCT HCO3 Calculated, Venous 04/24/2025 26.9 (H)  22.0 - 26.0 mmol/L Final    POCT Hemoglobin, Venous 04/24/2025 14.6  12.0 - 16.0 g/dL Final    POCT Anion Gap, Venous 04/24/2025 9.0 (L)  10.0 - 25.0 mmol/L Final    Patient Temperature 04/24/2025 37.0  degrees Celsius Final    FiO2 04/24/2025 21  % Final    WBC 04/24/2025 7.2  4.4 - 11.3 x10*3/uL Final    nRBC 04/24/2025 0.0  0.0 - 0.0 /100 WBCs  Final    RBC 04/24/2025 4.52  4.00 - 5.20 x10*6/uL Final    Hemoglobin 04/24/2025 14.1  12.0 - 16.0 g/dL Final    Hematocrit 04/24/2025 42.7  36.0 - 46.0 % Final    MCV 04/24/2025 95  80 - 100 fL Final    MCH 04/24/2025 31.2  26.0 - 34.0 pg Final    MCHC 04/24/2025 33.0  32.0 - 36.0 g/dL Final    RDW 04/24/2025 13.8  11.5 - 14.5 % Final    Platelets 04/24/2025 295  150 - 450 x10*3/uL Final    Neutrophils % 04/24/2025 58.4  40.0 - 80.0 % Final    Immature Granulocytes %, Automated 04/24/2025 0.3  0.0 - 0.9 % Final    Immature Granulocyte Count (IG) includes promyelocytes, myelocytes and metamyelocytes but does not include bands. Percent differential counts (%) should be interpreted in the context of the absolute cell counts (cells/UL).    Lymphocytes % 04/24/2025 28.9  13.0 - 44.0 % Final    Monocytes % 04/24/2025 9.1  2.0 - 10.0 % Final    Eosinophils % 04/24/2025 2.5  0.0 - 6.0 % Final    Basophils % 04/24/2025 0.8  0.0 - 2.0 % Final    Neutrophils Absolute 04/24/2025 4.22  1.60 - 5.50 x10*3/uL Final    Percent differential counts (%) should be interpreted in the context of the absolute cell counts (cells/uL).    Immature Granulocytes Absolute, Au* 04/24/2025 0.02  0.00 - 0.50 x10*3/uL Final    Lymphocytes Absolute 04/24/2025 2.09  0.80 - 3.00 x10*3/uL Final    Monocytes Absolute 04/24/2025 0.66  0.05 - 0.80 x10*3/uL Final    Eosinophils Absolute 04/24/2025 0.18  0.00 - 0.40 x10*3/uL Final    Basophils Absolute 04/24/2025 0.06  0.00 - 0.10 x10*3/uL Final    Glucose 04/24/2025 129 (H)  74 - 99 mg/dL Final    Sodium 04/24/2025 139  136 - 145 mmol/L Final    Potassium 04/24/2025 3.6  3.5 - 5.3 mmol/L Final    Chloride 04/24/2025 104  98 - 107 mmol/L Final    Bicarbonate 04/24/2025 25  21 - 32 mmol/L Final    Anion Gap 04/24/2025 14  10 - 20 mmol/L Final    Urea Nitrogen 04/24/2025 21  6 - 23 mg/dL Final    Creatinine 04/24/2025 1.03  0.50 - 1.05 mg/dL Final    eGFR 04/24/2025 54 (L)  >60 mL/min/1.73m*2 Final     Calculations of estimated GFR are performed using the 2021 CKD-EPI Study Refit equation without the race variable for the IDMS-Traceable creatinine methods.  https://jasn.asnjournals.org/content/early/2021/09/22/ASN.4499886230    Calcium 04/24/2025 9.1  8.6 - 10.3 mg/dL Final    Magnesium 04/24/2025 1.88  1.60 - 2.40 mg/dL Final    BNP 04/24/2025 64  0 - 99 pg/mL Final    Ventricular Rate 04/24/2025 58  BPM Final    Atrial Rate 04/24/2025 58  BPM Final    WV Interval 04/24/2025 167  ms Final    QRS Duration 04/24/2025 143  ms Final    QT Interval 04/24/2025 418  ms Final    QTC Calculation(Bazett) 04/24/2025 411  ms Final    P Axis 04/24/2025 53  degrees Final    R Axis 04/24/2025 -51  degrees Final    T Axis 04/24/2025 -42  degrees Final    QRS Count 04/24/2025 10  beats Final    Q Onset 04/24/2025 253  ms Final    T Offset 04/24/2025 462  ms Final    QTC Fredericia 04/24/2025 413  ms Final    Troponin I, High Sensitivity 04/24/2025 7  0 - 13 ng/L Final    Troponin I, High Sensitivity 04/24/2025 8  0 - 13 ng/L Final   Admission on 03/19/2025, Discharged on 03/19/2025   Component Date Value Ref Range Status    Trichomonas 03/19/2025 None Seen  None Seen Final    Clue Cells 03/19/2025 None Seen  None Seen Final    Yeast 03/19/2025 None Seen  None Seen Final    WBC 03/19/2025 1-2   Final    Color, Urine 03/19/2025 Brown (N)  Light-Yellow, Yellow, Dark-Yellow Final    Appearance, Urine 03/19/2025 Turbid (N)  Clear Final    Specific Gravity, Urine 03/19/2025 1.017  1.005 - 1.035 Final    pH, Urine 03/19/2025 6.5  5.0, 5.5, 6.0, 6.5, 7.0, 7.5, 8.0 Final    Protein, Urine 03/19/2025 30 (1+) (A)  NEGATIVE, 10 (TRACE), 20 (TRACE) mg/dL Final    Glucose, Urine 03/19/2025 Normal  Normal mg/dL Final    Blood, Urine 03/19/2025 OVER (3+) (A)  NEGATIVE mg/dL Final    Ketones, Urine 03/19/2025 NEGATIVE  NEGATIVE mg/dL Final    Bilirubin, Urine 03/19/2025 NEGATIVE  NEGATIVE mg/dL Final    Urobilinogen, Urine 03/19/2025 Normal   Normal mg/dL Final    Nitrite, Urine 03/19/2025 NEGATIVE  NEGATIVE Final    Leukocyte Esterase, Urine 03/19/2025 500 Sara/uL (A)  NEGATIVE Final    Extra Tube 03/19/2025 Hold for add-ons.   Final    Auto resulted.    WBC, Urine 03/19/2025 >50 (A)  1-5, NONE /HPF Final    WBC Clumps, Urine 03/19/2025 FEW  Reference range not established. /HPF Final    RBC, Urine 03/19/2025 >20 (A)  NONE, 1-2, 3-5 /HPF Final    Squamous Epithelial Cells, Urine 03/19/2025 10-25 (FEW)  Reference range not established. /HPF Final    Bacteria, Urine 03/19/2025 2+ (A)  NONE SEEN /HPF Final    Mucus, Urine 03/19/2025 1+  Reference range not established. /LPF Final    Urine Culture 03/19/2025 >=100,000 CFU/mL Klebsiella pneumoniae/variicola (A)   Final         Review of Systems   A complete review of systems was performed and all systems were normal except what is noted in the HPI.      List of current healthcare providers:  Patient Care Team:  Sue Murray MD as PCP - General  Sue Murray MD as PCP - [employer] PCP  Lizeth Lazo, PharmD as Pharmacist (Pharmacy)                      Advance Care Planning:    Living Will: {YES (DEF)/NO:81099}  POA: {YES (DEF)/NO:13406}    Objective :  There were no vitals taken for this visit.   No results found.  Physical Exam    Assessment/Plan :  Problem List Items Addressed This Visit       Hypothyroid    Well controlled continue current medication. Reevaluate in 6 months.            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  GFR stable  Mild microalbuminuria   On ARB  Low salt diet  Recheck 6 months          Diet-controlled type 2 diabetes mellitus    A1c stable at 6.8   Work on diet reviewed with patient.   On statin, ARB  Recheck 6 months          Hyperlipidemia    Well controlled. Continue current medicine and recheck in 6 months.            Hypertension     Well controlled. Continue current medicine and recheck in 6 months.           Hypokalemia    Well controlled. Continue current medicine and recheck in 6 months.          Medicare annual wellness visit, subsequent - Primary    Medical Wellness exam done.  Prevnar 13 3/16  Pneumovax 23 10/14  DEXA 3/24  Former smoker quit 2000  Not on opioids  Depression Screening  Depression screening completed using the PHQ-2 questions, with results documented in the chart/encounter (~5min).  (See Rooming Screening section for documentation, and/or progress note for additional information)          COPD (chronic obstructive pulmonary disease) (Multi)    Well controlled. Continue current medicine and recheck in 6 months.           B12 deficiency    Well controlled. Continue current medicine and recheck in 6 months.            Annual physical exam    Yearly physical done.          Elevated ALT measurement    Minimally elevated  Avoid liver toxins  Continue to monitor   Recheck 6 months          Atypical chest pain    Hospital course reviewed with patient  Work up negative for ACS                 The following health maintenance schedule was reviewed with the patient and provided in printed form in the after visit summary:  Health Maintenance   Topic Date Due    Diabetes: Retinopathy Screening  Never done    Hepatitis A Vaccines (1 of 2 - Risk 2-dose series) Never done    Hepatitis B Vaccines (1 of 3 - Risk 3-dose series) Never done    RSV High Risk: (Elderly (60+) or Pregnant Population) (1 - 1-dose 75+ series) Never done    Zoster Vaccines (2 of 3) 12/13/2018    COVID-19 Vaccine (1 - 2024-25 season) Never done    DTaP/Tdap/Td Vaccines (2 - Td or Tdap) 10/23/2024    Medicare Annual Wellness Visit (AWV)  02/03/2025    Diabetes: Hemoglobin A1C  06/03/2025    Influenza Vaccine (Season Ended) 09/01/2025    TSH Level  03/03/2026    Lipid Panel  03/03/2026    Diabetes: Urine Protein Screening  03/03/2026    Bone Density Scan  03/11/2026     Pneumococcal Vaccine  Completed    HIB Vaccines  Aged Out    IPV Vaccines  Aged Out    Meningococcal Vaccine  Aged Out    Rotavirus Vaccines  Aged Out    HPV Vaccines  Aged Out    Welcome to Medicare Visit  Discontinued           Patient understands and agrees with treatment plan.          Sue Murray MD        [1]   Current Outpatient Medications:     acetaminophen (Tylenol 8 HOUR) 650 mg ER tablet, Take 2 tablets (1,300 mg) by mouth once daily. Do not crush, chew, or split., Disp: , Rfl:     albuterol (Ventolin HFA) 90 mcg/actuation inhaler, Inhale 1 puff every 4 hours if needed for wheezing or shortness of breath., Disp: , Rfl:     amLODIPine (Norvasc) 2.5 mg tablet, TAKE 1 TABLET ONCE DAILY, Disp: 90 tablet, Rfl: 3    ascorbic acid (Vitamin C) 500 mg tablet, Take 1 tablet (500 mg) by mouth once daily., Disp: , Rfl:     aspirin 81 mg EC tablet, Take 1 tablet (81 mg) by mouth once daily., Disp: , Rfl:     cholecalciferol (Vitamin D-3) 50 mcg (2,000 unit) capsule, Take 1 capsule (50 mcg) by mouth 2 times a day., Disp: , Rfl:     Cinnamon 500 mg capsule, Take 1 capsule (500 mg) by mouth once daily., Disp: , Rfl:     clotrimazole-betamethasone (Lotrisone) cream, Use 2x/day for 4 weeks, then daily for 2 weeks, then every other day for 2 weeks, then 2x/week indefinitely, Disp: 45 g, Rfl: 11    cyanocobalamin (Vitamin B-12) 1,000 mcg tablet, Take 1 tablet (1,000 mcg) by mouth once daily., Disp: , Rfl:     fish oil concentrate (Omega-3) 120-180 mg capsule, Take 1 capsule (1 g) by mouth once daily., Disp: , Rfl:     fluticasone-umeclidin-vilanter (TRELEGY-ELLIPTA) 200-62.5-25 mcg blister with device, Inhale 1 puff once daily in the morning. Take before meals., Disp: 180 each, Rfl: 3    GARLIC PO, Take 1 tablet by mouth once daily., Disp: , Rfl:     TEO OIL ORAL, Take 1 capsule by mouth once daily., Disp: , Rfl:     latanoprost (Xalatan) 0.005 % ophthalmic solution, Administer 1 drop into both eyes once daily  at bedtime., Disp: , Rfl:     levothyroxine (Synthroid, Levoxyl) 50 mcg tablet, Take 1 tablet (50 mcg) by mouth once daily., Disp: 90 tablet, Rfl: 3    losartan-hydrochlorothiazide (Hyzaar) 100-25 mg tablet, Take 1 tablet by mouth once daily., Disp: 90 tablet, Rfl: 1    magnesium oxide (Mag-Ox) 250 mg magnesium tablet, Take 1 tablet (250 mg) by mouth once daily., Disp: , Rfl:     metoprolol succinate XL (Toprol-XL) 100 mg 24 hr tablet, Take 1 tablet (100 mg) by mouth once daily., Disp: 90 tablet, Rfl: 3    multivitamin tablet, Take 1 tablet by mouth once daily., Disp: , Rfl:     potassium gluconate 595 mg (99 mg) tablet, Take 1 tablet by mouth 2 times a day., Disp: , Rfl:     simvastatin (Zocor) 10 mg tablet, TAKE ONE-HALF OF A TABLET BY MOUTH ONCE DAILY AT BEDTIME, Disp: 45 tablet, Rfl: 3    TURMERIC ORAL, Take 1 tablet by mouth once daily., Disp: , Rfl:     ubidecarenone (coenzyme Q10) 100 mg tablet, Take 1 tablet by mouth once daily., Disp: , Rfl:      Rfl:     potassium gluconate 595 mg (99 mg) tablet, Take 1 tablet by mouth 2 times a day., Disp: , Rfl:     simvastatin (Zocor) 10 mg tablet, TAKE ONE-HALF OF A TABLET BY MOUTH ONCE DAILY AT BEDTIME, Disp: 45 tablet, Rfl: 3    TURMERIC ORAL, Take 1 tablet by mouth once daily., Disp: , Rfl:     ubidecarenone (coenzyme Q10) 100 mg tablet, Take 1 tablet by mouth once daily., Disp: , Rfl:     levothyroxine (Synthroid, Levoxyl) 50 mcg tablet, Take 1 tablet (50 mcg) by mouth once daily., Disp: 90 tablet, Rfl: 3

## 2025-04-26 NOTE — PATIENT INSTRUCTIONS
I recommend RSV vaccine, Shingrix, new COVID booster, and Boostrix at the pharmacy.    I recommend yearly dilated eye exam with your ophthalmologist or optometrist.    I would like you to follow up in 6 months  Please have all labs that were ordered done at least 1 week prior to your visit.    Recommend healthy diet based around fruits, vegetables, and lean proteins such as chicken, turkey, fish, and beans.  Also include moderate portions of healthy carbohydrates such as wheat bread and pasta, sweet potatoes. Limit sweets and alcoholic beverages. Try not drink more than 100 calories in beverages daily.   It is important to get a protein-rich breakfast daily such as oatmeal, eggs or Greek yogurt.  Increase activity as able to a recommended goal of at least 30 minutes of cardiovascular exercise (walking, swimming, biking, jogging etc.) at least 5 days weekly and a goal of 45 minutes or more most days of the week for weight loss. This exercise can be done all at one time or broken up into 2 or more sessions throughout the day.

## 2025-04-26 NOTE — ASSESSMENT & PLAN NOTE
Had side effects on jardiance  Consider farxiga?  GFR stable  Mild microalbuminuria   On ARB  Low salt diet  Recheck 6 months

## 2025-04-28 ENCOUNTER — APPOINTMENT (OUTPATIENT)
Dept: PRIMARY CARE | Facility: CLINIC | Age: 85
End: 2025-04-28
Payer: MEDICARE

## 2025-04-28 VITALS
HEIGHT: 62 IN | HEART RATE: 67 BPM | TEMPERATURE: 97.5 F | BODY MASS INDEX: 32.39 KG/M2 | WEIGHT: 176 LBS | OXYGEN SATURATION: 97 % | SYSTOLIC BLOOD PRESSURE: 125 MMHG | DIASTOLIC BLOOD PRESSURE: 82 MMHG

## 2025-04-28 DIAGNOSIS — E53.8 B12 DEFICIENCY: ICD-10-CM

## 2025-04-28 DIAGNOSIS — Z00.00 ANNUAL PHYSICAL EXAM: ICD-10-CM

## 2025-04-28 DIAGNOSIS — N18.31 HYPERTENSIVE HEART AND KIDNEY DISEASE WITHOUT HEART FAILURE AND WITH STAGE 3A CHRONIC KIDNEY DISEASE: ICD-10-CM

## 2025-04-28 DIAGNOSIS — E78.2 MIXED HYPERLIPIDEMIA: ICD-10-CM

## 2025-04-28 DIAGNOSIS — I10 PRIMARY HYPERTENSION: ICD-10-CM

## 2025-04-28 DIAGNOSIS — E87.6 HYPOKALEMIA: ICD-10-CM

## 2025-04-28 DIAGNOSIS — Z00.00 MEDICARE ANNUAL WELLNESS VISIT, SUBSEQUENT: Primary | ICD-10-CM

## 2025-04-28 DIAGNOSIS — R07.89 ATYPICAL CHEST PAIN: ICD-10-CM

## 2025-04-28 DIAGNOSIS — R74.01 ELEVATED ALT MEASUREMENT: ICD-10-CM

## 2025-04-28 DIAGNOSIS — E11.9 DIET-CONTROLLED TYPE 2 DIABETES MELLITUS: ICD-10-CM

## 2025-04-28 DIAGNOSIS — I13.10 HYPERTENSIVE HEART AND KIDNEY DISEASE WITHOUT HEART FAILURE AND WITH STAGE 3A CHRONIC KIDNEY DISEASE: ICD-10-CM

## 2025-04-28 DIAGNOSIS — E03.9 HYPOTHYROIDISM, UNSPECIFIED TYPE: ICD-10-CM

## 2025-04-28 DIAGNOSIS — J44.9 CHRONIC OBSTRUCTIVE PULMONARY DISEASE, UNSPECIFIED COPD TYPE (MULTI): ICD-10-CM

## 2025-04-28 PROCEDURE — 99397 PER PM REEVAL EST PAT 65+ YR: CPT | Performed by: FAMILY MEDICINE

## 2025-04-28 PROCEDURE — 1123F ACP DISCUSS/DSCN MKR DOCD: CPT | Performed by: FAMILY MEDICINE

## 2025-04-28 PROCEDURE — 3079F DIAST BP 80-89 MM HG: CPT | Performed by: FAMILY MEDICINE

## 2025-04-28 PROCEDURE — 1036F TOBACCO NON-USER: CPT | Performed by: FAMILY MEDICINE

## 2025-04-28 PROCEDURE — 1158F ADVNC CARE PLAN TLK DOCD: CPT | Performed by: FAMILY MEDICINE

## 2025-04-28 PROCEDURE — 1159F MED LIST DOCD IN RCRD: CPT | Performed by: FAMILY MEDICINE

## 2025-04-28 PROCEDURE — G0439 PPPS, SUBSEQ VISIT: HCPCS | Performed by: FAMILY MEDICINE

## 2025-04-28 PROCEDURE — 1170F FXNL STATUS ASSESSED: CPT | Performed by: FAMILY MEDICINE

## 2025-04-28 PROCEDURE — 1160F RVW MEDS BY RX/DR IN RCRD: CPT | Performed by: FAMILY MEDICINE

## 2025-04-28 PROCEDURE — G0444 DEPRESSION SCREEN ANNUAL: HCPCS | Performed by: FAMILY MEDICINE

## 2025-04-28 PROCEDURE — 3074F SYST BP LT 130 MM HG: CPT | Performed by: FAMILY MEDICINE

## 2025-04-28 PROCEDURE — 99214 OFFICE O/P EST MOD 30 MIN: CPT | Performed by: FAMILY MEDICINE

## 2025-04-28 PROCEDURE — G2211 COMPLEX E/M VISIT ADD ON: HCPCS | Performed by: FAMILY MEDICINE

## 2025-04-28 RX ORDER — LEVOTHYROXINE SODIUM 50 UG/1
50 TABLET ORAL DAILY
Qty: 90 TABLET | Refills: 3 | Status: SHIPPED | OUTPATIENT
Start: 2025-04-28

## 2025-04-28 ASSESSMENT — ACTIVITIES OF DAILY LIVING (ADL)
TAKING_MEDICATION: INDEPENDENT
DRESSING: INDEPENDENT
GROCERY_SHOPPING: INDEPENDENT
DOING_HOUSEWORK: INDEPENDENT
MANAGING_FINANCES: INDEPENDENT
BATHING: INDEPENDENT

## 2025-04-28 ASSESSMENT — ENCOUNTER SYMPTOMS
LOSS OF SENSATION IN FEET: 0
OCCASIONAL FEELINGS OF UNSTEADINESS: 0
DEPRESSION: 0

## 2025-04-28 ASSESSMENT — PATIENT HEALTH QUESTIONNAIRE - PHQ9
1. LITTLE INTEREST OR PLEASURE IN DOING THINGS: NOT AT ALL
2. FEELING DOWN, DEPRESSED OR HOPELESS: NOT AT ALL
SUM OF ALL RESPONSES TO PHQ9 QUESTIONS 1 AND 2: 0

## 2025-05-20 ENCOUNTER — APPOINTMENT (OUTPATIENT)
Facility: HOSPITAL | Age: 85
End: 2025-05-20
Payer: MEDICARE

## 2025-05-22 DIAGNOSIS — I10 PRIMARY HYPERTENSION: ICD-10-CM

## 2025-05-22 DIAGNOSIS — J44.9 CHRONIC OBSTRUCTIVE PULMONARY DISEASE, UNSPECIFIED COPD TYPE (MULTI): ICD-10-CM

## 2025-05-22 DIAGNOSIS — I25.10 ASHD (ARTERIOSCLEROTIC HEART DISEASE): ICD-10-CM

## 2025-05-22 DIAGNOSIS — E66.9 OBESITY (BMI 30-39.9): ICD-10-CM

## 2025-05-22 DIAGNOSIS — E78.2 MIXED HYPERLIPIDEMIA: ICD-10-CM

## 2025-06-05 RX ORDER — LOSARTAN POTASSIUM AND HYDROCHLOROTHIAZIDE 25; 100 MG/1; MG/1
1 TABLET ORAL DAILY
Qty: 90 TABLET | Refills: 3 | Status: SHIPPED | OUTPATIENT
Start: 2025-06-05

## 2025-07-08 PROCEDURE — RXMED WILLOW AMBULATORY MEDICATION CHARGE

## 2025-07-10 ENCOUNTER — PHARMACY VISIT (OUTPATIENT)
Dept: PHARMACY | Facility: CLINIC | Age: 85
End: 2025-07-10
Payer: MEDICARE

## 2025-08-10 DIAGNOSIS — I10 PRIMARY HYPERTENSION: ICD-10-CM

## 2025-08-12 RX ORDER — AMLODIPINE BESYLATE 2.5 MG/1
2.5 TABLET ORAL DAILY
Qty: 90 TABLET | Refills: 3 | Status: SHIPPED | OUTPATIENT
Start: 2025-08-12

## 2025-08-28 ENCOUNTER — HOSPITAL ENCOUNTER (OUTPATIENT)
Dept: RADIOLOGY | Facility: HOSPITAL | Age: 85
Discharge: HOME | End: 2025-08-28
Payer: MEDICARE

## 2025-08-28 DIAGNOSIS — M79.89 OTHER SPECIFIED SOFT TISSUE DISORDERS: ICD-10-CM

## 2025-08-28 PROCEDURE — 93971 EXTREMITY STUDY: CPT

## 2025-09-05 ENCOUNTER — TELEMEDICINE (OUTPATIENT)
Dept: PHARMACY | Facility: HOSPITAL | Age: 85
End: 2025-09-05
Payer: MEDICARE

## 2025-09-05 DIAGNOSIS — I13.10 HYPERTENSIVE HEART AND KIDNEY DISEASE WITHOUT HEART FAILURE AND WITH STAGE 3A CHRONIC KIDNEY DISEASE: ICD-10-CM

## 2025-09-05 DIAGNOSIS — I10 PRIMARY HYPERTENSION: ICD-10-CM

## 2025-09-05 DIAGNOSIS — N18.31 HYPERTENSIVE HEART AND KIDNEY DISEASE WITHOUT HEART FAILURE AND WITH STAGE 3A CHRONIC KIDNEY DISEASE: ICD-10-CM

## 2025-09-05 DIAGNOSIS — E11.9 DIET-CONTROLLED TYPE 2 DIABETES MELLITUS: ICD-10-CM

## 2025-09-05 DIAGNOSIS — J44.9 CHRONIC OBSTRUCTIVE PULMONARY DISEASE, UNSPECIFIED COPD TYPE (MULTI): ICD-10-CM

## 2025-11-03 ENCOUNTER — APPOINTMENT (OUTPATIENT)
Dept: PRIMARY CARE | Facility: CLINIC | Age: 85
End: 2025-11-03
Payer: MEDICARE

## (undated) DEVICE — CLIP, ENDO APPLIER LIGAMAX 5MM

## (undated) DEVICE — TROCAR, KII OPTICAL BLADELESS 5MM Z THREAD 100MM LNGTH